# Patient Record
Sex: MALE | Race: WHITE | NOT HISPANIC OR LATINO | Employment: UNEMPLOYED | URBAN - METROPOLITAN AREA
[De-identification: names, ages, dates, MRNs, and addresses within clinical notes are randomized per-mention and may not be internally consistent; named-entity substitution may affect disease eponyms.]

---

## 2018-12-20 ENCOUNTER — HOSPITAL ENCOUNTER (INPATIENT)
Facility: MEDICAL CENTER | Age: 58
LOS: 11 days | DRG: 286 | End: 2018-12-31
Attending: EMERGENCY MEDICINE | Admitting: HOSPITALIST
Payer: MEDICAID

## 2018-12-20 ENCOUNTER — APPOINTMENT (OUTPATIENT)
Dept: RADIOLOGY | Facility: MEDICAL CENTER | Age: 58
DRG: 286 | End: 2018-12-20
Attending: EMERGENCY MEDICINE
Payer: MEDICAID

## 2018-12-20 DIAGNOSIS — I50.9 ACUTE CONGESTIVE HEART FAILURE, UNSPECIFIED HEART FAILURE TYPE (HCC): ICD-10-CM

## 2018-12-20 LAB
ALBUMIN SERPL BCP-MCNC: 3.6 G/DL (ref 3.2–4.9)
ALBUMIN/GLOB SERPL: 1.2 G/DL
ALP SERPL-CCNC: 81 U/L (ref 30–99)
ALT SERPL-CCNC: 21 U/L (ref 2–50)
ANION GAP SERPL CALC-SCNC: 6 MMOL/L (ref 0–11.9)
AST SERPL-CCNC: 26 U/L (ref 12–45)
BASOPHILS # BLD AUTO: 0.2 % (ref 0–1.8)
BASOPHILS # BLD: 0.03 K/UL (ref 0–0.12)
BILIRUB SERPL-MCNC: 0.8 MG/DL (ref 0.1–1.5)
BNP SERPL-MCNC: 1146 PG/ML (ref 0–100)
BUN SERPL-MCNC: 22 MG/DL (ref 8–22)
CALCIUM SERPL-MCNC: 9 MG/DL (ref 8.5–10.5)
CHLORIDE SERPL-SCNC: 103 MMOL/L (ref 96–112)
CO2 SERPL-SCNC: 30 MMOL/L (ref 20–33)
CREAT SERPL-MCNC: 1.02 MG/DL (ref 0.5–1.4)
EKG IMPRESSION: NORMAL
EOSINOPHIL # BLD AUTO: 0.01 K/UL (ref 0–0.51)
EOSINOPHIL NFR BLD: 0.1 % (ref 0–6.9)
ERYTHROCYTE [DISTWIDTH] IN BLOOD BY AUTOMATED COUNT: 40.5 FL (ref 35.9–50)
EST. AVERAGE GLUCOSE BLD GHB EST-MCNC: 117 MG/DL
GLOBULIN SER CALC-MCNC: 3 G/DL (ref 1.9–3.5)
GLUCOSE SERPL-MCNC: 100 MG/DL (ref 65–99)
HBA1C MFR BLD: 5.7 % (ref 0–5.6)
HCT VFR BLD AUTO: 47 % (ref 42–52)
HGB BLD-MCNC: 16.4 G/DL (ref 14–18)
IMM GRANULOCYTES # BLD AUTO: 0.06 K/UL (ref 0–0.11)
IMM GRANULOCYTES NFR BLD AUTO: 0.4 % (ref 0–0.9)
LACTATE BLD-SCNC: 1.4 MMOL/L (ref 0.5–2)
LYMPHOCYTES # BLD AUTO: 1.23 K/UL (ref 1–4.8)
LYMPHOCYTES NFR BLD: 9 % (ref 22–41)
MAGNESIUM SERPL-MCNC: 1.9 MG/DL (ref 1.5–2.5)
MCH RBC QN AUTO: 30.7 PG (ref 27–33)
MCHC RBC AUTO-ENTMCNC: 34.9 G/DL (ref 33.7–35.3)
MCV RBC AUTO: 88 FL (ref 81.4–97.8)
MONOCYTES # BLD AUTO: 0.63 K/UL (ref 0–0.85)
MONOCYTES NFR BLD AUTO: 4.6 % (ref 0–13.4)
NEUTROPHILS # BLD AUTO: 11.69 K/UL (ref 1.82–7.42)
NEUTROPHILS NFR BLD: 85.7 % (ref 44–72)
NRBC # BLD AUTO: 0 K/UL
NRBC BLD-RTO: 0 /100 WBC
PLATELET # BLD AUTO: 330 K/UL (ref 164–446)
PMV BLD AUTO: 9.2 FL (ref 9–12.9)
POTASSIUM SERPL-SCNC: 4.1 MMOL/L (ref 3.6–5.5)
PROT SERPL-MCNC: 6.6 G/DL (ref 6–8.2)
RBC # BLD AUTO: 5.34 M/UL (ref 4.7–6.1)
SODIUM SERPL-SCNC: 139 MMOL/L (ref 135–145)
TROPONIN I SERPL-MCNC: 0.15 NG/ML (ref 0–0.04)
TSH SERPL DL<=0.005 MIU/L-ACNC: 1.36 UIU/ML (ref 0.38–5.33)
WBC # BLD AUTO: 13.7 K/UL (ref 4.8–10.8)

## 2018-12-20 PROCEDURE — 36415 COLL VENOUS BLD VENIPUNCTURE: CPT

## 2018-12-20 PROCEDURE — 304561 HCHG STAT O2

## 2018-12-20 PROCEDURE — 83036 HEMOGLOBIN GLYCOSYLATED A1C: CPT

## 2018-12-20 PROCEDURE — 96367 TX/PROPH/DG ADDL SEQ IV INF: CPT

## 2018-12-20 PROCEDURE — 700105 HCHG RX REV CODE 258: Performed by: EMERGENCY MEDICINE

## 2018-12-20 PROCEDURE — A9270 NON-COVERED ITEM OR SERVICE: HCPCS | Performed by: HOSPITALIST

## 2018-12-20 PROCEDURE — 80053 COMPREHEN METABOLIC PANEL: CPT

## 2018-12-20 PROCEDURE — 700102 HCHG RX REV CODE 250 W/ 637 OVERRIDE(OP): Performed by: EMERGENCY MEDICINE

## 2018-12-20 PROCEDURE — 87040 BLOOD CULTURE FOR BACTERIA: CPT

## 2018-12-20 PROCEDURE — 83735 ASSAY OF MAGNESIUM: CPT

## 2018-12-20 PROCEDURE — 84484 ASSAY OF TROPONIN QUANT: CPT

## 2018-12-20 PROCEDURE — 71045 X-RAY EXAM CHEST 1 VIEW: CPT

## 2018-12-20 PROCEDURE — 83605 ASSAY OF LACTIC ACID: CPT

## 2018-12-20 PROCEDURE — 99285 EMERGENCY DEPT VISIT HI MDM: CPT

## 2018-12-20 PROCEDURE — A9270 NON-COVERED ITEM OR SERVICE: HCPCS | Performed by: EMERGENCY MEDICINE

## 2018-12-20 PROCEDURE — 770020 HCHG ROOM/CARE - TELE (206)

## 2018-12-20 PROCEDURE — 96375 TX/PRO/DX INJ NEW DRUG ADDON: CPT

## 2018-12-20 PROCEDURE — 85025 COMPLETE CBC W/AUTO DIFF WBC: CPT

## 2018-12-20 PROCEDURE — 96372 THER/PROPH/DIAG INJ SC/IM: CPT

## 2018-12-20 PROCEDURE — 700111 HCHG RX REV CODE 636 W/ 250 OVERRIDE (IP): Performed by: EMERGENCY MEDICINE

## 2018-12-20 PROCEDURE — 96365 THER/PROPH/DIAG IV INF INIT: CPT

## 2018-12-20 PROCEDURE — 96376 TX/PRO/DX INJ SAME DRUG ADON: CPT

## 2018-12-20 PROCEDURE — 700102 HCHG RX REV CODE 250 W/ 637 OVERRIDE(OP): Performed by: HOSPITALIST

## 2018-12-20 PROCEDURE — 93005 ELECTROCARDIOGRAM TRACING: CPT | Performed by: EMERGENCY MEDICINE

## 2018-12-20 PROCEDURE — 83880 ASSAY OF NATRIURETIC PEPTIDE: CPT

## 2018-12-20 PROCEDURE — 84443 ASSAY THYROID STIM HORMONE: CPT

## 2018-12-20 PROCEDURE — 700111 HCHG RX REV CODE 636 W/ 250 OVERRIDE (IP): Performed by: HOSPITALIST

## 2018-12-20 RX ORDER — IBUPROFEN 200 MG
1000 TABLET ORAL EVERY 8 HOURS PRN
Status: ON HOLD | COMMUNITY
End: 2018-12-31

## 2018-12-20 RX ORDER — FUROSEMIDE 10 MG/ML
20 INJECTION INTRAMUSCULAR; INTRAVENOUS
Status: DISCONTINUED | OUTPATIENT
Start: 2018-12-20 | End: 2018-12-21

## 2018-12-20 RX ORDER — ACETAMINOPHEN 325 MG/1
650 TABLET ORAL EVERY 6 HOURS PRN
Status: DISCONTINUED | OUTPATIENT
Start: 2018-12-20 | End: 2018-12-31 | Stop reason: HOSPADM

## 2018-12-20 RX ORDER — FUROSEMIDE 10 MG/ML
40 INJECTION INTRAMUSCULAR; INTRAVENOUS ONCE
Status: COMPLETED | OUTPATIENT
Start: 2018-12-20 | End: 2018-12-20

## 2018-12-20 RX ORDER — PROMETHAZINE HYDROCHLORIDE 25 MG/1
12.5-25 SUPPOSITORY RECTAL EVERY 4 HOURS PRN
Status: DISCONTINUED | OUTPATIENT
Start: 2018-12-20 | End: 2018-12-31 | Stop reason: HOSPADM

## 2018-12-20 RX ORDER — NITROGLYCERIN 0.4 MG/1
0.4 TABLET SUBLINGUAL ONCE
Status: COMPLETED | OUTPATIENT
Start: 2018-12-20 | End: 2018-12-20

## 2018-12-20 RX ORDER — AZITHROMYCIN 500 MG/1
500 INJECTION, POWDER, LYOPHILIZED, FOR SOLUTION INTRAVENOUS ONCE
Status: COMPLETED | OUTPATIENT
Start: 2018-12-20 | End: 2018-12-20

## 2018-12-20 RX ORDER — LISINOPRIL 5 MG/1
10 TABLET ORAL DAILY
Status: DISCONTINUED | OUTPATIENT
Start: 2018-12-20 | End: 2018-12-21

## 2018-12-20 RX ORDER — BISACODYL 10 MG
10 SUPPOSITORY, RECTAL RECTAL
Status: DISCONTINUED | OUTPATIENT
Start: 2018-12-20 | End: 2018-12-31 | Stop reason: HOSPADM

## 2018-12-20 RX ORDER — ONDANSETRON 2 MG/ML
4 INJECTION INTRAMUSCULAR; INTRAVENOUS EVERY 4 HOURS PRN
Status: DISCONTINUED | OUTPATIENT
Start: 2018-12-20 | End: 2018-12-31 | Stop reason: HOSPADM

## 2018-12-20 RX ORDER — PROMETHAZINE HYDROCHLORIDE 25 MG/1
12.5-25 TABLET ORAL EVERY 4 HOURS PRN
Status: DISCONTINUED | OUTPATIENT
Start: 2018-12-20 | End: 2018-12-31 | Stop reason: HOSPADM

## 2018-12-20 RX ORDER — AMOXICILLIN 250 MG
2 CAPSULE ORAL 2 TIMES DAILY
Status: DISCONTINUED | OUTPATIENT
Start: 2018-12-20 | End: 2018-12-31 | Stop reason: HOSPADM

## 2018-12-20 RX ORDER — POLYETHYLENE GLYCOL 3350 17 G/17G
1 POWDER, FOR SOLUTION ORAL
Status: DISCONTINUED | OUTPATIENT
Start: 2018-12-20 | End: 2018-12-31 | Stop reason: HOSPADM

## 2018-12-20 RX ORDER — ONDANSETRON 4 MG/1
4 TABLET, ORALLY DISINTEGRATING ORAL EVERY 4 HOURS PRN
Status: DISCONTINUED | OUTPATIENT
Start: 2018-12-20 | End: 2018-12-31 | Stop reason: HOSPADM

## 2018-12-20 RX ADMIN — AZITHROMYCIN MONOHYDRATE 500 MG: 500 INJECTION, POWDER, LYOPHILIZED, FOR SOLUTION INTRAVENOUS at 12:05

## 2018-12-20 RX ADMIN — NITROGLYCERIN 0.4 MG: 0.4 TABLET SUBLINGUAL at 11:59

## 2018-12-20 RX ADMIN — ENOXAPARIN SODIUM 40 MG: 100 INJECTION SUBCUTANEOUS at 16:57

## 2018-12-20 RX ADMIN — LISINOPRIL 10 MG: 10 TABLET ORAL at 16:58

## 2018-12-20 RX ADMIN — NITROGLYCERIN 1 INCH: 20 OINTMENT TOPICAL at 12:00

## 2018-12-20 RX ADMIN — FUROSEMIDE 20 MG: 10 INJECTION, SOLUTION INTRAMUSCULAR; INTRAVENOUS at 16:57

## 2018-12-20 RX ADMIN — SODIUM CHLORIDE 3 G: 900 INJECTION INTRAVENOUS at 11:15

## 2018-12-20 RX ADMIN — FUROSEMIDE 40 MG: 10 INJECTION, SOLUTION INTRAMUSCULAR; INTRAVENOUS at 12:14

## 2018-12-20 ASSESSMENT — COGNITIVE AND FUNCTIONAL STATUS - GENERAL
SUGGESTED CMS G CODE MODIFIER MOBILITY: CJ
DRESSING REGULAR LOWER BODY CLOTHING: A LITTLE
CLIMB 3 TO 5 STEPS WITH RAILING: A LITTLE
WALKING IN HOSPITAL ROOM: A LITTLE
STANDING UP FROM CHAIR USING ARMS: A LITTLE
DAILY ACTIVITIY SCORE: 23
MOBILITY SCORE: 21
SUGGESTED CMS G CODE MODIFIER DAILY ACTIVITY: CI

## 2018-12-20 ASSESSMENT — COPD QUESTIONNAIRES
DURING THE PAST 4 WEEKS HOW MUCH DID YOU FEEL SHORT OF BREATH: SOME OF THE TIME
HAVE YOU SMOKED AT LEAST 100 CIGARETTES IN YOUR ENTIRE LIFE: YES
COPD SCREENING SCORE: 5
HAVE YOU SMOKED AT LEAST 100 CIGARETTES IN YOUR ENTIRE LIFE: YES
DO YOU EVER COUGH UP ANY MUCUS OR PHLEGM?: YES, A FEW DAYS A WEEK OR MONTH
COPD SCREENING SCORE: 6
DURING THE PAST 4 WEEKS HOW MUCH DID YOU FEEL SHORT OF BREATH: SOME OF THE TIME
DO YOU EVER COUGH UP ANY MUCUS OR PHLEGM?: YES, A FEW DAYS A WEEK OR MONTH
IN THE PAST 12 MONTHS DO YOU DO LESS THAN YOU USED TO BECAUSE OF YOUR BREATHING PROBLEMS: DISAGREE/UNSURE

## 2018-12-20 ASSESSMENT — LIFESTYLE VARIABLES
EVER_SMOKED: YES
DO YOU DRINK ALCOHOL: NO
ALCOHOL_USE: NO
EVER_SMOKED: YES

## 2018-12-20 ASSESSMENT — PATIENT HEALTH QUESTIONNAIRE - PHQ9
1. LITTLE INTEREST OR PLEASURE IN DOING THINGS: NOT AT ALL
2. FEELING DOWN, DEPRESSED, IRRITABLE, OR HOPELESS: NOT AT ALL
SUM OF ALL RESPONSES TO PHQ9 QUESTIONS 1 AND 2: 0

## 2018-12-20 ASSESSMENT — PAIN SCALES - GENERAL
PAINLEVEL_OUTOF10: 0
PAINLEVEL_OUTOF10: 0

## 2018-12-20 NOTE — ED TRIAGE NOTES
Chief Complaint   Patient presents with   • Shortness of Breath     x 1 week progressively getting worse. arrived in triage hypoxic on ra w/sat of 60%. placed on 6l/nc still hypoxic 88%. pt able to speak in full sentences.   • Cough     productive cough x 1 week. states coughing up greenish sputum     Charge nurse notified.

## 2018-12-20 NOTE — ED PROVIDER NOTES
"ED Provider Note    CHIEF COMPLAINT  Chief Complaint   Patient presents with   • Shortness of Breath     x 1 week progressively getting worse. arrived in triage hypoxic on ra w/sat of 60%. placed on 6l/nc still hypoxic 88%. pt able to speak in full sentences.   • Cough     productive cough x 1 week. states coughing up greenish sputum       HPI  Alexis Georges is a 58 y.o. male who presents shortness of breath.  Patient started getting short of breath about 2 weeks ago.  Had congestion and runny nose.  Developed cough about a week ago.  Coughing up green mucus.  Has noticed leg swelling on both legs over the last week as well.  He has not had a fever or chills.  He states that his shortness of breath is constant, but worse with any activity.  He denies orthopnea or PND.  Denies pleuritic chest pain.    REVIEW OF SYSTEMS  As per HPI, otherwise a 10 point review of systems is negative    PAST MEDICAL HISTORY  Denies medical problems.  Denies history of COPD, emphysema, congestive heart failure, coronary disease etc.    SOCIAL HISTORY  Social History   Substance Use Topics   • Smoking status: Current Some Day Smoker     Packs/day: 0.10   • Smokeless tobacco: Never Used   • Alcohol use Not on file   Denies illicit drug use    SURGICAL HISTORY  History reviewed. No pertinent surgical history.    CURRENT MEDICATIONS  Home Medications    **Home medications have not yet been reviewed for this encounter**         ALLERGIES  No Known Allergies    PHYSICAL EXAM  VITAL SIGNS: /106   Pulse 61   Temp 36.9 °C (98.4 °F) (Temporal)   Resp (!) 40   Ht 1.93 m (6' 4\")   Wt 86.2 kg (190 lb)   SpO2 100%   BMI 23.13 kg/m²    Constitutional: Awake and alert, dyspneic, ill-appearing  HENT:  Atraumatic, Normocephalic.Oropharynx dry mucus membranes, Nose normal inspection.   Eyes: Normal inspection  Neck: Supple, positive JVD  Cardiovascular: Normal heart rate, Normal rhythm.  Symmetric peripheral pulses.   Thorax & Lungs: " Decreased breath sounds on the right particularly right base versus the left.  Good aeration otherwise.  Abdomen: Bowel sounds normal, soft, non-distended, nontender, no mass  Skin: Warm, Dry, No rash.   Extremities: Pedal edema bilaterally.  Neurologic: Grossly normal   Psychiatric: Anxious appearing    RADIOLOGY/PROCEDURES  DX-CHEST-PORTABLE (1 VIEW)   Final Result         1. Cardiomegaly and bibasilar interstitial/alveolar opacities, right worse than left. They likely represent consolidations and/or edema. Multifocal pneumonia can be considered in the appropriate clinical settings.   2. Small right pleural effusion.      EC-ECHOCARDIOGRAM COMPLETE W/O CONT    (Results Pending)        Imaging is interpreted by radiologist    Labs:  Results for orders placed or performed during the hospital encounter of 12/20/18   LACTIC ACID   Result Value Ref Range    Lactic Acid 1.4 0.5 - 2.0 mmol/L   CBC WITH DIFFERENTIAL   Result Value Ref Range    WBC 13.7 (H) 4.8 - 10.8 K/uL    RBC 5.34 4.70 - 6.10 M/uL    Hemoglobin 16.4 14.0 - 18.0 g/dL    Hematocrit 47.0 42.0 - 52.0 %    MCV 88.0 81.4 - 97.8 fL    MCH 30.7 27.0 - 33.0 pg    MCHC 34.9 33.7 - 35.3 g/dL    RDW 40.5 35.9 - 50.0 fL    Platelet Count 330 164 - 446 K/uL    MPV 9.2 9.0 - 12.9 fL    Neutrophils-Polys 85.70 (H) 44.00 - 72.00 %    Lymphocytes 9.00 (L) 22.00 - 41.00 %    Monocytes 4.60 0.00 - 13.40 %    Eosinophils 0.10 0.00 - 6.90 %    Basophils 0.20 0.00 - 1.80 %    Immature Granulocytes 0.40 0.00 - 0.90 %    Nucleated RBC 0.00 /100 WBC    Neutrophils (Absolute) 11.69 (H) 1.82 - 7.42 K/uL    Lymphs (Absolute) 1.23 1.00 - 4.80 K/uL    Monos (Absolute) 0.63 0.00 - 0.85 K/uL    Eos (Absolute) 0.01 0.00 - 0.51 K/uL    Baso (Absolute) 0.03 0.00 - 0.12 K/uL    Immature Granulocytes (abs) 0.06 0.00 - 0.11 K/uL    NRBC (Absolute) 0.00 K/uL   COMP METABOLIC PANEL   Result Value Ref Range    Sodium 139 135 - 145 mmol/L    Potassium 4.1 3.6 - 5.5 mmol/L    Chloride 103 96 -  112 mmol/L    Co2 30 20 - 33 mmol/L    Anion Gap 6.0 0.0 - 11.9    Glucose 100 (H) 65 - 99 mg/dL    Bun 22 8 - 22 mg/dL    Creatinine 1.02 0.50 - 1.40 mg/dL    Calcium 9.0 8.5 - 10.5 mg/dL    AST(SGOT) 26 12 - 45 U/L    ALT(SGPT) 21 2 - 50 U/L    Alkaline Phosphatase 81 30 - 99 U/L    Total Bilirubin 0.8 0.1 - 1.5 mg/dL    Albumin 3.6 3.2 - 4.9 g/dL    Total Protein 6.6 6.0 - 8.2 g/dL    Globulin 3.0 1.9 - 3.5 g/dL    A-G Ratio 1.2 g/dL   BLOOD CULTURE   Result Value Ref Range    Significant Indicator NEG     Source BLD     Site PERIPHERAL     Blood Culture       No Growth    Note: Blood cultures are incubated for 5 days and  are monitored continuously.Positive blood cultures  are called to the RN and reported as soon as  they are identified.     BLOOD CULTURE   Result Value Ref Range    Significant Indicator NEG     Source BLD     Site PERIPHERAL     Blood Culture       No Growth    Note: Blood cultures are incubated for 5 days and  are monitored continuously.Positive blood cultures  are called to the RN and reported as soon as  they are identified.     TROPONIN   Result Value Ref Range    Troponin I 0.15 (H) 0.00 - 0.04 ng/mL   BTYPE NATRIURETIC PEPTIDE   Result Value Ref Range    B Natriuretic Peptide 1146 (H) 0 - 100 pg/mL   ESTIMATED GFR   Result Value Ref Range    GFR If African American >60 >60 mL/min/1.73 m 2    GFR If Non African American >60 >60 mL/min/1.73 m 2   TSH (Thyroid Stimulating Hormone)   Result Value Ref Range    TSH 1.360 0.380 - 5.330 uIU/mL   Hemoglobin A1c   Result Value Ref Range    Glycohemoglobin 5.7 (H) 0.0 - 5.6 %    Est Avg Glucose 117 mg/dL   Magnesium   Result Value Ref Range    Magnesium 1.9 1.5 - 2.5 mg/dL   Basic Metabolic Panel (BMP)   Result Value Ref Range    Sodium 139 135 - 145 mmol/L    Potassium 4.9 3.6 - 5.5 mmol/L    Chloride 104 96 - 112 mmol/L    Co2 29 20 - 33 mmol/L    Glucose 118 (H) 65 - 99 mg/dL    Bun 29 (H) 8 - 22 mg/dL    Creatinine 1.06 0.50 - 1.40 mg/dL     Calcium 8.3 (L) 8.5 - 10.5 mg/dL    Anion Gap 6.0 0.0 - 11.9   Lipid Profile (Lipid Panel) Fasting   Result Value Ref Range    Cholesterol,Tot 109 100 - 199 mg/dL    Triglycerides 46 0 - 149 mg/dL    HDL 27 (A) >=40 mg/dL    LDL 73 <100 mg/dL   Magnesium: Tomorrow AM   Result Value Ref Range    Magnesium 1.8 1.5 - 2.5 mg/dL   ABG - LAB   Result Value Ref Range    Ph 7.29 (LL) 7.40 - 7.50    Pco2 63.2 (HH) 26.0 - 37.0 mmHg    Po2 77.5 64.0 - 87.0 mmHg    O2 Saturation 93.1 93.0 - 99.0 %    Hco3 30 (H) 17 - 25 mmol/L    Base Excess 1 -4 - 3 mmol/L    Body Temp see below Centigrade   ESTIMATED GFR   Result Value Ref Range    GFR If African American >60 >60 mL/min/1.73 m 2    GFR If Non African American >60 >60 mL/min/1.73 m 2   ABG - LAB   Result Value Ref Range    Ph 7.28 (LL) 7.40 - 7.50    Pco2 68.3 (HH) 26.0 - 37.0 mmHg    Po2 70.7 64.0 - 87.0 mmHg    O2 Saturation 91.6 (L) 93.0 - 99.0 %    Hco3 31 (H) 17 - 25 mmol/L    Base Excess 2 -4 - 3 mmol/L    Body Temp see below Centigrade    O2 Therapy 5.0 2.0 - 10.0 L/min   ABG - LAB   Result Value Ref Range    Ph 7.34 (L) 7.40 - 7.50    Pco2 59.1 (HH) 26.0 - 37.0 mmHg    Po2 69.0 64.0 - 87.0 mmHg    O2 Saturation 92.8 (L) 93.0 - 99.0 %    Hco3 31 (H) 17 - 25 mmol/L    Base Excess 4 (H) -4 - 3 mmol/L    Body Temp 36.7 Centigrade    O2 Therapy 5.0 2.0 - 10.0 L/min    Ph -TC 7.35 (L) 7.40 - 7.50    Pco2 -TC 58.3 (HH) 26.0 - 37.0 mmHg    Po2 -TC 67.6 64.0 - 87.0 mmHg   EKG (NOW)   Result Value Ref Range    Report       [facility] Emergency Dept.    Test Date:  2018  Pt Name:    DINA CROUCH              Department: ER  MRN:        [id number]                      Room:        09  Gender:     Male                         Technician: [id number]  :        1960                   Requested By:ER TRIAGE PROTOCOL  Order #:    [id number]                    Reading MD: SANTANA ALBERT MD    Measurements  Intervals                                 Axis  Rate:       108                          P:          43  CA:         148                          QRS:        117  QRSD:       82                           T:          -52  QT:         340  QTc:        456    Interpretive Statements  SINUS TACHYCARDIA  PROBABLE LEFT ATRIAL ABNORMALITY  LEFT POSTERIOR FASCICULAR BLOCK  Nonspecific ST-T wave changes    No previous ECG available for comparison    Electronically Signed On 12- 11:28:48 PST by SANTANA ALBERT MD           COURSE & MEDICAL DECISION MAKING  Patient presents with cough and shortness of breath.  He appears volume overloaded on his examination although described production with his cough.  He met some septic parameters and was given empiric antibiotic on arrival.  Chest x-ray demonstrates infiltrate more on the right than the left.  However data returned more consistent with congestive heart failure and volume overload.  He was given aspirin and 40 mg's of Lasix.  He will need to be admitted to the hospital for further workup and treatment.  I consulted Dr. López for admission.      FINAL IMPRESSION  1.  Acute congestive heart failure with volume overload  2.  Rule out pneumonia/superimposed infection    This dictation was created using voice recognition software. The accuracy of the dictation is limited to the abilities of the software.  The nursing notes were reviewed and certain aspects of this information were incorporated into this note.      Electronically signed by: Santana Albert, 12/20/2018 10:47 AM

## 2018-12-20 NOTE — ED TRIAGE NOTES
".  Chief Complaint   Patient presents with   • Shortness of Breath     x 1 week progressively getting worse. arrived in triage hypoxic on ra w/sat of 60%. placed on 6l/nc still hypoxic 88%. pt able to speak in full sentences.   • Cough     productive cough x 1 week. states coughing up greenish sputum   Agree with triage assessment.  Pt reports gradual onset approximately 3 weeks ago, difficulty breathing 1 week ago.  No chest pain.  No fever/chills.  Pt states \" feels like the pneumonia I had before. \"  + BLE edema noted(onset 3 weeks ago).     "

## 2018-12-21 ENCOUNTER — APPOINTMENT (OUTPATIENT)
Dept: CARDIOLOGY | Facility: MEDICAL CENTER | Age: 58
DRG: 286 | End: 2018-12-21
Attending: HOSPITALIST
Payer: MEDICAID

## 2018-12-21 ENCOUNTER — APPOINTMENT (OUTPATIENT)
Dept: RADIOLOGY | Facility: MEDICAL CENTER | Age: 58
DRG: 286 | End: 2018-12-21
Attending: INTERNAL MEDICINE
Payer: MEDICAID

## 2018-12-21 PROBLEM — I50.9 CHF (CONGESTIVE HEART FAILURE) (HCC): Status: ACTIVE | Noted: 2018-12-21

## 2018-12-21 PROBLEM — R06.89 HYPERCAPNIA: Status: ACTIVE | Noted: 2018-12-21

## 2018-12-21 LAB
AMPHET UR QL SCN: NEGATIVE
ANION GAP SERPL CALC-SCNC: 6 MMOL/L (ref 0–11.9)
BARBITURATES UR QL SCN: NEGATIVE
BASE EXCESS BLDA CALC-SCNC: 1 MMOL/L (ref -4–3)
BASE EXCESS BLDA CALC-SCNC: 2 MMOL/L (ref -4–3)
BASE EXCESS BLDA CALC-SCNC: 2 MMOL/L (ref -4–3)
BASE EXCESS BLDA CALC-SCNC: 4 MMOL/L (ref -4–3)
BENZODIAZ UR QL SCN: NEGATIVE
BODY TEMPERATURE: 36.7 CENTIGRADE
BODY TEMPERATURE: ABNORMAL CENTIGRADE
BUN SERPL-MCNC: 29 MG/DL (ref 8–22)
BZE UR QL SCN: NEGATIVE
CALCIUM SERPL-MCNC: 8.3 MG/DL (ref 8.5–10.5)
CANNABINOIDS UR QL SCN: NEGATIVE
CHLORIDE SERPL-SCNC: 104 MMOL/L (ref 96–112)
CHOLEST SERPL-MCNC: 109 MG/DL (ref 100–199)
CK SERPL-CCNC: 52 U/L (ref 0–154)
CO2 SERPL-SCNC: 29 MMOL/L (ref 20–33)
CREAT SERPL-MCNC: 1.06 MG/DL (ref 0.5–1.4)
GLUCOSE SERPL-MCNC: 118 MG/DL (ref 65–99)
HCO3 BLDA-SCNC: 30 MMOL/L (ref 17–25)
HCO3 BLDA-SCNC: 30 MMOL/L (ref 17–25)
HCO3 BLDA-SCNC: 31 MMOL/L (ref 17–25)
HCO3 BLDA-SCNC: 31 MMOL/L (ref 17–25)
HDLC SERPL-MCNC: 27 MG/DL
HEMOCCULT STL QL: POSITIVE
HIV 1+2 AB+HIV1 P24 AG SERPL QL IA: NON REACTIVE
INHALED O2 FLOW RATE: 5 L/MIN (ref 2–10)
LACTATE BLD-SCNC: 2 MMOL/L (ref 0.5–2)
LDLC SERPL CALC-MCNC: 73 MG/DL
LV EJECT FRACT  99904: 75
LV EJECT FRACT MOD 2C 99903: 58.44
LV EJECT FRACT MOD 4C 99902: 84.31
LV EJECT FRACT MOD BP 99901: 75.35
MAGNESIUM SERPL-MCNC: 1.8 MG/DL (ref 1.5–2.5)
METHADONE UR QL SCN: NEGATIVE
OPIATES UR QL SCN: NEGATIVE
OXYCODONE UR QL SCN: NEGATIVE
PCO2 BLDA: 59.1 MMHG (ref 26–37)
PCO2 BLDA: 60.3 MMHG (ref 26–37)
PCO2 BLDA: 63.2 MMHG (ref 26–37)
PCO2 BLDA: 68.3 MMHG (ref 26–37)
PCO2 TEMP ADJ BLDA: 58.3 MMHG (ref 26–37)
PCP UR QL SCN: NEGATIVE
PH BLDA: 7.28 [PH] (ref 7.4–7.5)
PH BLDA: 7.29 [PH] (ref 7.4–7.5)
PH BLDA: 7.31 [PH] (ref 7.4–7.5)
PH BLDA: 7.34 [PH] (ref 7.4–7.5)
PH TEMP ADJ BLDA: 7.35 [PH] (ref 7.4–7.5)
PO2 BLDA: 68.4 MMHG (ref 64–87)
PO2 BLDA: 69 MMHG (ref 64–87)
PO2 BLDA: 70.7 MMHG (ref 64–87)
PO2 BLDA: 77.5 MMHG (ref 64–87)
PO2 TEMP ADJ BLDA: 67.6 MMHG (ref 64–87)
POTASSIUM SERPL-SCNC: 4.9 MMOL/L (ref 3.6–5.5)
PROPOXYPH UR QL SCN: NEGATIVE
RHEUMATOID FACT SER IA-ACNC: <10 IU/ML (ref 0–14)
SAO2 % BLDA: 91.6 % (ref 93–99)
SAO2 % BLDA: 92.1 % (ref 93–99)
SAO2 % BLDA: 92.8 % (ref 93–99)
SAO2 % BLDA: 93.1 % (ref 93–99)
SODIUM SERPL-SCNC: 139 MMOL/L (ref 135–145)
TRIGL SERPL-MCNC: 46 MG/DL (ref 0–149)
TROPONIN I SERPL-MCNC: 0.09 NG/ML (ref 0–0.04)

## 2018-12-21 PROCEDURE — 86200 CCP ANTIBODY: CPT

## 2018-12-21 PROCEDURE — 82803 BLOOD GASES ANY COMBINATION: CPT | Mod: 91

## 2018-12-21 PROCEDURE — 94002 VENT MGMT INPAT INIT DAY: CPT

## 2018-12-21 PROCEDURE — 86038 ANTINUCLEAR ANTIBODIES: CPT

## 2018-12-21 PROCEDURE — 84156 ASSAY OF PROTEIN URINE: CPT

## 2018-12-21 PROCEDURE — 82550 ASSAY OF CK (CPK): CPT

## 2018-12-21 PROCEDURE — 99232 SBSQ HOSP IP/OBS MODERATE 35: CPT | Performed by: FAMILY MEDICINE

## 2018-12-21 PROCEDURE — 84484 ASSAY OF TROPONIN QUANT: CPT

## 2018-12-21 PROCEDURE — 93306 TTE W/DOPPLER COMPLETE: CPT

## 2018-12-21 PROCEDURE — 82272 OCCULT BLD FECES 1-3 TESTS: CPT

## 2018-12-21 PROCEDURE — 700117 HCHG RX CONTRAST REV CODE 255: Performed by: INTERNAL MEDICINE

## 2018-12-21 PROCEDURE — 94668 MNPJ CHEST WALL SBSQ: CPT

## 2018-12-21 PROCEDURE — 83605 ASSAY OF LACTIC ACID: CPT

## 2018-12-21 PROCEDURE — 94669 MECHANICAL CHEST WALL OSCILL: CPT

## 2018-12-21 PROCEDURE — 80061 LIPID PANEL: CPT

## 2018-12-21 PROCEDURE — 80307 DRUG TEST PRSMV CHEM ANLYZR: CPT

## 2018-12-21 PROCEDURE — 99291 CRITICAL CARE FIRST HOUR: CPT | Performed by: INTERNAL MEDICINE

## 2018-12-21 PROCEDURE — 770022 HCHG ROOM/CARE - ICU (200)

## 2018-12-21 PROCEDURE — 87389 HIV-1 AG W/HIV-1&-2 AB AG IA: CPT

## 2018-12-21 PROCEDURE — 94667 MNPJ CHEST WALL 1ST: CPT

## 2018-12-21 PROCEDURE — 99255 IP/OBS CONSLTJ NEW/EST HI 80: CPT | Performed by: INTERNAL MEDICINE

## 2018-12-21 PROCEDURE — 700111 HCHG RX REV CODE 636 W/ 250 OVERRIDE (IP): Performed by: HOSPITALIST

## 2018-12-21 PROCEDURE — 82085 ASSAY OF ALDOLASE: CPT

## 2018-12-21 PROCEDURE — 86331 IMMUNODIFFUSION OUCHTERLONY: CPT

## 2018-12-21 PROCEDURE — 83735 ASSAY OF MAGNESIUM: CPT

## 2018-12-21 PROCEDURE — 93306 TTE W/DOPPLER COMPLETE: CPT | Mod: 26 | Performed by: INTERNAL MEDICINE

## 2018-12-21 PROCEDURE — 86431 RHEUMATOID FACTOR QUANT: CPT

## 2018-12-21 PROCEDURE — 81003 URINALYSIS AUTO W/O SCOPE: CPT

## 2018-12-21 PROCEDURE — 86255 FLUORESCENT ANTIBODY SCREEN: CPT

## 2018-12-21 PROCEDURE — 80048 BASIC METABOLIC PNL TOTAL CA: CPT

## 2018-12-21 PROCEDURE — 86606 ASPERGILLUS ANTIBODY: CPT | Mod: 91

## 2018-12-21 PROCEDURE — 71275 CT ANGIOGRAPHY CHEST: CPT

## 2018-12-21 PROCEDURE — 83883 ASSAY NEPHELOMETRY NOT SPEC: CPT | Mod: 91

## 2018-12-21 RX ORDER — LISINOPRIL 5 MG/1
2.5 TABLET ORAL DAILY
Status: DISCONTINUED | OUTPATIENT
Start: 2018-12-22 | End: 2018-12-21

## 2018-12-21 RX ORDER — FUROSEMIDE 10 MG/ML
40 INJECTION INTRAMUSCULAR; INTRAVENOUS
Status: DISCONTINUED | OUTPATIENT
Start: 2018-12-21 | End: 2018-12-22

## 2018-12-21 RX ORDER — METOPROLOL SUCCINATE 25 MG/1
12.5 TABLET, EXTENDED RELEASE ORAL
Status: DISCONTINUED | OUTPATIENT
Start: 2018-12-21 | End: 2018-12-21

## 2018-12-21 RX ORDER — IPRATROPIUM BROMIDE AND ALBUTEROL SULFATE 2.5; .5 MG/3ML; MG/3ML
3 SOLUTION RESPIRATORY (INHALATION)
Status: DISCONTINUED | OUTPATIENT
Start: 2018-12-21 | End: 2018-12-31 | Stop reason: HOSPADM

## 2018-12-21 RX ADMIN — ENOXAPARIN SODIUM 40 MG: 100 INJECTION SUBCUTANEOUS at 03:43

## 2018-12-21 RX ADMIN — IOHEXOL 80 ML: 350 INJECTION, SOLUTION INTRAVENOUS at 15:25

## 2018-12-21 RX ADMIN — FUROSEMIDE 20 MG: 10 INJECTION, SOLUTION INTRAMUSCULAR; INTRAVENOUS at 03:43

## 2018-12-21 ASSESSMENT — PULMONARY FUNCTION TESTS
EPAP_CMH2O: 5
EPAP_CMH2O: 7
EPAP_CMH2O: 7
EPAP_CMH2O: 5

## 2018-12-21 ASSESSMENT — ENCOUNTER SYMPTOMS
HEMOPTYSIS: 0
HEARTBURN: 0
BLURRED VISION: 0
CHILLS: 0
VOMITING: 0
BACK PAIN: 0
PHOTOPHOBIA: 0
FEVER: 0
DIZZINESS: 0
COUGH: 0
NAUSEA: 0
NECK PAIN: 0
TINGLING: 0
DOUBLE VISION: 0
MYALGIAS: 0
SHORTNESS OF BREATH: 1
HEADACHES: 0
PALPITATIONS: 0

## 2018-12-21 ASSESSMENT — PAIN SCALES - GENERAL
PAINLEVEL_OUTOF10: 0

## 2018-12-21 NOTE — PROGRESS NOTES
[facility] Medicine Daily Progress Note    Date of Service  12/21/2018    Chief Complaint  58 y.o. male admitted 12/20/2018 with chf    Hospital Course    none      Interval Problem Update  none    Consultants/Specialty  none    Code Status  full    Disposition  pending    Review of Systems  Review of Systems   Constitutional: Negative for chills and fever.   HENT: Negative for ear discharge, ear pain and tinnitus.    Eyes: Negative for blurred vision, double vision and photophobia.   Respiratory: Positive for shortness of breath. Negative for cough and hemoptysis.    Cardiovascular: Positive for leg swelling. Negative for chest pain and palpitations.   Gastrointestinal: Negative for heartburn, nausea and vomiting.   Genitourinary: Negative for dysuria, frequency and urgency.   Musculoskeletal: Negative for back pain, myalgias and neck pain.   Skin: Negative for itching and rash.   Neurological: Negative for dizziness, tingling and headaches.        Physical Exam  Temp:  [36.6 °C (97.8 °F)-36.9 °C (98.4 °F)] 36.7 °C (98.1 °F)  Pulse:  [] 100  Resp:  [17-37] 22  BP: ()/() 92/66    Physical Exam   Constitutional: He is oriented to person, place, and time. He appears well-developed and well-nourished.   HENT:   Head: Normocephalic and atraumatic.   Eyes: Pupils are equal, round, and reactive to light. Conjunctivae are normal.   Neck: Normal range of motion. Neck supple.   Cardiovascular: Normal rate and regular rhythm.    Pulmonary/Chest:   Decreased breath sounds bilaterally   Abdominal: Soft. Bowel sounds are normal.   Musculoskeletal: He exhibits edema (2+ pitting bilaterally).   Neurological: He is alert and oriented to person, place, and time.   Skin: Skin is warm and dry.       Fluids    Intake/Output Summary (Last 24 hours) at 12/21/18 0943  Last data filed at 12/21/18 0800   Gross per 24 hour   Intake              860 ml   Output              200 ml   Net              660 ml        Laboratory  Recent Labs      12/20/18   1045   WBC  13.7*   RBC  5.34   HEMOGLOBIN  16.4   HEMATOCRIT  47.0   MCV  88.0   MCH  30.7   MCHC  34.9   RDW  40.5   PLATELETCT  330   MPV  9.2     Recent Labs      12/20/18   1045  12/21/18   0405   SODIUM  139  139   POTASSIUM  4.1  4.9   CHLORIDE  103  104   CO2  30  29   GLUCOSE  100*  118*   BUN  22  29*   CREATININE  1.02  1.06   CALCIUM  9.0  8.3*         Recent Labs      12/20/18   1045   BNPBTYPENAT  1146*     Recent Labs      12/21/18   0405   TRIGLYCERIDE  46   HDL  27*   LDL  73       Imaging      Assessment/Plan  Hypercapnia   Assessment & Plan    His abg result is noted  Improvement in pco2 is noted  He is mentally intact  Will check ABG this afternoon     CHF (congestive heart failure) (HCC)   Assessment & Plan    Acute  With acute resp failure  Start the pt on low dose toprol xl  Decreased lisiniopril 2nd to low bp  Cardiac echo is pending  Elevated BNP is noted          VTE prophylaxis: lovenox

## 2018-12-21 NOTE — ASSESSMENT & PLAN NOTE
Cardiology consulting.  Diuresis, strict I/O's, monitor labs  IMproving peripheral edema.  12/24 Total output since admit: -2.1L  Checking CXR as has ongoing decreased breath sounds right lung on exam  12/21 Echo: Left ventricle is small in size. Severe concentric left ventricular   hypertrophy. Hyperdynamic left ventricular systolic function. Left   ventricular ejection fraction is visually estimated to be greater than   75%. Abnormal septal motion consistent with right ventricular (RV)   volume overload and/or elevated RV end-diastolic pressure.   Indeterminate diastolic function. Near mid cavity obstruction.  Severely dilated right ventricle.  Right ventricular systolic pressure is estimated to be 80 mmHg.  Pulmonary hypertension probably secondary to pulmonary disease.  Heart catheterization considered while in the hospital  Diastolic heart failure: MRI of the heart negative for amyloidosis.  Transthyretin test pending  UPEP with polyclonal spikes,  SPEP w/o spikes. Hematology signed off  Moderate pulmonary hypertension on heart catheterization, negative reactivity test

## 2018-12-21 NOTE — H&P
CHIEF COMPLAINT:  Shortness of breath.    HISTORY OF PRESENT ILLNESS:  This is a 58-year-old gentleman who reports   shortness of breath beginning he thinks about a month ago.  It has been   getting worse slowly over the time and especially when he exerts himself.  It   is also present at night and he has difficulty lying flat to get sleep.    During that time, it was also noted increasing leg swelling.  He has not had   any chest pain, unusual back, neck, jaw or shoulder pain.  He does note a   cough, which is productive of a little bit of sputum, but not what he feels is   anything significant.  He has not had any fever or chills.    PREVIOUS MEDICAL HISTORY:  Current tobacco use.    CURRENT MEDICATIONS:  None.    ALLERGIES:  No known drug allergies.    SOCIAL HISTORY:  Patient smokes about 5 cigarettes a day.  He does not drink   any alcohol.  He does not use any recreational drugs.  He currently works in a   warehouse setting.    SURGICAL HISTORY:  Denies.    FAMILY HISTORY:  Significant for hypertension.    PHYSICAL EXAMINATION:  VITAL SIGNS:  In ED, temp 36.9, heart rate 100, respiratory rate 24, BP is   142/106, and satting in the mid 90s on 6 L nasal cannula.  GENERAL:  The patient is awake and alert.  He is in no acute distress.  HEENT:  Head is normocephalic and atraumatic.  Mucous membranes are moist.    Sclerae and conjunctivae are benign.  NECK:  Positive JVD.  No bruits.  RESPIRATORY:  Crackles in bilateral bases up to the mid lungs.  CARDIAC:  Relative tachycardia, heart rate in the 90s, II/VI systolic murmur.  ABDOMEN:  Soft, no guarding, rebound, hepatosplenomegaly or masses.  EXTREMITIES:  2+ pitting edema, which extends above the knee.  SKIN:  Warm and dry.  No rashes are appreciated.  NEUROLOGIC:  Alert and oriented.  Speech is clear, content logical.  PSYCHIATRIC:  Mood and affect are appropriate.  Hygiene is neat and clean.    LABORATORY DATA:  White count is 13.7, hemoglobin 16.4, and platelet  count   330.  Sodium 139, potassium 4.1, BUN 22, and creatinine 1.02.  LFTs are within   normal limits.  Magnesium is 1.9, lactic acid 1.4.  Troponin I is 0.15.  BNP   is 1146, TSH is 1.360.    IMAGING STUDIES:  Portable chest x-rays reviewed by myself demonstrates   significant pulmonary edema.  It appears to be cardiogenic in nature, there   are bilateral pleural effusions greater on the right side than the left.    There is some cardiomegaly as well.  EKG reviewed by myself showed sinus   rhythm with a rate of 108.  There is some evidence of LVH.  ST segments are   relatively benign, though there are some T-wave inversions noted in the   inferior leads.  Impression, sinus tachycardia.    ASSESSMENT AND PLAN:  This is a 58-year-old gentleman with problem list as   follows:  1.  Acute new diagnosis of heart failure, likely systolic heart failure.    Patient will be admitted.  We will start to diurese him.  We will decrease   afterload with an ACE inhibitor.  Hold off on a beta-blockade in the setting   of acute failure.  Follow his I's and O's carefully.  Check cardiac echo.    Consult cardiology once the echo is back.  Consider further workup in terms of   ischemic evaluation.  Once the echo is back, we will see more about how this   patient is doing.  2.  Acute hypoxic respiratory failure:  This would appear to be secondary to   above.  3.  Current tobacco use:  Encourage cessation.    Given the serious nature of this new diagnosis, the patient will require   greater than 2 midnights in the hospital.       ____________________________________     DO DOMINIQUE Rodgers / NATIVIDAD    DD:  12/20/2018 18:16:46  DT:  12/20/2018 20:31:41    D#:  [id number]  Job#:  [id number]

## 2018-12-21 NOTE — PROGRESS NOTES
Bedside report received. Patient A&O x4. 6L oxy mask.  Lungs crackle with ascultation. Pt presenting with tachypnea. SR on the monitor. Pt BP continues to be soft, 90s/60s.  Repeat ABGs done overnight and results worsened.  Dr. Hanna updated.  Orders given to redraw ABGs at 0900 and report back to MD. No complaints of pain at this time. Patient verbalized understanding. Call light and belongings within reach. Bed locked and in lowest position, alarm and fall precautions in place.

## 2018-12-21 NOTE — PROGRESS NOTES
Gloria from Lab called with critical result of pCO2 60.3 at 1255. Critical lab result read back to Gloria.   Dr. Hanna notified of critical lab result at 1300.  Critical lab result read back by Dr. Hanna.  Per Dr. Hanna, plan is to consult pulmonology.

## 2018-12-21 NOTE — PROGRESS NOTES
"0606: Dr. Perez paged about 2nd ABG results    This RN also described the patients condition stating that he is a/ox4 and he can hold small conversations at this time. This RN gave the patient a urinal to use and he fell asleep 3 times trying to use it. The patient has fallen asleep or appeared lethargic during multiple conversations with this RN.     Response: \"I will page Dr. Barron on the matter, as I am now off for the night.\"      0620: this RN updated Dr. Barron on pts condition and advocated for Bipap or Cpap.     Dr. Villatoro response: \"Given the patients non-emergent condition, we will page day shift hospitalist and have them consult pulmonology.\"  "

## 2018-12-21 NOTE — PROGRESS NOTES
Critical ABG results reported to Dr. Ortega.    Dr. Ortega at [facility]. Order to call in house physician to bedside to evaluate need for intervention.

## 2018-12-21 NOTE — PROGRESS NOTES
1445: patient transferred from telemetry 8 to [facility] with RN and RT on bipap and transport monitor, VSS, patient oriented to unit and use of call light. All questions and concerns answered at this time. 1510:  patient transferred to CT with RN, consent signed and placed in patients chart. 1525: Patient brought back up to unit with RN.

## 2018-12-21 NOTE — PROGRESS NOTES
2 RN skin check completed with Alisa BERNAL:    BLE swelling noted.    Otherwise skin is CDI throughout.

## 2018-12-21 NOTE — CARE PLAN
Problem: Communication  Goal: The ability to communicate needs accurately and effectively will improve  Outcome: PROGRESSING AS EXPECTED  Discussed POC and medications with patient. Pt educated on echocardiogram and diuresis with lasix. Pt verbalized understanding.      Problem: Respiratory:  Goal: Respiratory status will improve  Outcome: PROGRESSING SLOWER THAN EXPECTED  Pt on 6L oxy mask.  Pt educated to use IS and deep breath to promote lung expansion and gas exchange.  Pt verbalized understanding and properly demonstrated use of IS.

## 2018-12-21 NOTE — PROGRESS NOTES
Cardiovascular Nurse Navigator () Advanced Heart Failure Program Inpatient Progress Note:     Chief Complaint: SOB    Please note that patient was diagnosed with HF by ERP and Hospitalists. Echo pending no priors on file.    If any education is provided to patient and family, please specify that heart failure is a working diagnosis until MD has confirmed diagnosis and has discussed it with the patient.     If after echocardiogram results are available, heart failure is ruled out, respectfully request that:    1. Attending provider clearly state in note that HF is ruled out. If this is not done, patient will still code for heart failure.    2. Remove HF from the problems list so that staff are not confused about necessary interventions and patient does not receive education on a diagnosis he does not have.    If echocardiogram results do not eliminate HF as a diagnosis, please consult cardiology as is expected for all new HF diagnoses.     Thank you and please call with questions, Carmen

## 2018-12-21 NOTE — PROGRESS NOTES
Rebeca from Lab called with critical result of CO2 59.1 and CO2-TC 58.3 at 0815. Critical lab result read back to Rebeca.   Dr. Hanna notified of critical lab result at 0830.  Critical lab result read back by Dr. Hanna.  Dr. Hanna also updated on troponin result of 0.15 yesterday and redraw requested by RN.    Orders given to draw troponin and lactic acid.

## 2018-12-21 NOTE — PROGRESS NOTES
Pt has new order for metoprolol.  Pt BP's have been ranging from 80s-90s systolic.  Dr. Hanna notified.  Per Dr. Hanna, okay to hold metoprolol.  Pt also experienced bloody stool with jelly consistency so Dr. Hanna updated.  Orders given to get an occult blood stool.

## 2018-12-21 NOTE — PROGRESS NOTES
"Report received at bedside, assumed care. Pt is awake but lethargic in bed. Pt states he is \"just tired\". A&O x 4. Pt denies pain. No other concerns, complains or distress. Tele box on. Chart reviewed and POC updated with patient. Bed in lowest position and call light within reach.   "

## 2018-12-22 PROBLEM — J96.01 ACUTE RESPIRATORY FAILURE WITH HYPOXIA AND HYPERCAPNIA (HCC): Status: ACTIVE | Noted: 2018-12-22

## 2018-12-22 PROBLEM — K92.2 GI BLEED: Status: ACTIVE | Noted: 2018-12-22

## 2018-12-22 PROBLEM — R79.89 ELEVATED TROPONIN: Status: ACTIVE | Noted: 2018-12-22

## 2018-12-22 PROBLEM — J96.02 ACUTE RESPIRATORY FAILURE WITH HYPOXIA AND HYPERCAPNIA (HCC): Status: ACTIVE | Noted: 2018-12-22

## 2018-12-22 PROBLEM — I27.20 PULMONARY HYPERTENSION (HCC): Status: ACTIVE | Noted: 2018-12-22

## 2018-12-22 LAB
ALBUMIN SERPL BCP-MCNC: 2.7 G/DL (ref 3.2–4.9)
ALBUMIN/GLOB SERPL: 1.1 G/DL
ALP SERPL-CCNC: 64 U/L (ref 30–99)
ALT SERPL-CCNC: 16 U/L (ref 2–50)
ANION GAP SERPL CALC-SCNC: 2 MMOL/L (ref 0–11.9)
APPEARANCE UR: CLEAR
AST SERPL-CCNC: 14 U/L (ref 12–45)
BASOPHILS # BLD AUTO: 0.2 % (ref 0–1.8)
BASOPHILS # BLD: 0.03 K/UL (ref 0–0.12)
BILIRUB SERPL-MCNC: 0.7 MG/DL (ref 0.1–1.5)
BILIRUB UR QL STRIP.AUTO: NEGATIVE
BUN SERPL-MCNC: 29 MG/DL (ref 8–22)
CALCIUM SERPL-MCNC: 8.7 MG/DL (ref 8.5–10.5)
CHLORIDE SERPL-SCNC: 105 MMOL/L (ref 96–112)
CO2 SERPL-SCNC: 32 MMOL/L (ref 20–33)
COLOR UR: YELLOW
CREAT SERPL-MCNC: 0.9 MG/DL (ref 0.5–1.4)
EOSINOPHIL # BLD AUTO: 0.01 K/UL (ref 0–0.51)
EOSINOPHIL NFR BLD: 0.1 % (ref 0–6.9)
ERYTHROCYTE [DISTWIDTH] IN BLOOD BY AUTOMATED COUNT: 40 FL (ref 35.9–50)
ERYTHROCYTE [SEDIMENTATION RATE] IN BLOOD BY WESTERGREN METHOD: 5 MM/HOUR (ref 0–20)
GLOBULIN SER CALC-MCNC: 2.4 G/DL (ref 1.9–3.5)
GLUCOSE SERPL-MCNC: 97 MG/DL (ref 65–99)
GLUCOSE UR STRIP.AUTO-MCNC: NEGATIVE MG/DL
HCT VFR BLD AUTO: 41.5 % (ref 42–52)
HGB BLD-MCNC: 15 G/DL (ref 14–18)
IMM GRANULOCYTES # BLD AUTO: 0.05 K/UL (ref 0–0.11)
IMM GRANULOCYTES NFR BLD AUTO: 0.4 % (ref 0–0.9)
INR PPP: 1.1 (ref 0.87–1.13)
KETONES UR STRIP.AUTO-MCNC: NEGATIVE MG/DL
LDH SERPL L TO P-CCNC: 175 U/L (ref 107–266)
LEUKOCYTE ESTERASE UR QL STRIP.AUTO: NEGATIVE
LYMPHOCYTES # BLD AUTO: 1.01 K/UL (ref 1–4.8)
LYMPHOCYTES NFR BLD: 8 % (ref 22–41)
MAGNESIUM SERPL-MCNC: 1.8 MG/DL (ref 1.5–2.5)
MCH RBC QN AUTO: 31.6 PG (ref 27–33)
MCHC RBC AUTO-ENTMCNC: 36.1 G/DL (ref 33.7–35.3)
MCV RBC AUTO: 87.4 FL (ref 81.4–97.8)
MICRO URNS: NORMAL
MONOCYTES # BLD AUTO: 0.6 K/UL (ref 0–0.85)
MONOCYTES NFR BLD AUTO: 4.7 % (ref 0–13.4)
NEUTROPHILS # BLD AUTO: 10.94 K/UL (ref 1.82–7.42)
NEUTROPHILS NFR BLD: 86.6 % (ref 44–72)
NITRITE UR QL STRIP.AUTO: NEGATIVE
NRBC # BLD AUTO: 0 K/UL
NRBC BLD-RTO: 0 /100 WBC
PH UR STRIP.AUTO: 5 [PH]
PLATELET # BLD AUTO: 266 K/UL (ref 164–446)
PMV BLD AUTO: 9.1 FL (ref 9–12.9)
POTASSIUM SERPL-SCNC: 4.4 MMOL/L (ref 3.6–5.5)
PROT SERPL-MCNC: 5.1 G/DL (ref 6–8.2)
PROT UR QL STRIP: NEGATIVE MG/DL
PROTHROMBIN TIME: 14.3 SEC (ref 12–14.6)
RBC # BLD AUTO: 4.75 M/UL (ref 4.7–6.1)
RBC UR QL AUTO: NEGATIVE
SODIUM SERPL-SCNC: 139 MMOL/L (ref 135–145)
SP GR UR STRIP.AUTO: 1.01
UROBILINOGEN UR STRIP.AUTO-MCNC: 0.2 MG/DL
WBC # BLD AUTO: 12.6 K/UL (ref 4.8–10.8)

## 2018-12-22 PROCEDURE — 770022 HCHG ROOM/CARE - ICU (200)

## 2018-12-22 PROCEDURE — 99233 SBSQ HOSP IP/OBS HIGH 50: CPT | Performed by: HOSPITALIST

## 2018-12-22 PROCEDURE — 94003 VENT MGMT INPAT SUBQ DAY: CPT

## 2018-12-22 PROCEDURE — 700111 HCHG RX REV CODE 636 W/ 250 OVERRIDE (IP): Performed by: INTERNAL MEDICINE

## 2018-12-22 PROCEDURE — 94668 MNPJ CHEST WALL SBSQ: CPT

## 2018-12-22 PROCEDURE — 86255 FLUORESCENT ANTIBODY SCREEN: CPT

## 2018-12-22 PROCEDURE — 85610 PROTHROMBIN TIME: CPT

## 2018-12-22 PROCEDURE — 85652 RBC SED RATE AUTOMATED: CPT

## 2018-12-22 PROCEDURE — 86038 ANTINUCLEAR ANTIBODIES: CPT

## 2018-12-22 PROCEDURE — 99233 SBSQ HOSP IP/OBS HIGH 50: CPT | Performed by: INTERNAL MEDICINE

## 2018-12-22 PROCEDURE — 80053 COMPREHEN METABOLIC PANEL: CPT

## 2018-12-22 PROCEDURE — 83615 LACTATE (LD) (LDH) ENZYME: CPT

## 2018-12-22 PROCEDURE — 700111 HCHG RX REV CODE 636 W/ 250 OVERRIDE (IP): Performed by: HOSPITALIST

## 2018-12-22 PROCEDURE — 99291 CRITICAL CARE FIRST HOUR: CPT | Performed by: INTERNAL MEDICINE

## 2018-12-22 PROCEDURE — 83735 ASSAY OF MAGNESIUM: CPT

## 2018-12-22 PROCEDURE — 85025 COMPLETE CBC W/AUTO DIFF WBC: CPT

## 2018-12-22 RX ORDER — MAGNESIUM SULFATE HEPTAHYDRATE 40 MG/ML
2 INJECTION, SOLUTION INTRAVENOUS ONCE
Status: COMPLETED | OUTPATIENT
Start: 2018-12-22 | End: 2018-12-22

## 2018-12-22 RX ORDER — FUROSEMIDE 10 MG/ML
20 INJECTION INTRAMUSCULAR; INTRAVENOUS
Status: DISCONTINUED | OUTPATIENT
Start: 2018-12-22 | End: 2018-12-23

## 2018-12-22 RX ADMIN — MAGNESIUM SULFATE IN WATER 2 G: 40 INJECTION, SOLUTION INTRAVENOUS at 09:38

## 2018-12-22 RX ADMIN — FUROSEMIDE 20 MG: 10 INJECTION, SOLUTION INTRAMUSCULAR; INTRAVENOUS at 16:56

## 2018-12-22 RX ADMIN — FUROSEMIDE 20 MG: 10 INJECTION, SOLUTION INTRAMUSCULAR; INTRAVENOUS at 08:58

## 2018-12-22 ASSESSMENT — ENCOUNTER SYMPTOMS
SHORTNESS OF BREATH: 1
CHILLS: 0
COUGH: 1
SPEECH CHANGE: 0
SORE THROAT: 0
DEPRESSION: 0
NAUSEA: 0
HEADACHES: 0
HEMOPTYSIS: 0
BACK PAIN: 0
NECK PAIN: 0
SPUTUM PRODUCTION: 0
FEVER: 0
FOCAL WEAKNESS: 0
PALPITATIONS: 0
WHEEZING: 0
BLURRED VISION: 0
ABDOMINAL PAIN: 0
NERVOUS/ANXIOUS: 0
VOMITING: 0
SPUTUM PRODUCTION: 1

## 2018-12-22 ASSESSMENT — PAIN SCALES - GENERAL
PAINLEVEL_OUTOF10: 0

## 2018-12-22 ASSESSMENT — PULMONARY FUNCTION TESTS
EPAP_CMH2O: 7
EPAP_CMH2O: 7

## 2018-12-22 NOTE — PROGRESS NOTES
"Interval History:  Still has significant respiratory distress,  Slightly better than yesterday.    Physical Exam   Blood pressure (!) 95/66, pulse 93, temperature 37.2 °C (99 °F), temperature source Zelaya, resp. rate (!) 29, height 1.93 m (6' 4\"), weight 85.7 kg (188 lb 15 oz), SpO2 95 %.    Constitutional:  Appears well-developed.   HENT: Normocephalic and atraumatic. No scleral icterus.   Neck: JVD is present.   Cardiovascular: Normal rate. Exam reveals no gallop and no friction rub. No murmur heard.   Pulmonary/Chest: Coarse   Abdominal: S/NT/ND BS+   Musculoskeletal: Exhibits no edema. Pulses present.  Skin: Skin is warm and dry.         Intake/Output Summary (Last 24 hours) at 12/22/18 1101  Last data filed at 12/22/18 0800   Gross per 24 hour   Intake              560 ml   Output             1195 ml   Net             -635 ml       Recent Labs      12/20/18   1045  12/22/18   0440   WBC  13.7*  12.6*   RBC  5.34  4.75   HEMOGLOBIN  16.4  15.0   HEMATOCRIT  47.0  41.5*   MCV  88.0  87.4   MCH  30.7  31.6   MCHC  34.9  36.1*   RDW  40.5  40.0   PLATELETCT  330  266   MPV  9.2  9.1     Recent Labs      12/20/18   1045  12/21/18   0405  12/22/18   0440   SODIUM  139  139  139   POTASSIUM  4.1  4.9  4.4   CHLORIDE  103  104  105   CO2  30  29  32   GLUCOSE  100*  118*  97   BUN  22  29*  29*   CREATININE  1.02  1.06  0.90   CALCIUM  9.0  8.3*  8.7     Recent Labs      12/22/18   0940   INR  1.10     Recent Labs      12/20/18   1045   BNPBTYPENAT  1146*     Recent Labs      12/20/18   1045  12/21/18   0854  12/21/18   1800   CPKTOTAL   --    --   52   TROPONINI  0.15*  0.09*   --    BNPBTYPENAT  1146*   --    --      Recent Labs      12/21/18   0405   TRIGLYCERIDE  46   HDL  27*   LDL  73       No current facility-administered medications on file prior to encounter.      No current outpatient prescriptions on file prior to encounter.       Current Facility-Administered Medications   Medication Dose Frequency Provider " Last Rate Last Dose   • furosemide (LASIX) injection 20 mg  20 mg BID DIURETIC Mp Al M.D.   20 mg at 12/22/18 0858   • magnesium sulfate IVPB premix 2 g  2 g Once Cheng Bailey D.O. 25 mL/hr at 12/22/18 0938 2 g at 12/22/18 0938   • ipratropium-albuterol (DUONEB) nebulizer solution  3 mL Q4H PRN (RT) Mp Al M.D.       • senna-docusate (PERICOLACE or SENOKOT S) 8.6-50 MG per tablet 2 Tab  2 Tab BID JOJO Enriquez.O.   Stopped at 12/20/18 1800    And   • polyethylene glycol/lytes (MIRALAX) PACKET 1 Packet  1 Packet QDAY PRN Navid Grey D.O.        And   • magnesium hydroxide (MILK OF MAGNESIA) suspension 30 mL  30 mL QDAY PRN Navid Grey D.O.        And   • bisacodyl (DULCOLAX) suppository 10 mg  10 mg QDAY PRN Navid Grey D.O.       • Respiratory Care per Protocol   Continuous RT Navid Grey D.O.       • acetaminophen (TYLENOL) tablet 650 mg  650 mg Q6HRS PRN Navid Grey D.O.       • ondansetron (ZOFRAN) syringe/vial injection 4 mg  4 mg Q4HRS PRN Navid Grey D.O.       • ondansetron (ZOFRAN ODT) dispertab 4 mg  4 mg Q4HRS PRN Navid Grey D.O.       • promethazine (PHENERGAN) tablet 12.5-25 mg  12.5-25 mg Q4HRS PRN Navid Grey D.O.       • promethazine (PHENERGAN) suppository 12.5-25 mg  12.5-25 mg Q4HRS PRN Navid Grey D.O.       • prochlorperazine (COMPAZINE) injection 5-10 mg  5-10 mg Q4HRS PRN Navid Grey D.O.         Last reviewed on 12/20/2018 11:32 PM by Mp Ryder R.N.  Medications reviewed    Imaging reviewed    ECHO(12/21/2018):  No prior study is available for comparison.   Left ventricle is small in size. Severe concentric left ventricular   hypertrophy. Hyperdynamic left ventricular systolic function. Left   ventricular ejection fraction is visually estimated to be greater than   75%. Abnormal septal motion consistent with right ventricular  (RV)   volume overload and/or elevated RV end-diastolic pressure.   Indeterminate diastolic function. Near mid cavity obstruction.  Severely dilated right ventricle.  Right ventricular systolic pressure is estimated to be 80 mmHg.    CTA chest:  1.  No central or segmental pulmonary embolus  2.  Emphysema  3.  Bibasilar atelectasis with superimposed pneumonia not excluded  4.  Small to moderate RIGHT pleural effusion  5.  Small LEFT pleural effusion  6.  Interstitial pulmonary edema or possibly chronic interstitial lung disease such as IPF/UIP.  7.  Ascites  8.  Findings suggestive of pulmonary arterial hypertension  9.  Small pericardial effusion    Impressions:  1.  Respiratory distress secondary to diastolic heart failure  2.  Possible interstitial lung disease  3.  Infiltrative cardiomyopathy    Recommendations:  Slightly better, still has significant respiratory distress.  Treatment of possible interstitial lung disease/COPD according to the critical care physician.  Recommend transfer to higher level hospital for further evaluation and treatment of his infiltrative cardiomyopathy.  We do not have resources for diagnosis, I cannot offer much other than Lasix.  Right heart cath can be performed, will give some information, however will not aid in diagnosis of underlying problem.    Discussed with primary physician and bedside nursing.    Do not hesitate to call me with questions regarding the patient care.    Anderson Murillo  Interventional cardiologist  Cell: [phone]

## 2018-12-22 NOTE — PROGRESS NOTES
[facility] Medicine Daily Progress Note    Date of Service  12/22/2018    Chief Complaint  SHort of breath    Hospital Course    58 y.o. male admitted 12/20/2018 with shortness of breath and increasing leg swelling over the prior 4 weeks.  He required BiPAP therapy in ICU       Interval Problem Update  On nasal canula  Soft BP  Bloody stool last night    Consultants/Specialty  Pulmonary  Cardiology    Code Status  FULL    Disposition  Monitor in ICU    Review of Systems  Review of Systems   Constitutional: Negative for chills and fever.   HENT: Negative for congestion and sore throat.    Respiratory: Positive for cough and shortness of breath. Negative for sputum production.    Cardiovascular: Positive for leg swelling. Negative for chest pain and palpitations.   Gastrointestinal: Negative for abdominal pain, nausea and vomiting.   Genitourinary: Negative for dysuria.   Musculoskeletal: Negative for back pain and neck pain.   Neurological: Negative for speech change and headaches.   Psychiatric/Behavioral: Negative for depression. The patient is not nervous/anxious.         Physical Exam  Temp:  [37.2 °C (99 °F)-37.5 °C (99.5 °F)] 37.5 °C (99.5 °F)  Pulse:  [] 103  Resp:  [14-37] 28    Physical Exam   Constitutional: He is oriented to person, place, and time. He appears well-developed and well-nourished. No distress.   HENT:   Head: Normocephalic and atraumatic.   Nose: Nose normal.   Mouth/Throat: Oropharynx is clear and moist.   Eyes: Conjunctivae and EOM are normal. Right eye exhibits no discharge. Left eye exhibits no discharge. No scleral icterus.   Neck: No tracheal deviation present.   Cardiovascular: Normal rate, regular rhythm, normal heart sounds and intact distal pulses.    No murmur heard.  Pulmonary/Chest: Effort normal. No stridor. No respiratory distress. He has no wheezes. He has rales.   Abdominal: Soft. Bowel sounds are normal. He exhibits no distension. There is no tenderness.    Musculoskeletal: He exhibits edema.   Lymphadenopathy:     He has no cervical adenopathy.   Neurological: He is alert and oriented to person, place, and time. No cranial nerve deficit.   Skin: Skin is warm. He is not diaphoretic.   Psychiatric: He has a normal mood and affect. His behavior is normal. Thought content normal.   Vitals reviewed.      Fluids    Intake/Output Summary (Last 24 hours) at 12/22/18 1954  Last data filed at 12/22/18 1800   Gross per 24 hour   Intake             1170 ml   Output             1845 ml   Net             -675 ml       Laboratory  Recent Labs      12/20/18   1045  12/22/18   0440   WBC  13.7*  12.6*   RBC  5.34  4.75   HEMOGLOBIN  16.4  15.0   HEMATOCRIT  47.0  41.5*   MCV  88.0  87.4   MCH  30.7  31.6   MCHC  34.9  36.1*   RDW  40.5  40.0   PLATELETCT  330  266   MPV  9.2  9.1     Recent Labs      12/20/18   1045  12/21/18   0405  12/22/18   0440   SODIUM  139  139  139   POTASSIUM  4.1  4.9  4.4   CHLORIDE  103  104  105   CO2  30  29  32   GLUCOSE  100*  118*  97   BUN  22  29*  29*   CREATININE  1.02  1.06  0.90   CALCIUM  9.0  8.3*  8.7     Recent Labs      12/22/18   0940   INR  1.10     Recent Labs      12/20/18   1045   BNPBTYPENAT  1146*     Recent Labs      12/21/18   0405   TRIGLYCERIDE  46   HDL  27*   LDL  73       Imaging  CT-CTA CHEST PULMONARY ARTERY W/ RECONS   Final Result      1.  No central or segmental pulmonary embolus   2.  Emphysema   3.  Bibasilar atelectasis with superimposed pneumonia not excluded   4.  Small to moderate RIGHT pleural effusion   5.  Small LEFT pleural effusion   6.  Interstitial pulmonary edema or possibly chronic interstitial lung disease such as IPF/UIP.   7.  Ascites   8.  Findings suggestive of pulmonary arterial hypertension   9.  Small pericardial effusion         EC-ECHOCARDIOGRAM COMPLETE W/O CONT   Final Result      DX-CHEST-PORTABLE (1 VIEW)   Final Result         1. Cardiomegaly and bibasilar interstitial/alveolar opacities,  right worse than left. They likely represent consolidations and/or edema. Multifocal pneumonia can be considered in the appropriate clinical settings.   2. Small right pleural effusion.           Assessment/Plan  * Acute respiratory failure with hypoxia and hypercapnia (HCC)   Assessment & Plan    Concern of cardiomyopathy with restriction from hypertrophy  Cardiology consulting and discussion with [facility] for possible transfer for further work up.  Diuresing and monitor strict I/O's  Monitor vitals, labs  On BiPAP  Rt protocol  Pulmonary consulting     GI bleed   Assessment & Plan    Bloody mucus-like per RN  Patient denies hemorrhoids  Monitor Hgb  12/21 Hgb:12.6  12/22 Hgb:13.7     Pulmonary hypertension (HCC)   Assessment & Plan    RVSP: 80mmHg  CTA negative for PE  Supplemental oxygen  Cardiology consulting     CHF (congestive heart failure) ([facility])   Assessment & Plan    Question of restrictive cardiomyopathy  Cardiology trying to transfer to [facility].  Diuresis, strict I/O's, monitor labs  Check FAUSTINA, ESR, ACE, ANCA  Sarcoid, amyloidosis, cocaine use?  12/21 Echo: Left ventricle is small in size. Severe concentric left ventricular   hypertrophy. Hyperdynamic left ventricular systolic function. Left   ventricular ejection fraction is visually estimated to be greater than   75%. Abnormal septal motion consistent with right ventricular (RV)   volume overload and/or elevated RV end-diastolic pressure.   Indeterminate diastolic function. Near mid cavity obstruction.  Severely dilated right ventricle.  Right ventricular systolic pressure is estimated to be 80 mmHg.       Elevated troponin   Assessment & Plan    C/O CHF as etiology  monitor     Hypercapnia   Assessment & Plan    On BiPAP  Monitor abg  Pulmonary consulting          VTE prophylaxis: SCD

## 2018-12-22 NOTE — CONSULTS
DATE OF SERVICE:  12/21/2018    PULMONARY AND CRITICAL CARE CONSULTATION    REQUESTING PHYSICIAN:  Farzana Hanna MD    REASON FOR REQUEST:  Acute hypoxic respiratory failure.    HISTORY OF PRESENT ILLNESS:  This is a 58-year-old  is a male who reports no   past medical history, smokes 3-5 cigarettes on a daily basis, presented to the   hospital on 12/20/2018 with complaints of 2 weeks of increasing dyspnea on   exertion, generalized fatigue, 4 weeks of increased lower extremity swelling   and abdominal distention.  Denies any fever, chills, sweats.  Denies any   active chest pain, no syncopal episodes, lightheadedness.  Denies any nausea,   vomiting, abdominal pain.  No diarrhea or dysuria.  Denies any recent travel.    Denies any history of clotting disorders.  Denies any stimulant utilization   or drug use. Patient was initially admitted to telemetry being worked up for   congestive heart failure given a BNP of greater than 1000 and hypoxia;   however, he continued to decline requiring BiPAP therapy and subsequently   transferred to the intensive care unit.    ALLERGIES:  No known drug allergies.    OUTPATIENT MEDICATIONS:  None.  Reports rare use of ibuprofen.    PAST MEDICAL HISTORY:  Reportedly, none.    SOCIAL HISTORY:  Three to five cigarettes per day times 5 years.  No drugs or alcohol.    Denies any stimulant use for maintaining awakeness. Has a pet cat, no other animals.  He works as a .     FAMILY HISTORY:  Hypertension.    REVIEW OF SYSTEMS:  As indicated above in HPI, otherwise fully reviewed and   negative.    PHYSICAL EXAMINATION:  VITAL SIGNS:  Temperature 99.2, pulse rate 107, blood pressure 95/66, satting   98% on 50% FiO2 on BiPAP therapy.  GENERAL:  Patient appears in respiratory distress, otherwise relatively slim,   appears stated age.  HEENT:  Normocephalic, atraumatic.  Anicteric.  CARDIOVASCULAR:  Tachy rate, regular rhythm.  RESPIRATORY:  Diminished with faint bibasilar  crackles.  ABDOMEN:  Mild distention.  Positive bowel sounds.  No tenderness to   palpation, no rebound or guarding.  EXTREMITIES:  With 2+ bilateral lower extremity edema.  NEUROLOGIC:  Cranial nerves II-XII appear grossly intact.  PSYCHIATRIC:  Normal affect and behavior.    LABORATORY DATA:  CBC with white count of 13.7, H and H of 16 and 47, platelet   count 330.  Positive left shift, no bands.  Chem panel with sodium 139,   potassium 4.9, chloride 104, bicarbonate 29, anion gap 6, glucose 118, BUN 29,   creatinine 1.06, calcium 8.3, magnesium 1.8.  Lactic acid 2.  Troponin from   0.15-0.09, BNP of 1146.  Occult blood feces positive.  ABG showing pH 7.31,   pCO2 of 60, pO2 of 68, satting 92% at that time on 5 liters.  Blood cultures   x2 negative.    IMAGING:  Echo pending official read.  My evaluation of images appears to have   significantly dilated right ventricle with nearly obliterating left   ventricle.    ASSESSMENT:  1.  Acute hypoxic hypercarbic respiratory failure.  2.  Clinically severe pulmonary hypertension and right heart failure, acute,   decompensated.  3.  Leukocytosis.  4.  Elevated troponin.  5.  Fecal occult blood test positive.  6.  Chronic tobacco use.    PLAN:  Again, this is a 58-year-old gentleman with above indicated history and   presentation transferred to the ICU for decompensating respiratory status.    On my personal review of echo as well as performing my own bedside evaluation   with ultrasound appears to have a severely dilated right ventricle.  Denies   any history of clotting disorders.  Denies any utilization of drugs such as   cocaine, methamphetamines or other stimulant use over the counter or illegal   in the nature.  Denies any familial history of pulmonary hypertension.    Indicates all these symptoms have come on acutely over the past 2-4 weeks.  At   this time, I will get a stat CT chest to evaluate for pulmonary embolism as   the etiology of the RV failure as since he  has been in the hospital patient   was being diuresed, does not appear to have received any volume boluses to explain RV overload.  We   will also get lower extremity ultrasound.  If this would be negative for   pulmonary embolism, would then need to evaluate for primary pulmonary arterial   hypertension as well as underlying lung disease.  I will discontinue patient's recently initiated lisinopril,   metoprolol and Lasix given he has underlying labile blood pressure with near   complete closure of his LV contraction.  The patient will be placed on   electrolyte replacement protocol, keeping potassium greater than 4 and   magnesium greater than 2, ionized calcium greater than 1.1.  He will need   further evaluation for his occult blood feces test.  I think he could be   carefully started on anticoagulation with heparin infusion if positive for PE.  There might be   some underlying concern if he were to require TPA as this certainly would be   submassive to massive in nature.  Given the diuresis and recent   antihypertensive medication, he may be in a submassive state with right heart   strain.  EKG did show S1, faint Q3 and what appeared to be an inverted T-wave   in lead III as well that would be consistent with right heart strain as well   as elevated troponin.  Other consideration could be EKOS with directed TPA.    Patient will be encouraged to abstain from further tobacco use.  He will be on   RT and O2 protocols.  Pending imaging will determine further workup and evaluation.    Prognosis is extremely guarded.    Total critical care time not including billable procedures is 55 minutes.       ____________________________________     MD INOCENCIO Ceballos / NATIVIDAD    DD:  12/21/2018 15:15:54  DT:  12/21/2018 18:24:13    D#:  [id number]  Job#:  [id number]

## 2018-12-22 NOTE — CONSULTS
Reason for Consult:  Asked by Dr Hanna to see this patient with  Heart failure  Patient's PCP: No primary care provider on file.    CC:   Chief Complaint   Patient presents with   • Shortness of Breath     x 1 week progressively getting worse. arrived in triage hypoxic on ra w/sat of 60%. placed on 6l/nc still hypoxic 88%. pt able to speak in full sentences.   • Cough     productive cough x 1 week. states coughing up greenish sputum       HPI:    58-year-old male patient with no prior cardiac history presented with shortness of breath of a few days duration, gradually worsening along with cough, greenish sputum, orthopnea.  No associated chest pains.  No prior history of high blood pressure.  Hypertension in the family, no family history of heart problems, however he does not know for sure.  smoker, no illicit drugs.    Medications / Drug list prior to admission:  No current facility-administered medications on file prior to encounter.      No current outpatient prescriptions on file prior to encounter.       Current list of administered Medications:    Current Facility-Administered Medications:   •  Respiratory Care per Protocol, , Nebulization, Continuous RT, Mp Al M.D.  •  ipratropium-albuterol (DUONEB) nebulizer solution, 3 mL, Nebulization, Q4H PRN (RT), Mp Al M.D.  •  senna-docusate (PERICOLACE or SENOKOT S) 8.6-50 MG per tablet 2 Tab, 2 Tab, Oral, BID, Stopped at 12/20/18 1800 **AND** polyethylene glycol/lytes (MIRALAX) PACKET 1 Packet, 1 Packet, Oral, QDAY PRN **AND** magnesium hydroxide (MILK OF MAGNESIA) suspension 30 mL, 30 mL, Oral, QDAY PRN **AND** bisacodyl (DULCOLAX) suppository 10 mg, 10 mg, Rectal, QDAY PRN, Navid Grey DDOMINIC.  •  Respiratory Care per Protocol, , Nebulization, Continuous RT, Naivd Grey D.O.  •  enoxaparin (LOVENOX) inj 40 mg, 40 mg, Subcutaneous, DAILY, Navid Grey D.O., Stopped at 12/21/18 0600  •  acetaminophen (TYLENOL)  tablet 650 mg, 650 mg, Oral, Q6HRS PRN, Navid Grey D.O.  •  ondansetron (ZOFRAN) syringe/vial injection 4 mg, 4 mg, Intravenous, Q4HRS PRN, Navid Grey D.O.  •  ondansetron (ZOFRAN ODT) dispertab 4 mg, 4 mg, Oral, Q4HRS PRN, Navid Grey D.O.  •  promethazine (PHENERGAN) tablet 12.5-25 mg, 12.5-25 mg, Oral, Q4HRS PRN, Navid Grey D.O.  •  promethazine (PHENERGAN) suppository 12.5-25 mg, 12.5-25 mg, Rectal, Q4HRS PRN, Navid Grey, D.O.  •  prochlorperazine (COMPAZINE) injection 5-10 mg, 5-10 mg, Intravenous, Q4HRS PRN, Navid Grey, D.O.      Social History     Social History   • Marital status:      Spouse name: N/A   • Number of children: N/A   • Years of education: N/A     Occupational History   • Not on file.     Social History Main Topics   • Smoking status: Current Some Day Smoker     Packs/day: 0.10   • Smokeless tobacco: Never Used   • Alcohol use Not on file   • Drug use: Unknown   • Sexual activity: Not on file     Other Topics Concern   • Not on file     Social History Narrative   • No narrative on file       ALLERGIES:  No Known Allergies    Review of systems:  A detailed review of symptoms was reviewed with patient. This is reviewed in H&P and PMH. ALL OTHERS reviewed and negative    Physical exam:  Patient Vitals for the past 24 hrs:   BP Temp Temp src Pulse Resp SpO2 Height Weight   12/21/18 1600 - - - 100 (!) 29 100 % - 87.5 kg (192 lb 14.4 oz)   12/21/18 1539 - - - - - 100 % - -   12/21/18 1500 - - - (!) 102 (!) 32 100 % - -   12/21/18 1445 - 37.9 °C (100.3 °F) Temporal 100 (!) 35 95 % - -   12/21/18 1300 (!) 95/66 37.3 °C (99.2 °F) Temporal (!) 107 (!) 37 92 % - -   12/21/18 1154 - - - (!) 101 (!) 30 91 % - -   12/21/18 0901 (!) 92/66 36.7 °C (98.1 °F) Temporal 100 (!) 22 91 % - -   12/21/18 0829 - - - 98 (!) 22 94 % - -   12/21/18 0335 (!) 94/68 - - (!) 106 (!) 30 96 % - -   12/21/18 0200 - - - - (!) 30 94 % - -   12/20/18  "2330 (!) 98/66 36.6 °C (97.8 °F) Temporal (!) 110 (!) 22 94 % 1.93 m (6' 4\") 82.1 kg (181 lb)   12/20/18 2300 - - - 64 (!) 30 93 % - -   12/20/18 2247 - - - (!) 104 - 91 % - -   12/20/18 2230 - - - (!) 104 18 90 % - -   12/20/18 2200 - - - (!) 105 18 91 % - -   12/20/18 2129 - - - (!) 104 18 91 % - -   12/20/18 2121 - - - (!) 107 18 88 % - -   12/20/18 2100 - - - 71 18 90 % - -   12/20/18 2030 - - - (!) 109 20 90 % - -   12/20/18 2000 - - - (!) 104 (!) 24 93 % - -   12/20/18 1945 - - - (!) 102 (!) 27 91 % - -   12/20/18 1930 - - - - (!) 30 - - -   12/20/18 1900 - - - 80 20 (!) 61 % - -   12/20/18 1845 - - - (!) 49 (!) 23 100 % - -   12/20/18 1815 - - - (!) 108 (!) 28 92 % - -   12/20/18 1800 - - - (!) 105 18 90 % - -   12/20/18 1745 - - - (!) 108 (!) 27 91 % - -   12/20/18 1730 - - - (!) 105 (!) 28 92 % - -   12/20/18 1715 - - - - 20 - - -       General: No acute distress.   EYES: no jaundice  HEENT: OP clear   Neck: No bruits No JVD.   CVS:   RRR. S1 + S2. No M/R/G  Resp: Bilateral crackles  Abdomen: Soft, NT, ND,  Skin: Grossly nothing acute no obvious rashes  Neurological: Alert, Moves all extremities, no cranial nerve defects on limited exam  Extremities: Pulse 2+ in b/l LE.  no edema. No cyanosis.       Data:  Laboratory studies:  Recent Results (from the past 24 hour(s))   Basic Metabolic Panel (BMP)    Collection Time: 12/21/18  4:05 AM   Result Value Ref Range    Sodium 139 135 - 145 mmol/L    Potassium 4.9 3.6 - 5.5 mmol/L    Chloride 104 96 - 112 mmol/L    Co2 29 20 - 33 mmol/L    Glucose 118 (H) 65 - 99 mg/dL    Bun 29 (H) 8 - 22 mg/dL    Creatinine 1.06 0.50 - 1.40 mg/dL    Calcium 8.3 (L) 8.5 - 10.5 mg/dL    Anion Gap 6.0 0.0 - 11.9   Lipid Profile (Lipid Panel) Fasting    Collection Time: 12/21/18  4:05 AM   Result Value Ref Range    Cholesterol,Tot 109 100 - 199 mg/dL    Triglycerides 46 0 - 149 mg/dL    HDL 27 (A) >=40 mg/dL    LDL 73 <100 mg/dL   Magnesium: Tomorrow AM    Collection Time: 12/21/18  " 4:05 AM   Result Value Ref Range    Magnesium 1.8 1.5 - 2.5 mg/dL   ABG - LAB    Collection Time: 12/21/18  4:05 AM   Result Value Ref Range    Ph 7.29 (LL) 7.40 - 7.50    Pco2 63.2 (HH) 26.0 - 37.0 mmHg    Po2 77.5 64.0 - 87.0 mmHg    O2 Saturation 93.1 93.0 - 99.0 %    Hco3 30 (H) 17 - 25 mmol/L    Base Excess 1 -4 - 3 mmol/L    Body Temp see below Centigrade   ESTIMATED GFR    Collection Time: 12/21/18  4:05 AM   Result Value Ref Range    GFR If African American >60 >60 mL/min/1.73 m 2    GFR If Non African American >60 >60 mL/min/1.73 m 2   ABG - LAB    Collection Time: 12/21/18  5:52 AM   Result Value Ref Range    Ph 7.28 (LL) 7.40 - 7.50    Pco2 68.3 (HH) 26.0 - 37.0 mmHg    Po2 70.7 64.0 - 87.0 mmHg    O2 Saturation 91.6 (L) 93.0 - 99.0 %    Hco3 31 (H) 17 - 25 mmol/L    Base Excess 2 -4 - 3 mmol/L    Body Temp see below Centigrade    O2 Therapy 5.0 2.0 - 10.0 L/min   ABG - LAB    Collection Time: 12/21/18  7:59 AM   Result Value Ref Range    Ph 7.34 (L) 7.40 - 7.50    Pco2 59.1 (HH) 26.0 - 37.0 mmHg    Po2 69.0 64.0 - 87.0 mmHg    O2 Saturation 92.8 (L) 93.0 - 99.0 %    Hco3 31 (H) 17 - 25 mmol/L    Base Excess 4 (H) -4 - 3 mmol/L    Body Temp 36.7 Centigrade    O2 Therapy 5.0 2.0 - 10.0 L/min    Ph -TC 7.35 (L) 7.40 - 7.50    Pco2 -TC 58.3 (HH) 26.0 - 37.0 mmHg    Po2 -TC 67.6 64.0 - 87.0 mmHg   TROPONIN    Collection Time: 12/21/18  8:54 AM   Result Value Ref Range    Troponin I 0.09 (H) 0.00 - 0.04 ng/mL   LACTIC ACID    Collection Time: 12/21/18  8:54 AM   Result Value Ref Range    Lactic Acid 2.0 0.5 - 2.0 mmol/L   OCCULT BLOOD STOOL    Collection Time: 12/21/18 11:32 AM   Result Value Ref Range    Occult Blood Feces Positive (A) Negative   EC-ECHOCARDIOGRAM COMPLETE W/O CONT    Collection Time: 12/21/18 12:03 PM   Result Value Ref Range    Eject.Frac. MOD BP 75.35     Eject.Frac. MOD 4C 84.31     Eject.Frac. MOD 2C 58.44     Left Ventrical Ejection Fraction 75    ARTERIAL BLOOD GAS    Collection Time:  12/21/18 12:24 PM   Result Value Ref Range    Ph 7.31 (L) 7.40 - 7.50    Pco2 60.3 (HH) 26.0 - 37.0 mmHg    Po2 68.4 64.0 - 87.0 mmHg    O2 Saturation 92.1 (L) 93.0 - 99.0 %    Hco3 30 (H) 17 - 25 mmol/L    Base Excess 2 -4 - 3 mmol/L    Body Temp see below Centigrade    O2 Therapy 5.0 2.0 - 10.0 L/min       Imaging:  CT-CTA CHEST PULMONARY ARTERY W/ RECONS   Final Result      1.  No central or segmental pulmonary embolus   2.  Emphysema   3.  Bibasilar atelectasis with superimposed pneumonia not excluded   4.  Small to moderate RIGHT pleural effusion   5.  Small LEFT pleural effusion   6.  Interstitial pulmonary edema or possibly chronic interstitial lung disease such as IPF/UIP.   7.  Ascites   8.  Findings suggestive of pulmonary arterial hypertension   9.  Small pericardial effusion         EC-ECHOCARDIOGRAM COMPLETE W/O CONT   Final Result      DX-CHEST-PORTABLE (1 VIEW)   Final Result         1. Cardiomegaly and bibasilar interstitial/alveolar opacities, right worse than left. They likely represent consolidations and/or edema. Multifocal pneumonia can be considered in the appropriate clinical settings.   2. Small right pleural effusion.        Echocardiogram 12/21/2018:  No prior study is available for comparison.   Left ventricle is small in size. Severe concentric left ventricular   hypertrophy. Hyperdynamic left ventricular systolic function. Left   ventricular ejection fraction is visually estimated to be greater than   75%. Abnormal septal motion consistent with right ventricular (RV)   volume overload and/or elevated RV end-diastolic pressure.   Indeterminate diastolic function. Near mid cavity obstruction.  Severely dilated right ventricle.  Right ventricular systolic pressure is estimated to be 80 mmHg.  Small pericardial effusion      EKG : personally reviewed by me    Sinus rhythm, left atrial abnormality, left posterior fascicular block nonspecific ST changes    All pertinent features of laboratory  and imaging reviewed including primary images where applicable    Assessment / Plan:   1.  Acute diastolic heart failure  2.  Severe left ventricular, right ventricular hypertrophy    Start Lasix 40 mg IV twice daily.  He has biventricular hypertrophy, trace effusion, severe pulmonary hypertension all consistent with restrictive cardiomyopathy.  We do not have capabilities to diagnose or treat infiltrative heart disease like amyloidosis (pyrophosphate scan, cardiac biopsy etc.).  Recommend referral to [facility] as an inpatient/outpatient for further workup and treatment.      It is my pleasure to participate in the care of Mr. Georges.  Please do not hesitate to contact me with questions or concerns.    Anderson Murillo    12/21/2018

## 2018-12-22 NOTE — DISCHARGE PLANNING
Transfer Center:     Received call from JOHANA Anthony [phone].  Reported that Dr. Murillo, Interventional Cardiology request transfer to [facility] for resources/services for Infiltrate cardiomyopathy.     Call placed to [facility] @ [phone].  Spoke with Dominique.  Initiate request for transfer for higher level of care.  Patient information provided.  She also asked for medical records to be fax for review.  Facesheet, H&P, and Labs faxed @ [phone].  Confirmation received.     Plan:  Pending call back from [facility] after reviewing patient's chart.

## 2018-12-22 NOTE — ASSESSMENT & PLAN NOTE
Severe with RVSP of 80 mmHg  CTA negative for pulmonary embolism  CT of the lungs with evidence of emphysema and ILD  Continue forced diuresis  No anticoagulation due to bleeding hemorrhoids  Will need right heart cath when medically improved  HIV negative  Needs evaluation for possible primary pulmonary hypertension

## 2018-12-22 NOTE — PROGRESS NOTES
12 hour cc    Monitor Summary:    Rhythm: SR/ST  Ectopy: occasional PVCs  Rate: 80s-100s  .14/.08/.32

## 2018-12-22 NOTE — CARE PLAN
Problem: Infection  Goal: Will remain free from infection  Outcome: PROGRESSING AS EXPECTED  Hand hygiene performed; hubs scrubbed prior to access.     Problem: Bowel/Gastric:  Goal: Normal bowel function is maintained or improved  Outcome: PROGRESSING SLOWER THAN EXPECTED  Pt continuing to have red/jelly-like stools.

## 2018-12-22 NOTE — PROGRESS NOTES
2 RN Skin assessment completed:   - All bony prominences intact and blanching  - No open wounds   - Penile growth noted, will discuss tomorrow with MD in daily rounds   - Devices in place: Bipap, woods cath, pulse ox. EKG leads, blood pressure cuff; skin assessed under each device  -Patient self turns in bed

## 2018-12-22 NOTE — PROGRESS NOTES
Transfer Center:    Received call from JOHANA Anthony [phone].  Reported that Dr. Murillo, Interventional Cardiology request transfer to [facility] for resources/services for Infiltrate cardiomyopathy.    Call placed to [facility] @ [phone].  Spoke with Dominique.  Initiate request for transfer for higher level of care.  Patient information provided.  She also asked for medical records to be fax for review.  Facesheet, H&P, and Labs faxed @ [phone].  Confirmation received.    Plan:  Pending call back from [facility] after reviewing patient's chart.

## 2018-12-22 NOTE — PROGRESS NOTES
Critical Care Progress Note    Date of admission  12/20/2018    Chief Complaint  58 y.o. male admitted 12/20/2018 with sob x 2 weeks, LE swelling x 4 weeks    Hospital Course    58-year-old  is a male who reports no   past medical history, smokes 3-5 cigarettes on a daily basis, presented to the   hospital on 12/20/2018 with complaints of 2 weeks of increasing dyspnea on   exertion, generalized fatigue, 4 weeks of increased lower extremity swelling   and abdominal distention.  Denies any fever, chills, sweats.  Denies any   active chest pain, no syncopal episodes, lightheadedness.  Denies any nausea,   vomiting, abdominal pain.  No diarrhea or dysuria.  Denies any recent travel.    Denies any history of clotting disorders.  Denies any stimulant utilization   or drug use. Patient was initially admitted to telemetry being worked up for   congestive heart failure given a BNP of greater than 1000 and hypoxia;   however, he continued to decline requiring BiPAP therapy and subsequently   transferred to the intensive care unit.      Interval Problem Update  Reviewed last 24 hour events:  Tm 99.5  -485cc over last 24hr  CTA chest no PE, diffuse emphysematous changes as well as interstitial changes concerning for ILD  Echo 12/22 w severe RV dilation, RVSP 80, hyperdynamic LV and LVH  Wbc 12  K 4.4  Mg 1.8  Cr 0.9    Bcx 12/20 ngtd    Bipap 14/8 and 50%  Last BM 12/21      Review of Systems  Review of Systems   Constitutional: Negative for chills and fever.   HENT: Negative for congestion.    Eyes: Negative for blurred vision.   Respiratory: Positive for cough, sputum production and shortness of breath. Negative for hemoptysis and wheezing.    Cardiovascular: Negative for chest pain and palpitations.   Gastrointestinal: Negative for abdominal pain, nausea and vomiting.   Neurological: Negative for focal weakness.   Psychiatric/Behavioral: Negative for depression.   All other systems reviewed and are negative.       Vital  Signs for last 24 hours   Temp:  [36.7 °C (98.1 °F)-37.9 °C (100.3 °F)] 37.3 °C (99.1 °F)  Pulse:  [] 100  Resp:  [14-40] 16  BP: (92-95)/(66) 95/66    Hemodynamic parameters for last 24 hours       Respiratory       Physical Exam   Physical Exam   Constitutional: He appears well-developed and well-nourished.   HENT:   Head: Normocephalic and atraumatic.   Eyes: No scleral icterus.   Neck: No tracheal deviation present.   Cardiovascular: Normal rate and intact distal pulses.    Pulmonary/Chest: He is in respiratory distress. He has no wheezes. He has rales.   Abdominal: He exhibits distension. There is no tenderness. There is no rebound and no guarding.   Musculoskeletal: He exhibits edema.   Neurological: No cranial nerve deficit.   Skin: Skin is warm and dry.   Psychiatric: He has a normal mood and affect.   Nursing note and vitals reviewed.      Medications  Current Facility-Administered Medications   Medication Dose Route Frequency Provider Last Rate Last Dose   • ipratropium-albuterol (DUONEB) nebulizer solution  3 mL Nebulization Q4H PRN (RT) Mp Al M.D.       • furosemide (LASIX) injection 40 mg  40 mg Intravenous BID DIURETIC Anderson Murillo M.D.   Stopped at 12/21/18 1730   • senna-docusate (PERICOLACE or SENOKOT S) 8.6-50 MG per tablet 2 Tab  2 Tab Oral BID Navid Grey D.O.   Stopped at 12/20/18 1800    And   • polyethylene glycol/lytes (MIRALAX) PACKET 1 Packet  1 Packet Oral QDAY PRN JOJO Enriquez.OSharif        And   • magnesium hydroxide (MILK OF MAGNESIA) suspension 30 mL  30 mL Oral QDAY PRN Navid Grey D.O.        And   • bisacodyl (DULCOLAX) suppository 10 mg  10 mg Rectal QDAY PRN ES EnriquezO.       • Respiratory Care per Protocol   Nebulization Continuous RT Navid Grey D.O.       • enoxaparin (LOVENOX) inj 40 mg  40 mg Subcutaneous DAILY Navid Grey D.O.   Stopped at 12/21/18 0600   • acetaminophen  (TYLENOL) tablet 650 mg  650 mg Oral Q6HRS PRN JOJO Enriquez.O.       • ondansetron (ZOFRAN) syringe/vial injection 4 mg  4 mg Intravenous Q4HRS PRN JOJO Enriquez.O.       • ondansetron (ZOFRAN ODT) dispertab 4 mg  4 mg Oral Q4HRS PRN JOJO Enriquez.O.       • promethazine (PHENERGAN) tablet 12.5-25 mg  12.5-25 mg Oral Q4HRS PRN JOJO Enriquez.O.       • promethazine (PHENERGAN) suppository 12.5-25 mg  12.5-25 mg Rectal Q4HRS PRN JOJO Enriquez.O.       • prochlorperazine (COMPAZINE) injection 5-10 mg  5-10 mg Intravenous Q4HRS PRN JOJO Enriquez.O.           Fluids    Intake/Output Summary (Last 24 hours) at 12/22/18 0707  Last data filed at 12/22/18 0600   Gross per 24 hour   Intake              560 ml   Output             1165 ml   Net             -605 ml       Laboratory  Recent Labs      12/21/18   0552  12/21/18   0759  12/21/18   1224   [id number]  7.28*  7.34*  7.31*   [id number]  68.3*  59.1*  60.3*   [id number]  70.7  69.0  68.4   [id number]  91.6*  92.8*  92.1*   ARTHCO3  31*  31*  30*   [id number]  5.0  5.0  5.0   ARTBE  2  4*  2     Recent Labs      12/20/18   1045  12/21/18   0854  12/21/18   1800   CPKTOTAL   --    --   52   TROPONINI  0.15*  0.09*   --    BNPBTYPENAT  1146*   --    --      Recent Labs      12/20/18   1045  12/21/18   0405   SODIUM  139  139   POTASSIUM  4.1  4.9   CHLORIDE  103  104   CO2  30  29   BUN  22  29*   CREATININE  1.02  1.06   MAGNESIUM  1.9  1.8   CALCIUM  9.0  8.3*     Recent Labs      12/20/18   1045  12/21/18   0405   ALTSGPT  21   --    ASTSGOT  26   --    ALKPHOSPHAT  81   --    TBILIRUBIN  0.8   --    GLUCOSE  100*  118*     Recent Labs      12/20/18   1045  12/22/18   0440   WBC  13.7*  12.6*   NEUTSPOLYS  85.70*  86.60*   LYMPHOCYTES  9.00*  8.00*   MONOCYTES  4.60  4.70   EOSINOPHILS  0.10  0.10   BASOPHILS  0.20  0.20   ASTSGOT  26   --    ALTSGPT  21   --    ALKPHOSPHAT  81   --    TBILIRUBIN  0.8    --      Recent Labs      12/20/18   1045  12/22/18   0440   RBC  5.34  4.75   HEMOGLOBIN  16.4  15.0   HEMATOCRIT  47.0  41.5*   PLATELETCT  330  266       Imaging  CT:    Reviewed    Assessment/Plan  GI bleed   Assessment & Plan    Hb stable  Hold lovenox  Appears to be lower - bright red  Will need further evaluation once more stable     Pulmonary hypertension (HCC)   Assessment & Plan    Severe with RVSP 80  Cta (-) for PE, 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 Related to Lung Dz vs pah vs primary cardiac  If primary lung then vasodilators are not indicated  Continue gentle diureses for now, keep sats > 92%     Acute respiratory failure with hypoxia and hypercapnia (HCC)   Assessment & Plan    Multifactorial with cardiac/pulmonary involvement  Cta without PE, did note diffuse emphysematous changes as well as peripheral subpleural cystic changes, ? ILD  Echo with biventricular hypertrophy - cards concerned of infiltrative cardiac dz (i.e. Amyloid)   Severe Pulm Htn w RVSP 80 (? Related to ILD/Lung Dz +/- cardiac)  Rt/02 protocols  Continue bipap at this time with active titration  Cautious diureses with lasix 20 bid - follow UO and renal function  May benefit from HRCT when more stable  HIV/RF/CPK (-), pending FAUSTINA, ANCA, Hypersensitivity panel        CHF (congestive heart failure) (HCC)   Assessment & Plan    Echo reviewed with severe hypertrophic LVH, severe RV dysfunction/dilation with RVSP 80  Cardiology concerned for possible infiltrative dz with recs to transfer pt to facility with capacity to w/u (imaging/bx)  Gentle diureses at this time   Given labile pressures will hold off anti hypertension meds          VTE:  Contraindicated  Ulcer: Not Indicated  Lines: None    I have performed a physical exam and reviewed and updated ROS and Plan today (12/22/2018). In review of yesterday's note (12/21/2018), there are no changes except as documented above.     Discussed patient condition and risk of  morbidity and/or mortality with Hospitalist, RN, RT, Therapies, Pharmacy and Patient  The patient remains critically ill.  Critical care time = 32 minutes in directly providing and coordinating critical care and extensive data review.  No time overlap and excludes procedures.

## 2018-12-22 NOTE — PROGRESS NOTES
· 2 RN skin check complete.  · Devices in place: BiPap, pulse ox, EKG monitoring, BP cuff, woods catheter.  · Skin assessed under devices.  · The following interventions in place: pt is self-repositioning, bony prominences elevated on pillows.  · Penile growth noted.

## 2018-12-22 NOTE — DISCHARGE PLANNING
Anticipated Discharge Disposition: acute trasnfer    Action: Notified by APN pt needs to be transferred to [place] for higher level of care. Called transfer center to notify of request.   Met with pt at bedside to complete assessment. Pt confirmed information listed on facesheet. He states he lives with his wife and was independent with all ADL's and IADL's PTA. He doesn't have a PCP. Pt confirmed he's currently uninsured. He states he no longer works for [employer] due to his condition. Pt unsure what his income was in December. Pt agreeable to Medicaid screening.   Lack of insurance may be a barrier to transfer    Barriers to Discharge: Uninsured     Plan: F/U with Transfer Center. LSW emailed PFA to request Medicaid screening    Care Transition Team Assessment    Information Source  Orientation : Oriented x 4  Information Given By: Patient  Informant's Name: Alexis         Elopement Risk  Legal Hold: No  Ambulatory or Self Mobile in Wheelchair: No-Not an Elopement Risk  Disoriented: No  Psychiatric Symptoms: None  History of Wandering: No  Elopement this Admit: No  Vocalizing Wanting to Leave: No  Displays Behaviors, Body Language Wanting to Leave: No-Not at Risk for Elopement  Elopement Risk: Not at Risk for Elopement    Interdisciplinary Discharge Planning  Does Admitting Nurse Feel This Could be a Complex Discharge?: No  Primary Care Physician: None- per patient  Lives with - Patient's Self Care Capacity: Spouse  Patient or legal guardian wants to designate a caregiver (see row info): No  Support Systems: Spouse / Significant Other  Housing / Facility: 1 Story Apartment / Condo  Do You Take your Prescribed Medications Regularly: No  Able to Return to Previous ADL's: Yes  Mobility Issues: No  Prior Services: None  Assistance Needed: No  Durable Medical Equipment: Not Applicable    Discharge Preparedness  What is your plan after discharge?: Uncertain - pending medical team collaboration  What are your  discharge supports?: Spouse  Prior Functional Level: Ambulatory, Independent with Activities of Daily Living, Independent with Medication Management  Difficulity with ADLs: None  Difficulity with IADLs: None    Functional Assesment  Prior Functional Level: Ambulatory, Independent with Activities of Daily Living, Independent with Medication Management    Finances  Financial Barriers to Discharge: Yes  Source of Income: None  Prescription Coverage: No    Vision / Hearing Impairment  Vision Impairment : No  Hearing Impairment : No    Values / Beliefs / Concerns  Values / Beliefs Concerns : No         Domestic Abuse  Have you ever been the victim of abuse or violence?: No  Physical Abuse or Sexual Abuse: No  Verbal Abuse or Emotional Abuse: No  Possible Abuse Reported to:: Not Applicable    Psychological Assessment  History of Substance Abuse: None  History of Psychiatric Problems: No  Non-compliant with Treatment: No  Newly Diagnosed Illness: Yes    Discharge Risks or Barriers  Discharge risks or barriers?: No PCP, Uninsured / underinsured    Anticipated Discharge Information  Anticipated discharge disposition: Discharge needs currently unknown

## 2018-12-22 NOTE — ASSESSMENT & PLAN NOTE
Echocardiogram with right ventricular volume overload and reduced right ventricular systolic function  Continue to force diuresis with furosemide  I will give a dose of Diamox for metabolic alkalosis today  Left ventricle with normal systolic function and impaired diastolic function due to right ventricular dilatation and volume overload

## 2018-12-23 LAB
ALBUMIN SERPL BCP-MCNC: 2.5 G/DL (ref 3.2–4.9)
ALBUMIN/GLOB SERPL: 1 G/DL
ALP SERPL-CCNC: 66 U/L (ref 30–99)
ALT SERPL-CCNC: 14 U/L (ref 2–50)
ANION GAP SERPL CALC-SCNC: 3 MMOL/L (ref 0–11.9)
AST SERPL-CCNC: 13 U/L (ref 12–45)
BASOPHILS # BLD AUTO: 0.2 % (ref 0–1.8)
BASOPHILS # BLD AUTO: 0.5 % (ref 0–1.8)
BASOPHILS # BLD: 0.03 K/UL (ref 0–0.12)
BASOPHILS # BLD: 0.07 K/UL (ref 0–0.12)
BILIRUB SERPL-MCNC: 0.9 MG/DL (ref 0.1–1.5)
BUN SERPL-MCNC: 25 MG/DL (ref 8–22)
CALCIUM SERPL-MCNC: 8.5 MG/DL (ref 8.5–10.5)
CHLORIDE SERPL-SCNC: 103 MMOL/L (ref 96–112)
CO2 SERPL-SCNC: 34 MMOL/L (ref 20–33)
CREAT SERPL-MCNC: 0.87 MG/DL (ref 0.5–1.4)
EOSINOPHIL # BLD AUTO: 0.05 K/UL (ref 0–0.51)
EOSINOPHIL # BLD AUTO: 0.11 K/UL (ref 0–0.51)
EOSINOPHIL NFR BLD: 0.4 % (ref 0–6.9)
EOSINOPHIL NFR BLD: 0.7 % (ref 0–6.9)
ERYTHROCYTE [DISTWIDTH] IN BLOOD BY AUTOMATED COUNT: 40.1 FL (ref 35.9–50)
ERYTHROCYTE [DISTWIDTH] IN BLOOD BY AUTOMATED COUNT: 41.1 FL (ref 35.9–50)
GLOBULIN SER CALC-MCNC: 2.4 G/DL (ref 1.9–3.5)
GLUCOSE SERPL-MCNC: 109 MG/DL (ref 65–99)
HCT VFR BLD AUTO: 40.6 % (ref 42–52)
HCT VFR BLD AUTO: 44.7 % (ref 42–52)
HGB BLD-MCNC: 14.5 G/DL (ref 14–18)
HGB BLD-MCNC: 15.4 G/DL (ref 14–18)
IMM GRANULOCYTES # BLD AUTO: 0.03 K/UL (ref 0–0.11)
IMM GRANULOCYTES # BLD AUTO: 0.05 K/UL (ref 0–0.11)
IMM GRANULOCYTES NFR BLD AUTO: 0.2 % (ref 0–0.9)
IMM GRANULOCYTES NFR BLD AUTO: 0.3 % (ref 0–0.9)
LYMPHOCYTES # BLD AUTO: 0.98 K/UL (ref 1–4.8)
LYMPHOCYTES # BLD AUTO: 1.21 K/UL (ref 1–4.8)
LYMPHOCYTES NFR BLD: 7.6 % (ref 22–41)
LYMPHOCYTES NFR BLD: 8.1 % (ref 22–41)
MAGNESIUM SERPL-MCNC: 1.9 MG/DL (ref 1.5–2.5)
MCH RBC QN AUTO: 30.6 PG (ref 27–33)
MCH RBC QN AUTO: 31.3 PG (ref 27–33)
MCHC RBC AUTO-ENTMCNC: 34.5 G/DL (ref 33.7–35.3)
MCHC RBC AUTO-ENTMCNC: 35.7 G/DL (ref 33.7–35.3)
MCV RBC AUTO: 87.5 FL (ref 81.4–97.8)
MCV RBC AUTO: 88.9 FL (ref 81.4–97.8)
MONOCYTES # BLD AUTO: 0.74 K/UL (ref 0–0.85)
MONOCYTES # BLD AUTO: 0.86 K/UL (ref 0–0.85)
MONOCYTES NFR BLD AUTO: 5.8 % (ref 0–13.4)
MONOCYTES NFR BLD AUTO: 5.8 % (ref 0–13.4)
NEUTROPHILS # BLD AUTO: 11.03 K/UL (ref 1.82–7.42)
NEUTROPHILS # BLD AUTO: 12.6 K/UL (ref 1.82–7.42)
NEUTROPHILS NFR BLD: 84.6 % (ref 44–72)
NEUTROPHILS NFR BLD: 85.8 % (ref 44–72)
NRBC # BLD AUTO: 0 K/UL
NRBC # BLD AUTO: 0 K/UL
NRBC BLD-RTO: 0 /100 WBC
NRBC BLD-RTO: 0 /100 WBC
PLATELET # BLD AUTO: 240 K/UL (ref 164–446)
PLATELET # BLD AUTO: 275 K/UL (ref 164–446)
PMV BLD AUTO: 8.9 FL (ref 9–12.9)
PMV BLD AUTO: 9.2 FL (ref 9–12.9)
POTASSIUM SERPL-SCNC: 4 MMOL/L (ref 3.6–5.5)
PROT SERPL-MCNC: 4.9 G/DL (ref 6–8.2)
RBC # BLD AUTO: 4.64 M/UL (ref 4.7–6.1)
RBC # BLD AUTO: 5.03 M/UL (ref 4.7–6.1)
SODIUM SERPL-SCNC: 140 MMOL/L (ref 135–145)
WBC # BLD AUTO: 12.9 K/UL (ref 4.8–10.8)
WBC # BLD AUTO: 14.9 K/UL (ref 4.8–10.8)

## 2018-12-23 PROCEDURE — 80053 COMPREHEN METABOLIC PANEL: CPT

## 2018-12-23 PROCEDURE — 83735 ASSAY OF MAGNESIUM: CPT

## 2018-12-23 PROCEDURE — 85025 COMPLETE CBC W/AUTO DIFF WBC: CPT | Mod: 91

## 2018-12-23 PROCEDURE — 94003 VENT MGMT INPAT SUBQ DAY: CPT

## 2018-12-23 PROCEDURE — 99233 SBSQ HOSP IP/OBS HIGH 50: CPT | Performed by: INTERNAL MEDICINE

## 2018-12-23 PROCEDURE — 770022 HCHG ROOM/CARE - ICU (200)

## 2018-12-23 PROCEDURE — 36600 WITHDRAWAL OF ARTERIAL BLOOD: CPT

## 2018-12-23 PROCEDURE — 700111 HCHG RX REV CODE 636 W/ 250 OVERRIDE (IP): Performed by: INTERNAL MEDICINE

## 2018-12-23 PROCEDURE — 99233 SBSQ HOSP IP/OBS HIGH 50: CPT | Performed by: HOSPITALIST

## 2018-12-23 PROCEDURE — 94668 MNPJ CHEST WALL SBSQ: CPT

## 2018-12-23 PROCEDURE — 99291 CRITICAL CARE FIRST HOUR: CPT | Performed by: INTERNAL MEDICINE

## 2018-12-23 PROCEDURE — 94669 MECHANICAL CHEST WALL OSCILL: CPT

## 2018-12-23 RX ORDER — FUROSEMIDE 10 MG/ML
20 INJECTION INTRAMUSCULAR; INTRAVENOUS ONCE
Status: COMPLETED | OUTPATIENT
Start: 2018-12-23 | End: 2018-12-23

## 2018-12-23 RX ORDER — MAGNESIUM SULFATE HEPTAHYDRATE 40 MG/ML
2 INJECTION, SOLUTION INTRAVENOUS ONCE
Status: COMPLETED | OUTPATIENT
Start: 2018-12-23 | End: 2018-12-23

## 2018-12-23 RX ORDER — FUROSEMIDE 10 MG/ML
40 INJECTION INTRAMUSCULAR; INTRAVENOUS
Status: DISCONTINUED | OUTPATIENT
Start: 2018-12-23 | End: 2018-12-24

## 2018-12-23 RX ADMIN — FUROSEMIDE 20 MG: 10 INJECTION, SOLUTION INTRAMUSCULAR; INTRAVENOUS at 05:29

## 2018-12-23 RX ADMIN — FUROSEMIDE 40 MG: 10 INJECTION, SOLUTION INTRAMUSCULAR; INTRAVENOUS at 16:21

## 2018-12-23 RX ADMIN — FUROSEMIDE 20 MG: 10 INJECTION, SOLUTION INTRAMUSCULAR; INTRAVENOUS at 09:30

## 2018-12-23 RX ADMIN — MAGNESIUM SULFATE IN WATER 2 G: 40 INJECTION, SOLUTION INTRAVENOUS at 09:45

## 2018-12-23 ASSESSMENT — ENCOUNTER SYMPTOMS
ABDOMINAL PAIN: 0
BACK PAIN: 0
SPEECH CHANGE: 0
BLURRED VISION: 0
SHORTNESS OF BREATH: 1
NERVOUS/ANXIOUS: 0
BLOOD IN STOOL: 1
WHEEZING: 0
SPUTUM PRODUCTION: 0
COUGH: 1
FOCAL WEAKNESS: 0
NAUSEA: 0
PALPITATIONS: 0
DEPRESSION: 0
SORE THROAT: 0
HEADACHES: 0
CHILLS: 0
HEMOPTYSIS: 0
VOMITING: 0
NECK PAIN: 0
FEVER: 0
SPUTUM PRODUCTION: 1
CONSTIPATION: 0

## 2018-12-23 ASSESSMENT — PAIN SCALES - GENERAL
PAINLEVEL_OUTOF10: 0

## 2018-12-23 ASSESSMENT — PULMONARY FUNCTION TESTS: EPAP_CMH2O: 7

## 2018-12-23 NOTE — CARE PLAN
Problem: Venous Thromboembolism (VTW)/Deep Vein Thrombosis (DVT) Prevention:  Goal: Patient will participate in Venous Thrombosis (VTE)/Deep Vein Thrombosis (DVT)Prevention Measures    Intervention: Assess and monitor for anticoagulation complications  Lovenox discontinued for bloody stool.       Problem: Respiratory:  Goal: Respiratory status will improve  Outcome: PROGRESSING AS EXPECTED    Intervention: Administer and titrate oxygen therapy  Pt wore Bipap for noc shift. Pt has been on 5LNC, bipap after mobilization.

## 2018-12-23 NOTE — PROGRESS NOTES
Hospital Medicine Daily Progress Note    Date of Service  12/23/2018    Chief Complaint  SHort of breath    Hospital Course    58 y.o. male admitted 12/20/2018 with shortness of breath and increasing leg swelling over the prior 4 weeks.  He required BiPAP therapy in ICU       Interval Problem Update  Standby assist  BM brown & loose no blood per report but on my evaluation he was having some seepage of red mucous like drainage on his bedsheet.   On 5L NC  Replace Mg  Lasix 40mg    Consultants/Specialty  Pulmonary  Cardiology    Code Status  FULL    Disposition  Monitor in ICU    Review of Systems  Review of Systems   Constitutional: Negative for chills and fever.   HENT: Negative for congestion and sore throat.    Respiratory: Positive for cough and shortness of breath. Negative for sputum production.    Cardiovascular: Positive for leg swelling. Negative for chest pain and palpitations.   Gastrointestinal: Positive for blood in stool. Negative for abdominal pain, constipation, melena, nausea and vomiting.   Genitourinary: Negative for dysuria and hematuria.   Musculoskeletal: Negative for back pain and neck pain.   Neurological: Negative for speech change and headaches.   Psychiatric/Behavioral: Negative for depression. The patient is not nervous/anxious.         Physical Exam  Temp:  [36.3 °C (97.4 °F)-36.9 °C (98.5 °F)] 36.7 °C (98.1 °F)  Pulse:  [] 103  Resp:  [11-35] 20    Physical Exam   Constitutional: He is oriented to person, place, and time. He appears well-developed and well-nourished. No distress.   HENT:   Head: Normocephalic and atraumatic.   Nose: Nose normal.   Mouth/Throat: Oropharynx is clear and moist.   Eyes: Conjunctivae and EOM are normal. Right eye exhibits no discharge. Left eye exhibits no discharge. No scleral icterus.   Neck: No tracheal deviation present.   Cardiovascular: Normal rate, regular rhythm, normal heart sounds and intact distal pulses.    No murmur  heard.  Pulmonary/Chest: Effort normal. No stridor. No respiratory distress. He has no wheezes. He has rales.   Abdominal: Soft. Bowel sounds are normal. He exhibits no distension. There is no tenderness.   Genitourinary:   Genitourinary Comments: External rectum w/o hemorrhoids. No wounds noted.   Musculoskeletal: He exhibits edema (decreased).   Lymphadenopathy:     He has no cervical adenopathy.   Neurological: He is alert and oriented to person, place, and time. No cranial nerve deficit.   Skin: Skin is warm. He is not diaphoretic.   Psychiatric: He has a normal mood and affect. His behavior is normal. Thought content normal.   Vitals reviewed.      Fluids    Intake/Output Summary (Last 24 hours) at 12/23/18 2013  Last data filed at 12/23/18 1800   Gross per 24 hour   Intake             1850 ml   Output             4050 ml   Net            -2200 ml       Laboratory  Recent Labs      12/22/18   0440  12/23/18   0520  12/23/18   1740   WBC  12.6*  12.9*  14.9*   RBC  4.75  4.64*  5.03   HEMOGLOBIN  15.0  14.5  15.4   HEMATOCRIT  41.5*  40.6*  44.7   MCV  87.4  87.5  88.9   MCH  31.6  31.3  30.6   MCHC  36.1*  35.7*  34.5   RDW  40.0  40.1  41.1   PLATELETCT  266  240  275   MPV  9.1  9.2  8.9*     Recent Labs      12/21/18   0405  12/22/18   0440  12/23/18   0520   SODIUM  139  139  140   POTASSIUM  4.9  4.4  4.0   CHLORIDE  104  105  103   CO2  29  32  34*   GLUCOSE  118*  97  109*   BUN  29*  29*  25*   CREATININE  1.06  0.90  0.87   CALCIUM  8.3*  8.7  8.5     Recent Labs      12/22/18   0940   INR  1.10         Recent Labs      12/21/18   0405   TRIGLYCERIDE  46   HDL  27*   LDL  73       Imaging  CT-CTA CHEST PULMONARY ARTERY W/ RECONS   Final Result      1.  No central or segmental pulmonary embolus   2.  Emphysema   3.  Bibasilar atelectasis with superimposed pneumonia not excluded   4.  Small to moderate RIGHT pleural effusion   5.  Small LEFT pleural effusion   6.  Interstitial pulmonary edema or possibly  chronic interstitial lung disease such as IPF/UIP.   7.  Ascites   8.  Findings suggestive of pulmonary arterial hypertension   9.  Small pericardial effusion         EC-ECHOCARDIOGRAM COMPLETE W/O CONT   Final Result      DX-CHEST-PORTABLE (1 VIEW)   Final Result         1. Cardiomegaly and bibasilar interstitial/alveolar opacities, right worse than left. They likely represent consolidations and/or edema. Multifocal pneumonia can be considered in the appropriate clinical settings.   2. Small right pleural effusion.           Assessment/Plan  * Acute respiratory failure with hypoxia and hypercapnia (HCC)   Assessment & Plan    Concern of cardiomyopathy with restriction from [facility]  Cardiology consulting and discussion with [place] for possible transfer for further work up.  Diuresing and monitor strict I/O's  Monitor vitals, labs  On BiPAP just overnight as of 12/22  On nasal canula 12/23 am.  Rt protocol  Pulmonary consulting     GI bleed   Assessment & Plan    Bloody mucus-like per RN  No sign of hemorrhoid on external exam 12/23.  Monitor Hgb  12/21 Hgb:12.6  12/22 Hgb:13.7  12/23 Hgb: 14.5 appears to be heme concentrating with diuresis  INR 1.1     Pulmonary hypertension (HCC)   Assessment & Plan    RVSP: 80mmHg  CTA negative for PE  Supplemental oxygen  Cardiology consulting     CHF (congestive heart failure) ([facility])   Assessment & Plan    Cardiology trying to transfer to [place].  Diuresis, strict I/O's, monitor labs  12/21 Echo: Left ventricle is small in size. Severe concentric left ventricular   hypertrophy. Hyperdynamic left ventricular systolic function. Left   ventricular ejection fraction is visually estimated to be greater than   75%. Abnormal septal motion consistent with right ventricular (RV)   volume overload and/or elevated RV end-diastolic pressure.   Indeterminate diastolic function. Near mid cavity obstruction.  Severely dilated right ventricle.  Right ventricular systolic pressure is  estimated to be 80 mmHg.     Elevated troponin   Assessment & Plan    C/O CHF as etiology  monitor     Hypercapnia   Assessment & Plan    On BiPAP last night  On nasal canula 12/23am  Monitor abg  Pulmonary consulting          VTE prophylaxis: SCD

## 2018-12-23 NOTE — CARE PLAN
Problem: Bowel/Gastric:  Goal: Will not experience complications related to bowel motility    Intervention: Assess baseline bowel pattern  Pt incontinent.  Анна care provided. Stool softeners to be held. Linens changed.

## 2018-12-23 NOTE — CARE PLAN
Problem: Bowel/Gastric:  Goal: Will not experience complications related to bowel motility    Intervention: Assess baseline bowel pattern  Arcadio blood noted to mucous bm. Notified MD Bailey.  Continue to monitor.  BP stable.  Pt denies dizziness. Hgb 14.5

## 2018-12-23 NOTE — PROGRESS NOTES
12 HR chart check, 2 RN skin assessment, and monitor review complete.     Monitor Review:    TOI:     .16/.08/.32

## 2018-12-23 NOTE — DISCHARGE PLANNING
LYNDON CM confirmed with the [facility] that they had received the faxed patient information.

## 2018-12-23 NOTE — ASSESSMENT & PLAN NOTE
Concern of cardiomyopathy with restriction from hypertrophy  [place] decline acceptance  Diuresing and monitor strict I/O's  Monitor vitals, labs  On BiPAP just overnight as of 12/22  On nasal canula 12/23 am.  Rt protocol  Pulmonary consulting  Moderate pulmonary hypertension with negative reactivity test on angiography  Weaned off oxygen.  Switching to p.o. Lasix.  Might need to reevaluate tomorrow

## 2018-12-23 NOTE — CARE PLAN
Problem: Hyperinflation:  Goal: Prevent or improve atelectasis  PEP QID    Problem: Ventilation Defect:  Goal: Ability to achieve and maintain unassisted ventilation or tolerate decreased levels of ventilator support    Intervention: Support and monitor invasive and noninvasive mechanical ventilation  Adult Noninvasive Ventilation    BiPap Day 2  IPAP (cmH2O): 12 (12/23/18 0103)  EPAP (cm H2O): 7 (12/23/18 0103)  FiO2: 50 (12/23/18 0103)

## 2018-12-23 NOTE — ASSESSMENT & PLAN NOTE
RVSP: 80mmHg  CTA negative for PE  Supplemental oxygen  Cardiology consulted  Moderate pulmonary hypertension with negative vasoreactivity

## 2018-12-23 NOTE — PROGRESS NOTES
Critical Care Progress Note    Date of admission  12/20/2018    Chief Complaint  58 y.o. male admitted 12/20/2018 with sob x 2 weeks, LE swelling x 4 weeks    Hospital Course    58-year-old  is a male who reports no   past medical history, smokes 3-5 cigarettes on a daily basis, presented to the   hospital on 12/20/2018 with complaints of 2 weeks of increasing dyspnea on   exertion, generalized fatigue, 4 weeks of increased lower extremity swelling   and abdominal distention.  Denies any fever, chills, sweats.  Denies any   active chest pain, no syncopal episodes, lightheadedness.  Denies any nausea,   vomiting, abdominal pain.  No diarrhea or dysuria.  Denies any recent travel.    Denies any history of clotting disorders.  Denies any stimulant utilization   or drug use. Patient was initially admitted to telemetry being worked up for   congestive heart failure given a BNP of greater than 1000 and hypoxia;   however, he continued to decline requiring BiPAP therapy and subsequently   transferred to the intensive care unit.      Interval Problem Update  Reviewed last 24 hour events:  Tm 99.5  -380cc over last 24hr, -565cc since admit  CTA chest no PE, diffuse emphysematous changes as well as interstitial changes concerning for ILD  Echo 12/22 w severe RV dilation, RVSP 80, hyperdynamic LV and LVH  Wbc 12  K 4.0  Mg 1.9  Cr 0.87    Bcx 12/20 ngtd    Bipap 14/8 and 50%  Last BM 12/21      Review of Systems  Review of Systems   Constitutional: Negative for chills and fever.   HENT: Negative for congestion.    Eyes: Negative for blurred vision.   Respiratory: Positive for cough, sputum production and shortness of breath. Negative for hemoptysis and wheezing.    Cardiovascular: Negative for chest pain and palpitations.   Gastrointestinal: Negative for abdominal pain, nausea and vomiting.   Neurological: Negative for focal weakness.   Psychiatric/Behavioral: Negative for depression.   All other systems reviewed and are  negative.       Vital Signs for last 24 hours   Temp:  [36.3 °C (97.4 °F)-37.5 °C (99.5 °F)] 36.3 °C (97.4 °F)  Pulse:  [] 98  Resp:  [3-37] 26    Hemodynamic parameters for last 24 hours       Respiratory       Physical Exam   Physical Exam   Constitutional: He appears well-developed and well-nourished.   HENT:   Head: Normocephalic and atraumatic.   Eyes: No scleral icterus.   Neck: No tracheal deviation present.   Cardiovascular: Normal rate and intact distal pulses.    Pulmonary/Chest: He is in respiratory distress. He has no wheezes. He has rales.   Abdominal: He exhibits distension. There is no tenderness. There is no rebound and no guarding.   Musculoskeletal: He exhibits edema.   Neurological: No cranial nerve deficit.   Skin: Skin is warm and dry.   Psychiatric: He has a normal mood and affect.   Nursing note and vitals reviewed.      Medications  Current Facility-Administered Medications   Medication Dose Route Frequency Provider Last Rate Last Dose   • furosemide (LASIX) injection 20 mg  20 mg Intravenous BID DIURETIC Mp Al M.D.   20 mg at 12/23/18 0529   • ipratropium-albuterol (DUONEB) nebulizer solution  3 mL Nebulization Q4H PRN (RT) Mp Al M.D.       • senna-docusate (PERICOLACE or SENOKOT S) 8.6-50 MG per tablet 2 Tab  2 Tab Oral BID Navid Grey D.O.   Stopped at 12/20/18 1800    And   • polyethylene glycol/lytes (MIRALAX) PACKET 1 Packet  1 Packet Oral QDAY PRN Navid Grey D.O.        And   • magnesium hydroxide (MILK OF MAGNESIA) suspension 30 mL  30 mL Oral QDAY PRN JOJO Enriquez.O.        And   • bisacodyl (DULCOLAX) suppository 10 mg  10 mg Rectal QDAY PRN Navid Grey D.O.       • Respiratory Care per Protocol   Nebulization Continuous RT JOJO Enriquez.O.       • acetaminophen (TYLENOL) tablet 650 mg  650 mg Oral Q6HRS PRN JOJO Enriquez.O.       • ondansetron (ZOFRAN) syringe/vial injection 4 mg   4 mg Intravenous Q4HRS PRN JOJO Enriquez.O.       • ondansetron (ZOFRAN ODT) dispertab 4 mg  4 mg Oral Q4HRS PRN JOJO Enriquez.O.       • promethazine (PHENERGAN) tablet 12.5-25 mg  12.5-25 mg Oral Q4HRS PRN JOJO Enriquez.O.       • promethazine (PHENERGAN) suppository 12.5-25 mg  12.5-25 mg Rectal Q4HRS PRN JOJO Enriquez.O.       • prochlorperazine (COMPAZINE) injection 5-10 mg  5-10 mg Intravenous Q4HRS PRN JOJO Enriquez.O.           Fluids    Intake/Output Summary (Last 24 hours) at 12/23/18 0803  Last data filed at 12/23/18 0500   Gross per 24 hour   Intake             1810 ml   Output             2400 ml   Net             -590 ml       Laboratory  Recent Labs      12/21/18   0552  12/21/18   0759  12/21/18   1224   ATYBJ41X  7.28*  7.34*  7.31*   BNYPLF524L  68.3*  59.1*  60.3*   VZHFE915N  70.7  69.0  68.4   AZVL6BFB  91.6*  92.8*  92.1*   ARTHCO3  31*  31*  30*   J0PUSBSRJ  5.0  5.0  5.0   ARTBE  2  4*  2     Recent Labs      12/20/18   1045  12/21/18   0854  12/21/18   1800   CPKTOTAL   --    --   52   TROPONINI  0.15*  0.09*   --    BNPBTYPENAT  1146*   --    --      Recent Labs      12/21/18   0405  12/22/18   0440  12/23/18   0520   SODIUM  139  139  140   POTASSIUM  4.9  4.4  4.0   CHLORIDE  104  105  103   CO2  29  32  34*   BUN  29*  29*  25*   CREATININE  1.06  0.90  0.87   MAGNESIUM  1.8  1.8  1.9   CALCIUM  8.3*  8.7  8.5     Recent Labs      12/20/18   1045  12/21/18   0405  12/22/18   0440  12/23/18   0520   ALTSGPT  21   --   16  14   ASTSGOT  26   --   14  13   ALKPHOSPHAT  81   --   64  66   TBILIRUBIN  0.8   --   0.7  0.9   GLUCOSE  100*  118*  97  109*     Recent Labs      12/20/18   1045  12/22/18   0440  12/23/18   0520   WBC  13.7*  12.6*  12.9*   NEUTSPOLYS  85.70*  86.60*  85.80*   LYMPHOCYTES  9.00*  8.00*  7.60*   MONOCYTES  4.60  4.70  5.80   EOSINOPHILS  0.10  0.10  0.40   BASOPHILS  0.20  0.20  0.20   ASTSGOT  26  14  13    ALTSGPT  21  16  14   ALKPHOSPHAT  81  64  66   TBILIRUBIN  0.8  0.7  0.9     Recent Labs      12/20/18   1045  12/22/18   0440  12/22/18   0940  12/23/18   0520   RBC  5.34  4.75   --   4.64*   HEMOGLOBIN  16.4  15.0   --   14.5   HEMATOCRIT  47.0  41.5*   --   40.6*   PLATELETCT  330  266   --   240   PROTHROMBTM   --    --   14.3   --    INR   --    --   1.10   --        Imaging  CT:    Reviewed    Assessment/Plan  * Acute respiratory failure with hypoxia and hypercapnia (HCC)   Assessment & Plan    Multifactorial with cardiac/pulmonary involvement  Cta without PE, did note diffuse emphysematous changes as well as peripheral subpleural cystic changes, ? ILD  Echo with biventricular hypertrophy - cards concerned of infiltrative cardiac dz (i.e. Amyloid)   Severe Pulm Htn w RVSP 80 (? 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 Related to Lung Dz vs pah vs primary cardiac  If primary lung then vasodilators are not indicated - can worsen shunting  Continue gentle diureses for now, keep sats > 92%     CHF (congestive heart failure) (HCC)   Assessment & Plan    Echo reviewed with severe hypertrophic LVH, severe RV dysfunction/dilation with RVSP 80  Cardiology concerned for possible infiltrative dz with recs to transfer pt to facility with capacity to w/u (imaging/bx)  Gentle diureses at this time - increase lasix to 40 bid - consider continuous infusion   Prn diamox  Given labile pressures will hold off anti hypertension meds          VTE:  Contraindicated  Ulcer: Not  Indicated  Lines: None    I have performed a physical exam and reviewed and updated ROS and Plan today (12/23/2018). In review of yesterday's note (12/22/2018), there are no changes except as documented above.     Discussed patient condition and risk of morbidity and/or mortality with Hospitalist, RN, RT, Therapies, Pharmacy and Patient  The patient remains critically ill.  Critical care time = 30 minutes in directly providing and coordinating critical care and extensive data review.  No time overlap and excludes procedures.

## 2018-12-23 NOTE — DISCHARGE PLANNING
LYNDON YARBROUGH received call from Heaven with the [facility]. She was requesting patient's information because they did not have it. In checking patient's chart, it appears that DOLORES Rey from our Transfer Center had faxed this information mid-afternoon on 12/22/18.    Patient's facesheet and transfer report printed then faxed to [phone], Attention: Heaven.

## 2018-12-23 NOTE — DISCHARGE PLANNING
Received call from Leonora at [facility]. Due to patients lack of insurance, he is Medicaid pending, they are declining transfer at this time. Leonora stated that when Medicaid is established we may call and ask for review to transfer patient.     Call placed to JOHANA on [facility] at [phone]. Left VM of above note with call back number.

## 2018-12-23 NOTE — DISCHARGE PLANNING
Anticipated Discharge Disposition: acute to acute transfer    Action: Received VM from [facility]. Spoke to Ramya in RTC. Pt is pending Medicaid and [facility] will reassess pt's referral for transfer once the Medicaid is established.    Lsw emailed PFA and cced RN LINNEA Bradshaw. Lsw requesting prioritizing the Medicaid application, if able. Pt needs to d/c to acute hospital in [place] and waiting for Medicaid is the current barrier.       LSw updated bedside RN as above.    Barriers to Discharge: no INS, acceptance, bed, transport    Plan: f/u w/ RN LINNEA, PFA, RTC, medical team

## 2018-12-23 NOTE — PROGRESS NOTES
"Interval History:  Patient is resting.  Still has significant shortness of breath.    Physical Exam   Blood pressure (!) 95/66, pulse (!) 104, temperature 36.9 °C (98.5 °F), temperature source Temporal, resp. rate 19, height 1.93 m (6' 4\"), weight 85.7 kg (188 lb 15 oz), SpO2 94 %.    Constitutional: Mild respiratory distress   hENT: Normocephalic and atraumatic. No scleral icterus.   Neck: JVD is present.   Cardiovascular: Normal rate. Exam reveals no gallop and no friction rub. No murmur heard.   Pulmonary/Chest: Coarse   Abdominal: S/NT/ND BS+   Musculoskeletal: Exhibits no edema. Pulses present.  Skin: Skin is warm and dry.         Intake/Output Summary (Last 24 hours) at 12/23/18 1026  Last data filed at 12/23/18 0800   Gross per 24 hour   Intake             2010 ml   Output             2800 ml   Net             -790 ml       Recent Labs      12/20/18   1045  12/22/18   0440  12/23/18   0520   WBC  13.7*  12.6*  12.9*   RBC  5.34  4.75  4.64*   HEMOGLOBIN  16.4  15.0  14.5   HEMATOCRIT  47.0  41.5*  40.6*   MCV  88.0  87.4  87.5   MCH  30.7  31.6  31.3   MCHC  34.9  36.1*  35.7*   RDW  40.5  40.0  40.1   PLATELETCT  330  266  240   MPV  9.2  9.1  9.2     Recent Labs      12/21/18   0405  12/22/18   0440  12/23/18   0520   SODIUM  139  139  140   POTASSIUM  4.9  4.4  4.0   CHLORIDE  104  105  103   CO2  29  32  34*   GLUCOSE  118*  97  109*   BUN  29*  29*  25*   CREATININE  1.06  0.90  0.87   CALCIUM  8.3*  8.7  8.5     Recent Labs      12/22/18   0940   INR  1.10     Recent Labs      12/20/18   1045   BNPBTYPENAT  1146*     Recent Labs      12/20/18   1045  12/21/18   0854  12/21/18   1800   CPKTOTAL   --    --   52   TROPONINI  0.15*  0.09*   --    BNPBTYPENAT  1146*   --    --      Recent Labs      12/21/18   0405   TRIGLYCERIDE  46   HDL  27*   LDL  73       No current facility-administered medications on file prior to encounter.      No current outpatient prescriptions on file prior to encounter. "       Current Facility-Administered Medications   Medication Dose Frequency Provider Last Rate Last Dose   • furosemide (LASIX) injection 40 mg  40 mg BID DIURETIC Mp Al M.D.       • magnesium sulfate IVPB premix 2 g  2 g Once Mp Al M.D.   Stopped at 12/23/18 1145   • ipratropium-albuterol (DUONEB) nebulizer solution  3 mL Q4H PRN (RT) Mp Al M.D.       • senna-docusate (PERICOLACE or SENOKOT S) 8.6-50 MG per tablet 2 Tab  2 Tab BID JOJO Enriquez.OSharif   Stopped at 12/20/18 1800    And   • polyethylene glycol/lytes (MIRALAX) PACKET 1 Packet  1 Packet QDAY PRN JOJO Enriquez.O.        And   • magnesium hydroxide (MILK OF MAGNESIA) suspension 30 mL  30 mL QDAY PRN JOJO Enriquez.O.        And   • bisacodyl (DULCOLAX) suppository 10 mg  10 mg QDAY PRN Navid Grey D.O.       • Respiratory Care per Protocol   Continuous RT JOJO Enriquez.O.       • acetaminophen (TYLENOL) tablet 650 mg  650 mg Q6HRS PRN JOJO Enriquez.O.       • ondansetron (ZOFRAN) syringe/vial injection 4 mg  4 mg Q4HRS PRN JOJO Enriquez.O.       • ondansetron (ZOFRAN ODT) dispertab 4 mg  4 mg Q4HRS PRN JOJO Enriquez.O.       • promethazine (PHENERGAN) tablet 12.5-25 mg  12.5-25 mg Q4HRS PRN JOJO Enriquez.O.       • promethazine (PHENERGAN) suppository 12.5-25 mg  12.5-25 mg Q4HRS PRN JOJO Enriquez.O.       • prochlorperazine (COMPAZINE) injection 5-10 mg  5-10 mg Q4HRS PRN JOJO Enriquez.O.         Last reviewed on 12/20/2018 11:32 PM by Mp Ryder R.N.  Medications reviewed    Imaging reviewed    ECHO(12/21/2018):  No prior study is available for comparison.   Left ventricle is small in size. Severe concentric left ventricular   hypertrophy. Hyperdynamic left ventricular systolic function. Left   ventricular ejection fraction is visually estimated to be greater than   75%. Abnormal  septal motion consistent with right ventricular (RV)   volume overload and/or elevated RV end-diastolic pressure.   Indeterminate diastolic function. Near mid cavity obstruction.  Severely dilated right ventricle.  Right ventricular systolic pressure is estimated to be 80 mmHg.    CTA chest:  1.  No central or segmental pulmonary embolus  2.  Emphysema  3.  Bibasilar atelectasis with superimposed pneumonia not excluded  4.  Small to moderate RIGHT pleural effusion  5.  Small LEFT pleural effusion  6.  Interstitial pulmonary edema or possibly chronic interstitial lung disease such as IPF/UIP.  7.  Ascites  8.  Findings suggestive of pulmonary arterial hypertension  9.  Small pericardial effusion    Impressions:  1.  Respiratory distress secondary to diastolic heart failure  2.  Possible interstitial lung disease  3.  Infiltrative cardiomyopathy    Recommendations:  He did not have significant net negative.  He does not look significantly volume overloaded clinically.    I think his pulmonary status also significantly contributing to his shortness of breath.  Continue diuresis.    He might have a component of COPD, maybe we can try a short course of steroids but I would leave that up to intensive care physician.  Transfer process in progress.    Discussed with primary physician and bedside nursing.    Do not hesitate to call me with questions regarding the patient care.    Anderson Murillo  Interventional cardiologist  Cell: [phone]

## 2018-12-23 NOTE — ASSESSMENT & PLAN NOTE
Bloody mucus-like per RN  No sign of hemorrhoid on external exam 12/23.  Possible internal hemorrhoids. Start Anusol cream  Needs future colonoscopy once stable, outpatient  Monitor Hgb  12/21 Hgb:12.6  12/22 Hgb:13.7  12/23 Hgb: 14.5 appears to be heme concentrating with diuresis  INR 1.1  Hemoglobin remained stable

## 2018-12-24 ENCOUNTER — APPOINTMENT (OUTPATIENT)
Dept: RADIOLOGY | Facility: MEDICAL CENTER | Age: 58
DRG: 286 | End: 2018-12-24
Attending: INTERNAL MEDICINE
Payer: MEDICAID

## 2018-12-24 ENCOUNTER — APPOINTMENT (OUTPATIENT)
Dept: RADIOLOGY | Facility: MEDICAL CENTER | Age: 58
DRG: 286 | End: 2018-12-24
Attending: HOSPITALIST
Payer: MEDICAID

## 2018-12-24 PROBLEM — I50.813 ACUTE ON CHRONIC RIGHT-SIDED HEART FAILURE (HCC): Status: ACTIVE | Noted: 2018-12-21

## 2018-12-24 PROBLEM — R91.8 ABNORMAL CT SCAN OF LUNG: Status: ACTIVE | Noted: 2018-12-24

## 2018-12-24 LAB
ALBUMIN SERPL BCP-MCNC: 3.1 G/DL (ref 3.2–4.9)
ALBUMIN/GLOB SERPL: 1.1 G/DL
ALP SERPL-CCNC: 74 U/L (ref 30–99)
ALT SERPL-CCNC: 16 U/L (ref 2–50)
ANION GAP SERPL CALC-SCNC: 3 MMOL/L (ref 0–11.9)
AST SERPL-CCNC: 18 U/L (ref 12–45)
BASOPHILS # BLD AUTO: 0.4 % (ref 0–1.8)
BASOPHILS # BLD: 0.05 K/UL (ref 0–0.12)
BILIRUB SERPL-MCNC: 1.1 MG/DL (ref 0.1–1.5)
BUN SERPL-MCNC: 24 MG/DL (ref 8–22)
CALCIUM SERPL-MCNC: 8.7 MG/DL (ref 8.5–10.5)
CHLORIDE SERPL-SCNC: 98 MMOL/L (ref 96–112)
CO2 SERPL-SCNC: 40 MMOL/L (ref 20–33)
CREAT SERPL-MCNC: 0.87 MG/DL (ref 0.5–1.4)
EOSINOPHIL # BLD AUTO: 0.08 K/UL (ref 0–0.51)
EOSINOPHIL NFR BLD: 0.6 % (ref 0–6.9)
ERYTHROCYTE [DISTWIDTH] IN BLOOD BY AUTOMATED COUNT: 39.6 FL (ref 35.9–50)
GLOBULIN SER CALC-MCNC: 2.8 G/DL (ref 1.9–3.5)
GLUCOSE SERPL-MCNC: 110 MG/DL (ref 65–99)
HCT VFR BLD AUTO: 43.9 % (ref 42–52)
HGB BLD-MCNC: 15.6 G/DL (ref 14–18)
IMM GRANULOCYTES # BLD AUTO: 0.05 K/UL (ref 0–0.11)
IMM GRANULOCYTES NFR BLD AUTO: 0.4 % (ref 0–0.9)
LYMPHOCYTES # BLD AUTO: 0.75 K/UL (ref 1–4.8)
LYMPHOCYTES NFR BLD: 6 % (ref 22–41)
MAGNESIUM SERPL-MCNC: 1.8 MG/DL (ref 1.5–2.5)
MCH RBC QN AUTO: 31.3 PG (ref 27–33)
MCHC RBC AUTO-ENTMCNC: 35.5 G/DL (ref 33.7–35.3)
MCV RBC AUTO: 88.2 FL (ref 81.4–97.8)
MONOCYTES # BLD AUTO: 0.59 K/UL (ref 0–0.85)
MONOCYTES NFR BLD AUTO: 4.7 % (ref 0–13.4)
NEUTROPHILS # BLD AUTO: 10.94 K/UL (ref 1.82–7.42)
NEUTROPHILS NFR BLD: 87.9 % (ref 44–72)
NRBC # BLD AUTO: 0 K/UL
NRBC BLD-RTO: 0 /100 WBC
PLATELET # BLD AUTO: 248 K/UL (ref 164–446)
PMV BLD AUTO: 9.4 FL (ref 9–12.9)
POTASSIUM SERPL-SCNC: 4.1 MMOL/L (ref 3.6–5.5)
PROT SERPL-MCNC: 5.9 G/DL (ref 6–8.2)
RBC # BLD AUTO: 4.98 M/UL (ref 4.7–6.1)
SODIUM SERPL-SCNC: 141 MMOL/L (ref 135–145)
WBC # BLD AUTO: 12.5 K/UL (ref 4.8–10.8)

## 2018-12-24 PROCEDURE — 71045 X-RAY EXAM CHEST 1 VIEW: CPT

## 2018-12-24 PROCEDURE — 81404 MOPATH PROCEDURE LEVEL 5: CPT

## 2018-12-24 PROCEDURE — 99233 SBSQ HOSP IP/OBS HIGH 50: CPT | Performed by: INTERNAL MEDICINE

## 2018-12-24 PROCEDURE — 36415 COLL VENOUS BLD VENIPUNCTURE: CPT

## 2018-12-24 PROCEDURE — 700111 HCHG RX REV CODE 636 W/ 250 OVERRIDE (IP): Performed by: INTERNAL MEDICINE

## 2018-12-24 PROCEDURE — 700105 HCHG RX REV CODE 258: Performed by: INTERNAL MEDICINE

## 2018-12-24 PROCEDURE — 94668 MNPJ CHEST WALL SBSQ: CPT

## 2018-12-24 PROCEDURE — 93971 EXTREMITY STUDY: CPT | Mod: 26 | Performed by: SURGERY

## 2018-12-24 PROCEDURE — 99232 SBSQ HOSP IP/OBS MODERATE 35: CPT | Performed by: HOSPITALIST

## 2018-12-24 PROCEDURE — 80053 COMPREHEN METABOLIC PANEL: CPT

## 2018-12-24 PROCEDURE — 700102 HCHG RX REV CODE 250 W/ 637 OVERRIDE(OP): Performed by: HOSPITALIST

## 2018-12-24 PROCEDURE — 93971 EXTREMITY STUDY: CPT | Mod: LT

## 2018-12-24 PROCEDURE — A9270 NON-COVERED ITEM OR SERVICE: HCPCS | Performed by: HOSPITALIST

## 2018-12-24 PROCEDURE — 94760 N-INVAS EAR/PLS OXIMETRY 1: CPT

## 2018-12-24 PROCEDURE — 93971 EXTREMITY STUDY: CPT | Mod: RT

## 2018-12-24 PROCEDURE — 770020 HCHG ROOM/CARE - TELE (206)

## 2018-12-24 PROCEDURE — 83735 ASSAY OF MAGNESIUM: CPT

## 2018-12-24 PROCEDURE — 85025 COMPLETE CBC W/AUTO DIFF WBC: CPT

## 2018-12-24 RX ORDER — FUROSEMIDE 10 MG/ML
40 INJECTION INTRAMUSCULAR; INTRAVENOUS
Status: DISCONTINUED | OUTPATIENT
Start: 2018-12-25 | End: 2018-12-28

## 2018-12-24 RX ORDER — HYDROCORTISONE ACETATE 25 MG/1
25 SUPPOSITORY RECTAL EVERY 12 HOURS
Status: DISCONTINUED | OUTPATIENT
Start: 2018-12-24 | End: 2018-12-31 | Stop reason: HOSPADM

## 2018-12-24 RX ORDER — CHLORAL HYDRATE 500 MG
1000 CAPSULE ORAL
Status: DISCONTINUED | OUTPATIENT
Start: 2018-12-24 | End: 2018-12-31 | Stop reason: HOSPADM

## 2018-12-24 RX ORDER — MAGNESIUM SULFATE HEPTAHYDRATE 40 MG/ML
2 INJECTION, SOLUTION INTRAVENOUS ONCE
Status: COMPLETED | OUTPATIENT
Start: 2018-12-24 | End: 2018-12-24

## 2018-12-24 RX ADMIN — OMEGA-3 FATTY ACIDS CAP 1000 MG 1000 MG: 1000 CAP at 18:09

## 2018-12-24 RX ADMIN — SODIUM CHLORIDE 500 MG: 9 INJECTION, SOLUTION INTRAVENOUS at 18:09

## 2018-12-24 RX ADMIN — OMEGA-3 FATTY ACIDS CAP 1000 MG 1000 MG: 1000 CAP at 08:46

## 2018-12-24 RX ADMIN — FUROSEMIDE 40 MG: 10 INJECTION, SOLUTION INTRAMUSCULAR; INTRAVENOUS at 05:53

## 2018-12-24 RX ADMIN — ENOXAPARIN SODIUM 40 MG: 100 INJECTION SUBCUTANEOUS at 10:17

## 2018-12-24 RX ADMIN — OMEGA-3 FATTY ACIDS CAP 1000 MG 1000 MG: 1000 CAP at 12:02

## 2018-12-24 RX ADMIN — MAGNESIUM SULFATE IN WATER 2 G: 40 INJECTION, SOLUTION INTRAVENOUS at 12:05

## 2018-12-24 ASSESSMENT — PAIN SCALES - GENERAL
PAINLEVEL_OUTOF10: 0

## 2018-12-24 ASSESSMENT — ENCOUNTER SYMPTOMS
NERVOUS/ANXIOUS: 0
BLOOD IN STOOL: 1
HEMOPTYSIS: 0
SPUTUM PRODUCTION: 1
STRIDOR: 0
SENSORY CHANGE: 0
PALPITATIONS: 0
VOMITING: 0
SORE THROAT: 0
BACK PAIN: 0
NECK PAIN: 0
SHORTNESS OF BREATH: 1
CHILLS: 0
COUGH: 1
DIAPHORESIS: 0
HALLUCINATIONS: 0
FEVER: 0
HEADACHES: 0
SPEECH CHANGE: 0
DOUBLE VISION: 0
CONSTIPATION: 0
FOCAL WEAKNESS: 0
SEIZURES: 0
MYALGIAS: 0
BRUISES/BLEEDS EASILY: 0
CHEST TIGHTNESS: 0
SINUS PAIN: 0
PHOTOPHOBIA: 0
NAUSEA: 0
DEPRESSION: 0
ORTHOPNEA: 0
SPUTUM PRODUCTION: 0
BLURRED VISION: 0
ABDOMINAL PAIN: 0

## 2018-12-24 ASSESSMENT — LIFESTYLE VARIABLES: SUBSTANCE_ABUSE: 0

## 2018-12-24 NOTE — DISCHARGE PLANNING
[facility] is cancelling the transfer request at this time due to no payer source. We can resubmit the request once the [place] Medicaid is in place

## 2018-12-24 NOTE — DISCHARGE PLANNING
Anticipated Discharge Disposition: acute to acute transfer once pt has Medicaid     Action: Spoke to charge RN. Conducted chart review and pt has Medicaid application in, but not approved. Yesterday LSw exchanged emails w/ [facility] and the application has been submitted to the supervisor w/ a request to expedite.    Lsw called [facility] this AM they have requested presumptive elgibility which can happen w/in this week at the earliest. With the holiday this will be slowed. Also, if for some reason this does not get approved, we will ask the  to push the referral.    Barriers to Discharge: accepted Medicaid, re-refer to [facility] for transfer    Plan: f/u w/ medical team, [facility],

## 2018-12-24 NOTE — PROGRESS NOTES
12 HR chart check, 2 RN skin assessment and monitor review complete.     Monitor Review:    SR Carrillo ST:       .14/.06/.32

## 2018-12-24 NOTE — PROGRESS NOTES
Critical Care Progress Note    Date of admission  12/20/2018    Chief Complaint  58 y.o. male admitted 12/20/2018 with shortness of breath.    Hospital Course    This gentleman was admitted to the ICU with right heart failure.      Interval Problem Update  Reviewed last 24 hour events:      SR-ST  Breathing better.  Edema better  Eating  Afebrile  4 BM last night  2000 mL fluid restriction  IS 1250  PEP QID  No DVT prophylaxis due to hemorrhoid bleeding  Tele orders in place  Give dose of Diamox      He feels better today.  His shortness of breath is improved.  He has a cough with minimal sputum production.  He denies hemoptysis.  His lower extremity edema is improved.  He has no angina, palpitations or syncope.  He is tolerating his diet.  He has no nausea, vomiting or abdominal pain.  He denies any use of stimulants such as cocaine or methamphetamine.      Review of Systems  Review of Systems   Constitutional: Negative for chills, diaphoresis and fever.   HENT: Negative for ear pain, nosebleeds, sinus pain and tinnitus.    Eyes: Negative for blurred vision, double vision and photophobia.   Respiratory: Positive for cough, sputum production and shortness of breath. Negative for hemoptysis and stridor.    Cardiovascular: Positive for leg swelling. Negative for chest pain, palpitations and orthopnea.   Gastrointestinal: Negative for abdominal pain, constipation, nausea and vomiting.   Genitourinary: Negative for dysuria, frequency, hematuria and urgency.   Musculoskeletal: Negative for back pain, myalgias and neck pain.   Skin: Negative for rash.   Neurological: Negative for sensory change, speech change, focal weakness, seizures and headaches.   Endo/Heme/Allergies: Does not bruise/bleed easily.   Psychiatric/Behavioral: Negative for depression, hallucinations, substance abuse and suicidal ideas.        Vital Signs for last 24 hours   Temp:  [36.3 °C (97.3 °F)-36.9 °C (98.5 °F)] 36.4 °C (97.6 °F)  Pulse:  []  92  Resp:  [16-40] 18    Hemodynamic parameters for last 24 hours       Respiratory       Physical Exam   Physical Exam   Constitutional: He is oriented to person, place, and time. He appears well-developed. No distress.   HENT:   Head: Normocephalic and atraumatic.   Right Ear: External ear normal.   Left Ear: External ear normal.   Nose: Nose normal.   Mouth/Throat: Oropharynx is clear and moist.   Eyes: Pupils are equal, round, and reactive to light. Conjunctivae are normal. Right eye exhibits no discharge. Left eye exhibits no discharge.   Neck: Normal range of motion. Neck supple. No JVD present. No tracheal deviation present.   Cardiovascular: Intact distal pulses.  Exam reveals no gallop and no friction rub.    No murmur heard.  Sinus rhythm   Pulmonary/Chest: No stridor. No respiratory distress. He has no wheezes. He has rales (Few crackles at the bases).   Abdominal: Soft. Bowel sounds are normal. He exhibits no distension. There is no tenderness. There is no rebound.   Musculoskeletal: He exhibits edema. He exhibits no tenderness or deformity.   No clubbing or cyanosis   Neurological: He is alert and oriented to person, place, and time. No cranial nerve deficit. Coordination normal.   No focal weakness   Skin: Skin is warm and dry. No rash noted. He is not diaphoretic. No erythema.   Psychiatric: He has a normal mood and affect. His behavior is normal. Thought content normal.       Medications  Current Facility-Administered Medications   Medication Dose Route Frequency Provider Last Rate Last Dose   • furosemide (LASIX) injection 40 mg  40 mg Intravenous BID DIURETIC Mp lA M.D.   40 mg at 12/23/18 1621   • ipratropium-albuterol (DUONEB) nebulizer solution  3 mL Nebulization Q4H PRN (RT) Mp Al M.D.       • senna-docusate (PERICOLACE or SENOKOT S) 8.6-50 MG per tablet 2 Tab  2 Tab Oral BID Navid Grey D.O.   Stopped at 12/20/18 1800    And   • polyethylene glycol/lytes  (MIRALAX) PACKET 1 Packet  1 Packet Oral QDAY PRN Navid Grey D.O.        And   • magnesium hydroxide (MILK OF MAGNESIA) suspension 30 mL  30 mL Oral QDAY PRN JOJO Enriquez.O.        And   • bisacodyl (DULCOLAX) suppository 10 mg  10 mg Rectal QDAY PRN Navid Grey D.O.       • Respiratory Care per Protocol   Nebulization Continuous RT Navid Grey D.O.       • acetaminophen (TYLENOL) tablet 650 mg  650 mg Oral Q6HRS PRN Navid Grey D.O.       • ondansetron (ZOFRAN) syringe/vial injection 4 mg  4 mg Intravenous Q4HRS PRN JOJO Enriquez.O.       • ondansetron (ZOFRAN ODT) dispertab 4 mg  4 mg Oral Q4HRS PRN Navid Gery D.O.       • promethazine (PHENERGAN) tablet 12.5-25 mg  12.5-25 mg Oral Q4HRS PRN Navid Grey D.O.       • promethazine (PHENERGAN) suppository 12.5-25 mg  12.5-25 mg Rectal Q4HRS PRN Navid Grey D.O.       • prochlorperazine (COMPAZINE) injection 5-10 mg  5-10 mg Intravenous Q4HRS PRN Navid Grey D.O.           Fluids    Intake/Output Summary (Last 24 hours) at 12/24/18 0452  Last data filed at 12/23/18 1800   Gross per 24 hour   Intake             1850 ml   Output             4050 ml   Net            -2200 ml       Laboratory  Recent Labs      12/21/18   0552  12/21/18   0759  12/21/18   1224   [id number]  7.28*  7.34*  7.31*   [id number]  68.3*  59.1*  60.3*   WYBXA225P  70.7  69.0  68.4   CDQW7KJL  91.6*  92.8*  92.1*   ARTHCO3  31*  31*  30*   [id number]  5.0  5.0  5.0   ARTBE  2  4*  2     Recent Labs      12/21/18   0854  12/21/18   1800   CPKTOTAL   --   52   TROPONINI  0.09*   --      Recent Labs      12/22/18   0440  12/23/18   0520   SODIUM  139  140   POTASSIUM  4.4  4.0   CHLORIDE  105  103   CO2  32  34*   BUN  29*  25*   CREATININE  0.90  0.87   MAGNESIUM  1.8  1.9   CALCIUM  8.7  8.5     Recent Labs      12/22/18   0440  12/23/18   0520   ALTSGPT  16  14   ASTSGOT  14  13    ALKPHOSPHAT  64  66   TBILIRUBIN  0.7  0.9   GLUCOSE  97  109*     Recent Labs      12/22/18   0440  12/23/18   0520  12/23/18   1740   WBC  12.6*  12.9*  14.9*   NEUTSPOLYS  86.60*  85.80*  84.60*   LYMPHOCYTES  8.00*  7.60*  8.10*   MONOCYTES  4.70  5.80  5.80   EOSINOPHILS  0.10  0.40  0.70   BASOPHILS  0.20  0.20  0.50   ASTSGOT  14  13   --    ALTSGPT  16  14   --    ALKPHOSPHAT  64  66   --    TBILIRUBIN  0.7  0.9   --      Recent Labs      12/22/18   0440  12/22/18   0940  12/23/18   0520  12/23/18   1740   RBC  4.75   --   4.64*  5.03   HEMOGLOBIN  15.0   --   14.5  15.4   HEMATOCRIT  41.5*   --   40.6*  44.7   PLATELETCT  266   --   240  275   PROTHROMBTM   --   14.3   --    --    INR   --   1.10   --    --        Imaging  X-Ray:  I have personally reviewed the images and compared with prior images. and My impression is: Improved edema    Assessment/Plan  * Acute respiratory failure with hypoxia and hypercapnia (HCC)   Assessment & Plan    Continue oxygen     Abnormal CT scan of lung   Assessment & Plan    CT with evidence of emphysema and ILD  Rheumatoid factor negative  Will need complete PFTs when cardiopulmonary status is improved as well as workup of ILD     GI bleed   Assessment & Plan    Due to hemorrhoids  Trend hemoglobin  No anticoagulation       Pulmonary hypertension (HCC)   Assessment & Plan    Severe with RVSP of 80 mmHg  CTA negative for pulmonary embolism  CT of the lungs with evidence of emphysema and ILD  Continue forced diuresis  No anticoagulation due to bleeding hemorrhoids  Will need right heart cath when medically improved  HIV negative  Needs evaluation for possible primary pulmonary hypertension     Acute on chronic right-sided heart failure (HCC)   Assessment & Plan    Echocardiogram with right ventricular volume overload and reduced right ventricular systolic function  Continue to force diuresis with furosemide  I will give a dose of Diamox for metabolic alkalosis today  Left  ventricle with normal systolic function and impaired diastolic function due to right ventricular dilatation and volume overload          VTE:  Contraindicated  Ulcer: Not Indicated  Lines: None    I have performed a physical exam and reviewed and updated ROS and Plan today (12/24/2018). In review of yesterday's note (12/23/2018), there are no changes except as documented above.     OK to transfer out of ICU.  [facility] will follow.    Discussed patient condition and risk of morbidity and/or mortality with Hospitalist, RN, RT, Pharmacy, Charge nurse / hot rounds, QA team and cardiology     Cheng Combs MD  Pulmonary and Critical Care Medicine

## 2018-12-24 NOTE — PROGRESS NOTES
Hospital Medicine Daily Progress Note    Date of Service  12/24/2018    Chief Complaint  SHort of breath    Hospital Course    58 y.o. male admitted 12/20/2018 with shortness of breath and increasing leg swelling over the prior 4 weeks.  He required BiPAP therapy in ICU       Interval Problem Update  Afebrile  States having brown stool this am.  Good urine output  On 4L NC  Restart DVT prophylaxis  Replace Mg level      Consultants/Specialty  Pulmonary  Cardiology    Code Status  FULL    Disposition  Transfer out of ICU    Review of Systems  Review of Systems   Constitutional: Negative for chills and fever.   HENT: Negative for congestion and sore throat.    Respiratory: Positive for cough and shortness of breath. Negative for sputum production.    Cardiovascular: Positive for leg swelling. Negative for chest pain and palpitations.   Gastrointestinal: Positive for blood in stool. Negative for abdominal pain, constipation, melena, nausea and vomiting.   Genitourinary: Negative for dysuria and hematuria.   Musculoskeletal: Negative for back pain and neck pain.   Neurological: Negative for speech change and headaches.   Psychiatric/Behavioral: Negative for depression. The patient is not nervous/anxious.         Physical Exam  Temp:  [36.3 °C (97.3 °F)-36.9 °C (98.5 °F)] 36.9 °C (98.4 °F)  Pulse:  [] 100  Resp:  [16-40] 16  BP: (114-115)/(72-86) 115/86    Physical Exam   Constitutional: He is oriented to person, place, and time. He appears well-developed and well-nourished. No distress.   HENT:   Head: Normocephalic and atraumatic.   Nose: Nose normal.   Mouth/Throat: Oropharynx is clear and moist.   Eyes: Conjunctivae and EOM are normal. Right eye exhibits no discharge. Left eye exhibits no discharge. No scleral icterus.   Neck: No tracheal deviation present.   Cardiovascular: Normal rate, regular rhythm, normal heart sounds and intact distal pulses.    No murmur heard.  Pulmonary/Chest: Effort normal. No  stridor. No respiratory distress. He has no wheezes. He has rales.   Abdominal: Soft. Bowel sounds are normal. He exhibits no distension. There is no tenderness.   Genitourinary:   Genitourinary Comments: External rectum w/o hemorrhoids. No wounds noted.   Musculoskeletal: He exhibits edema (decreased).   Lymphadenopathy:     He has no cervical adenopathy.   Neurological: He is alert and oriented to person, place, and time. No cranial nerve deficit.   Skin: Skin is warm. He is not diaphoretic.   Psychiatric: He has a normal mood and affect. His behavior is normal. Thought content normal.   Vitals reviewed.      Fluids    Intake/Output Summary (Last 24 hours) at 12/24/18 1818  Last data filed at 12/24/18 1400   Gross per 24 hour   Intake             2018 ml   Output             3250 ml   Net            -1232 ml       Laboratory  Recent Labs      12/23/18   0520  12/23/18   1740  12/24/18   0430   WBC  12.9*  14.9*  12.5*   RBC  4.64*  5.03  4.98   HEMOGLOBIN  14.5  15.4  15.6   HEMATOCRIT  40.6*  44.7  43.9   MCV  87.5  88.9  88.2   MCH  31.3  30.6  31.3   MCHC  35.7*  34.5  35.5*   RDW  40.1  41.1  39.6   PLATELETCT  240  275  248   MPV  9.2  8.9*  9.4     Recent Labs      12/22/18   0440  12/23/18   0520  12/24/18   0430   SODIUM  139  140  141   POTASSIUM  4.4  4.0  4.1   CHLORIDE  105  103  98   CO2  32  34*  40*   GLUCOSE  97  109*  110*   BUN  29*  25*  24*   CREATININE  0.90  0.87  0.87   CALCIUM  8.7  8.5  8.7     Recent Labs      12/22/18   0940   INR  1.10               Imaging  US-EXTREMITY VENOUS LOWER UNILAT LEFT         DX-CHEST-LIMITED (1 VIEW)   Final Result      Improving bibasilar interstitial edema pattern. Unchanged cardiomegaly and small bilateral pleural effusions.      CT-CTA CHEST PULMONARY ARTERY W/ RECONS   Final Result      1.  No central or segmental pulmonary embolus   2.  Emphysema   3.  Bibasilar atelectasis with superimposed pneumonia not excluded   4.  Small to moderate RIGHT pleural  effusion   5.  Small LEFT pleural effusion   6.  Interstitial pulmonary edema or possibly chronic interstitial lung disease such as IPF/UIP.   7.  Ascites   8.  Findings suggestive of pulmonary arterial hypertension   9.  Small pericardial effusion         EC-ECHOCARDIOGRAM COMPLETE W/O CONT   Final Result      DX-CHEST-PORTABLE (1 VIEW)   Final Result         1. Cardiomegaly and bibasilar interstitial/alveolar opacities, right worse than left. They likely represent consolidations and/or edema. Multifocal pneumonia can be considered in the appropriate clinical settings.   2. Small right pleural effusion.      US-EXTREMITY VENOUS LOWER UNILAT RIGHT    (Results Pending)        Assessment/Plan  * Acute respiratory failure with hypoxia and hypercapnia (HCC)   Assessment & Plan    Concern of cardiomyopathy with restriction from hypertrophy  [facility] decline acceptance  Diuresing and monitor strict I/O's  Monitor vitals, labs  On BiPAP just overnight as of 12/22  On nasal canula 12/23 am.  Rt protocol  Pulmonary consulting     Abnormal CT scan of lung   Assessment & Plan    12/21 with emphysema and possible ILD findings     GI bleed   Assessment & Plan    Bloody mucus-like per RN  No sign of hemorrhoid on external exam 12/23.  Possible internal hemorrhoids. Start Anusol cream  Needs future colonoscopy once stable, outpatient  Monitor Hgb  12/21 Hgb:12.6  12/22 Hgb:13.7  12/23 Hgb: 14.5 appears to be heme concentrating with diuresis  INR 1.1     Pulmonary hypertension (HCC)   Assessment & Plan    RVSP: 80mmHg  CTA negative for PE  Supplemental oxygen  Cardiology consulting     Acute on chronic right-sided heart failure (HCC)   Assessment & Plan    Cardiology consulting.  Diuresis, strict I/O's, monitor labs  IMproving peripheral edema.  12/24 Total output since admit: -2.1L  Checking CXR as has ongoing decreased breath sounds right lung on exam  12/21 Echo: Left ventricle is small in size. Severe concentric left  ventricular   hypertrophy. Hyperdynamic left ventricular systolic function. Left   ventricular ejection fraction is visually estimated to be greater than   75%. Abnormal septal motion consistent with right ventricular (RV)   volume overload and/or elevated RV end-diastolic pressure.   Indeterminate diastolic function. Near mid cavity obstruction.  Severely dilated right ventricle.  Right ventricular systolic pressure is estimated to be 80 mmHg.     Elevated troponin   Assessment & Plan    C/O CHF as etiology  monitor     Hypercapnia   Assessment & Plan    On BiPAP last night  On nasal canula 12/23am  Monitor abg  Pulmonary consulting          VTE prophylaxis: SCD

## 2018-12-24 NOTE — PROGRESS NOTES
Cardiology Follow Up Progress Note    Date of Service  12/24/2018    Attending Physician  Cheng Bailey D.O.    Chief Complaint   Shortness of breath and edema.    HPI  Alexis Georges is a 58 y.o. male admitted 12/20/2018 with shortness of breath and lower extremity edema.    Interim Events   12/24: Shortness of breath and edema improved.    Review of Systems  Review of Systems   Respiratory: Positive for cough. Negative for chest tightness.    Cardiovascular: Positive for leg swelling. Negative for chest pain and palpitations.       Vital signs in last 24 hours  Temp:  [36.3 °C (97.3 °F)-36.9 °C (98.5 °F)] 36.7 °C (98.1 °F)  Pulse:  [] 105  Resp:  [16-40] 18  BP: (114)/(72) 114/72    Physical Exam  Physical Exam   Constitutional: He is oriented to person, place, and time. He appears well-nourished. No distress.   HENT:   Head: Normocephalic and atraumatic.   Eyes: EOM are normal. No scleral icterus.   Neck: No JVD present.       Cardiovascular: Normal rate, regular rhythm, S1 normal, S2 normal, normal heart sounds and intact distal pulses.  Exam reveals no gallop and no friction rub.    No murmur heard.  Pulmonary/Chest: Effort normal. He has no wheezes. He has no rhonchi. He has rales.   Musculoskeletal: He exhibits edema.   Neurological: He is alert and oriented to person, place, and time.   Skin: Skin is warm and dry.   Psychiatric: He has a normal mood and affect. His behavior is normal.       Lab Review  Lab Results   Component Value Date/Time    WBC 12.5 (H) 12/24/2018 04:30 AM    RBC 4.98 12/24/2018 04:30 AM    HEMOGLOBIN 15.6 12/24/2018 04:30 AM    HEMATOCRIT 43.9 12/24/2018 04:30 AM    MCV 88.2 12/24/2018 04:30 AM    MCH 31.3 12/24/2018 04:30 AM    MCHC 35.5 (H) 12/24/2018 04:30 AM    MPV 9.4 12/24/2018 04:30 AM      Lab Results   Component Value Date/Time    SODIUM 141 12/24/2018 04:30 AM    POTASSIUM 4.1 12/24/2018 04:30 AM    CHLORIDE 98 12/24/2018 04:30 AM    CO2 40 (H) 12/24/2018 04:30 AM     GLUCOSE 110 (H) 12/24/2018 04:30 AM    BUN 24 (H) 12/24/2018 04:30 AM    CREATININE 0.87 12/24/2018 04:30 AM      Lab Results   Component Value Date/Time    ASTSGOT 18 12/24/2018 04:30 AM    ALTSGPT 16 12/24/2018 04:30 AM     Lab Results   Component Value Date/Time    CHOLSTRLTOT 109 12/21/2018 04:05 AM    LDL 73 12/21/2018 04:05 AM    HDL 27 (A) 12/21/2018 04:05 AM    TRIGLYCERIDE 46 12/21/2018 04:05 AM    TROPONINI 0.09 (H) 12/21/2018 08:54 AM             Cardiac Imaging and Procedures Review  Rhythm: 12/24/2018 normal sinus rhythm reviewed by myself.    ECHOCARDIOGRAM 12/21/2018  ECHO(12/21/2018):  No prior study is available for comparison.   Left ventricle is small in size. Severe concentric left ventricular   hypertrophy. Hyperdynamic left ventricular systolic function. Left   ventricular ejection fraction is visually estimated to be greater than   75%. Abnormal septal motion consistent with right ventricular (RV)   volume overload and/or elevated RV end-diastolic pressure.   Indeterminate diastolic function. Near mid cavity obstruction.  Severely dilated right ventricle.  Right ventricular systolic pressure is estimated to be 80 mmHg.     CTA chest:  1.  No central or segmental pulmonary embolus  2.  Emphysema  3.  Bibasilar atelectasis with superimposed pneumonia not excluded  4.  Small to moderate RIGHT pleural effusion  5.  Small LEFT pleural effusion  6.  Interstitial pulmonary edema or possibly chronic interstitial lung disease such as IPF/UIP.  7.  Ascites  8.  Findings suggestive of pulmonary arterial hypertension  9.  Small pericardial effusion    Imaging  Chest X-Ray: 12/24/2018 interstitial fibrosis and edema with effusion improved from 12/20/2018.  Reviewed by myself.      Assessment/Plan  Impressions:  1.    Acute respiratory failure with evidence of interstitial and emphysematous lung disease with bilateral pleural effusions  2.  Possible interstitial lung disease  3.  Chronic tobacco use with  emphysematous changes on chest CT scan.  4.  Diastolic heart failure most likely secondary to pulmonary arterial hypertension however it is unclear as to the etiology of the primary pathological process which I suspect is primary pulmonary process with secondary severe pulmonary arterial hypertension.    Recommendations and Discussion:  1.  I reviewed the echocardiogram images from 12/21/2018 and agree with the findings except with regards to the issue of an infiltrative process.  2.  I reviewed the chest CT scan images with Dr. Cheng Martínez.  3.  Continue cautious IV diuretics.  4.  Pursue further diagnostic tests to rule out amyloidosis with transthyretin and a cardiac MRI; unfortunately we do not have the transthyretin scintigraphy scan protocol available which is highly sensitive and specific.  5.  Stable to transfer to telemetry.  6.  When euvolemic recommend right and left heart cardiac catheterization.     Discussed with primary physician and bedside nursing.      Please contact me with any questions.    Miguel Newby M.D.   Cardiologist, [facility]  [phone]

## 2018-12-24 NOTE — PROGRESS NOTES
Received bedside report from DOLORES Batres. Assumed care of patient. Patient resting comfortably in bed with no signs of distress. Call light within reach.

## 2018-12-24 NOTE — CARE PLAN
Problem: Hyperinflation:  Goal: Prevent or improve atelectasis  Outcome: PROGRESSING AS EXPECTED  Pt is achieving 1000 mL on IS. PEP is ordered QID

## 2018-12-25 PROBLEM — I95.9 HYPOTENSION: Status: ACTIVE | Noted: 2018-12-25

## 2018-12-25 LAB
ALDOLASE SERPL-CCNC: 7.2 U/L (ref 1.5–8.1)
ANION GAP SERPL CALC-SCNC: 0 MMOL/L (ref 0–11.9)
BACTERIA BLD CULT: NORMAL
BACTERIA BLD CULT: NORMAL
BUN SERPL-MCNC: 20 MG/DL (ref 8–22)
CALCIUM SERPL-MCNC: 9.1 MG/DL (ref 8.5–10.5)
CCP IGG SERPL-ACNC: 3 UNITS (ref 0–19)
CHLORIDE SERPL-SCNC: 100 MMOL/L (ref 96–112)
CO2 SERPL-SCNC: 40 MMOL/L (ref 20–33)
CREAT SERPL-MCNC: 0.9 MG/DL (ref 0.5–1.4)
EKG IMPRESSION: NORMAL
ERYTHROCYTE [DISTWIDTH] IN BLOOD BY AUTOMATED COUNT: 42.8 FL (ref 35.9–50)
GLUCOSE SERPL-MCNC: 116 MG/DL (ref 65–99)
HCT VFR BLD AUTO: 43.2 % (ref 42–52)
HGB BLD-MCNC: 14.4 G/DL (ref 14–18)
MAGNESIUM SERPL-MCNC: 2.1 MG/DL (ref 1.5–2.5)
MCH RBC QN AUTO: 30.6 PG (ref 27–33)
MCHC RBC AUTO-ENTMCNC: 33.3 G/DL (ref 33.7–35.3)
MCV RBC AUTO: 91.7 FL (ref 81.4–97.8)
NUCLEAR IGG SER QL IA: NORMAL
PLATELET # BLD AUTO: 269 K/UL (ref 164–446)
PMV BLD AUTO: 9.3 FL (ref 9–12.9)
POTASSIUM SERPL-SCNC: 4.1 MMOL/L (ref 3.6–5.5)
RBC # BLD AUTO: 4.71 M/UL (ref 4.7–6.1)
SIGNIFICANT IND 70042: NORMAL
SIGNIFICANT IND 70042: NORMAL
SITE SITE: NORMAL
SITE SITE: NORMAL
SODIUM SERPL-SCNC: 140 MMOL/L (ref 135–145)
SOURCE SOURCE: NORMAL
SOURCE SOURCE: NORMAL
WBC # BLD AUTO: 12.3 K/UL (ref 4.8–10.8)

## 2018-12-25 PROCEDURE — 99232 SBSQ HOSP IP/OBS MODERATE 35: CPT | Performed by: INTERNAL MEDICINE

## 2018-12-25 PROCEDURE — 770020 HCHG ROOM/CARE - TELE (206)

## 2018-12-25 PROCEDURE — 93010 ELECTROCARDIOGRAM REPORT: CPT | Performed by: INTERNAL MEDICINE

## 2018-12-25 PROCEDURE — 80048 BASIC METABOLIC PNL TOTAL CA: CPT

## 2018-12-25 PROCEDURE — 90471 IMMUNIZATION ADMIN: CPT

## 2018-12-25 PROCEDURE — 99233 SBSQ HOSP IP/OBS HIGH 50: CPT | Performed by: INTERNAL MEDICINE

## 2018-12-25 PROCEDURE — 700111 HCHG RX REV CODE 636 W/ 250 OVERRIDE (IP): Performed by: INTERNAL MEDICINE

## 2018-12-25 PROCEDURE — A9270 NON-COVERED ITEM OR SERVICE: HCPCS | Performed by: HOSPITALIST

## 2018-12-25 PROCEDURE — 36415 COLL VENOUS BLD VENIPUNCTURE: CPT

## 2018-12-25 PROCEDURE — 90732 PPSV23 VACC 2 YRS+ SUBQ/IM: CPT | Performed by: INTERNAL MEDICINE

## 2018-12-25 PROCEDURE — 85027 COMPLETE CBC AUTOMATED: CPT

## 2018-12-25 PROCEDURE — 3E0234Z INTRODUCTION OF SERUM, TOXOID AND VACCINE INTO MUSCLE, PERCUTANEOUS APPROACH: ICD-10-PCS | Performed by: INTERNAL MEDICINE

## 2018-12-25 PROCEDURE — 700102 HCHG RX REV CODE 250 W/ 637 OVERRIDE(OP): Performed by: HOSPITALIST

## 2018-12-25 PROCEDURE — 93005 ELECTROCARDIOGRAM TRACING: CPT | Performed by: INTERNAL MEDICINE

## 2018-12-25 PROCEDURE — 83735 ASSAY OF MAGNESIUM: CPT

## 2018-12-25 RX ORDER — SODIUM CHLORIDE 9 MG/ML
500 INJECTION, SOLUTION INTRAVENOUS
Status: DISCONTINUED | OUTPATIENT
Start: 2018-12-25 | End: 2018-12-31 | Stop reason: HOSPADM

## 2018-12-25 RX ADMIN — OMEGA-3 FATTY ACIDS CAP 1000 MG 1000 MG: 1000 CAP at 16:55

## 2018-12-25 RX ADMIN — PNEUMOCOCCAL VACCINE POLYVALENT 25 MCG
25; 25; 25; 25; 25; 25; 25; 25; 25; 25; 25; 25; 25; 25; 25; 25; 25; 25; 25; 25; 25; 25; 25 INJECTION, SOLUTION INTRAMUSCULAR; SUBCUTANEOUS at 17:09

## 2018-12-25 RX ADMIN — ENOXAPARIN SODIUM 40 MG: 100 INJECTION SUBCUTANEOUS at 05:45

## 2018-12-25 RX ADMIN — OMEGA-3 FATTY ACIDS CAP 1000 MG 1000 MG: 1000 CAP at 14:00

## 2018-12-25 RX ADMIN — FUROSEMIDE 40 MG: 10 INJECTION, SOLUTION INTRAMUSCULAR; INTRAVENOUS at 05:46

## 2018-12-25 RX ADMIN — OMEGA-3 FATTY ACIDS CAP 1000 MG 1000 MG: 1000 CAP at 05:46

## 2018-12-25 ASSESSMENT — ENCOUNTER SYMPTOMS
PALPITATIONS: 0
NECK PAIN: 0
SORE THROAT: 0
SPEECH CHANGE: 0
COUGH: 1
SPUTUM PRODUCTION: 0
VOMITING: 0
HEADACHES: 0
BACK PAIN: 0
NAUSEA: 0
ABDOMINAL PAIN: 0
SHORTNESS OF BREATH: 1
NERVOUS/ANXIOUS: 0
CHEST TIGHTNESS: 0
DEPRESSION: 0
FEVER: 0
BLOOD IN STOOL: 0
CHILLS: 0
CONSTIPATION: 0

## 2018-12-25 ASSESSMENT — PAIN SCALES - GENERAL
PAINLEVEL_OUTOF10: 0

## 2018-12-25 NOTE — PROGRESS NOTES
Pulmonary Progress Note     Date of admission  12/20/2018    HISTORY OF PRESENT ILLNESS:  This is a 58-year-old  is a male who reports no   past medical history, smokes 3-5 cigarettes on a daily basis, presented to the   hospital on 12/20/2018 with complaints of 2 weeks of increasing dyspnea on   exertion, generalized fatigue, 4 weeks of increased lower extremity swelling   and abdominal distention.  Denies any fever, chills, sweats.  Denies any   active chest pain, no syncopal episodes, lightheadedness.  Denies any nausea,   vomiting, abdominal pain.  No diarrhea or dysuria.  Denies any recent travel.    Denies any history of clotting disorders.  Denies any stimulant utilization   or drug use. Patient was initially admitted to telemetry being worked up for   congestive heart failure given a BNP of greater than 1000 and hypoxia;   however, he continued to decline requiring BiPAP therapy and subsequently  transferred to the intensive care unit.     12/25/18: patient transferred out of ICU. Admits improvemnt in symptoms of   dyspnea and mild dependent edema. Denies productive cough or expectoration   no symptoms of reflux.  He denies any use of stimulants such as cocaine or   Methamphetamine. Occasionally uses marijuana. 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   Prior to that had been working as .        Review of Systems  Review of Systems   Constitutional: Negative for chills, diaphoresis and fever.   HENT: Negative for ear pain, nosebleeds, sinus pain and tinnitus.    Eyes: Negative for blurred vision, double vision and photophobia.   Respiratory: Negative for hemoptysis and stridor. No dyspnea or cough  Cardiovascular: Positive for leg swelling. Negative for chest pain, palpitations and orthopnea.   Gastrointestinal: Negative for abdominal pain, constipation, nausea and vomiting.   Genitourinary: Negative for dysuria, frequency, hematuria  and urgency.   Musculoskeletal: Negative for back pain, myalgias and neck pain.   Skin: Negative for rash.   Neurological: Negative for sensory change, speech change, focal weakness, seizures and headaches.   Endo/Heme/Allergies: Does not bruise/bleed easily.   Psychiatric/Behavioral: Negative for depression, hallucinations, substance abuse and suicidal ideas.         Vital Signs    Temp: 36.9 °C (98 °F)  Pulse:  92  Resp:   16  BP: 135/89  SpO2: 90%     Physical Exam   Physical Exam   Constitutional: He is oriented to person, place, and time. He appears well-developed. No distress.   HENT:   Head: Normocephalic and atraumatic.   Right Ear: External ear normal.   Left Ear: External ear normal.   Nose: Nose normal.   Mouth/Throat: Oropharynx is clear and moist.   Eyes: Pupils are equal, round, and reactive to light. Conjunctivae are normal.  Neck: Normal range of motion. Neck supple. No JVD present. No tracheal deviation present.   Cardiovascular: Intact distal pulses.  Heart sounds normal No murmur heard.  Pulmonary/Chest: No stridor. No respiratory distress. He has no wheezes. Diminished breath   sounds at bases bailaterally  Abdominal: Soft. Bowel sounds are normal. He exhibits no distension. There is no tenderness.   There is no rebound.   Musculoskeletal: He exhibits 1+ edema. He exhibits no tenderness or deformity.   No clubbing or cyanosis   Neurological: He is alert and oriented to person, place, and time. No cranial nerve deficit.   Coordination normal. No focal weakness   Skin: Skin is warm and dry. No rash noted. He is not diaphoretic. No erythema.   Psychiatric: He has a normal mood and affect. His behavior is normal. Thought content normal.         Medications  Enoxaparin  Furosemide  Fish oil capsule  Hydrocortisone suppository  helene-docusate     Fluids     Intake/Output Summary (Last 24 hours) at 12/25/18      Gross per 24 hour   Intake             2148 ml   Output             3250 ml   Net             -1102 ml         Laboratory        Recent Labs      12/21/18   0552  12/21/18   0759  12/21/18   1224   WZSBF45F  7.28*  7.34*  7.31*   [id number]  68.3*  59.1*  60.3*   UVAEH812I  70.7  69.0  68.4   RNEY2BIT  91.6*  92.8*  92.1*   ARTHCO3  31*  31*  30*   [id number]  5.0  5.0  5.0   ARTBE  2  4*  2       Results for DINA CROUCH (MRN [id number]) as of 12/25/2018 11:45   Ref. Range 12/25/2018 03:23   WBC Latest Ref Range: 4.8 - 10.8 K/uL 12.3 (H)   RBC Latest Ref Range: 4.70 - 6.10 M/uL 4.71   Hemoglobin Latest Ref Range: 14.0 - 18.0 g/dL 14.4   Hematocrit Latest Ref Range: 42.0 - 52.0 % 43.2   MCV Latest Ref Range: 81.4 - 97.8 fL 91.7   MCH Latest Ref Range: 27.0 - 33.0 pg 30.6   MCHC Latest Ref Range: 33.7 - 35.3 g/dL 33.3 (L)   RDW Latest Ref Range: 35.9 - 50.0 fL 42.8   Platelet Count Latest Ref Range: 164 - 446 K/uL 269   MPV Latest Ref Range: 9.0 - 12.9 fL 9.3   Sodium Latest Ref Range: 135 - 145 mmol/L 140   Potassium Latest Ref Range: 3.6 - 5.5 mmol/L 4.1   Chloride Latest Ref Range: 96 - 112 mmol/L 100   Co2 Latest Ref Range: 20 - 33 mmol/L 40 (H)   Anion Gap Latest Ref Range: 0.0 - 11.9  0.0   Glucose Latest Ref Range: 65 - 99 mg/dL 116 (H)   Bun Latest Ref Range: 8 - 22 mg/dL 20   Creatinine Latest Ref Range: 0.50 - 1.40 mg/dL 0.90   GFR If  Latest Ref Range: >60 mL/min/1.73 m 2 >60   GFR If Non  Latest Ref Range: >60 mL/min/1.73 m 2 >60   Calcium Latest Ref Range: 8.5 - 10.5 mg/dL 9.1   Magnesium Latest Ref Range: 1.5 - 2.5 mg/dL 2.1     Imaging  X-Ray:  I have personally reviewed the images and compared with prior images. and My impression is: Improved edema     Assessment/Plan      * Acute respiratory failure with hypoxia and hypercapnia (HCC)        Continue oxygen   Pulmonary fibrosis and emphysema        CT with evidence of emphysema and ILD  Rheumatoid factor negative  Will need complete PFTs when cardiopulmonary status is improved as well as workup of ILD   GI bleed         Due to hemorrhoids  Trend hemoglobin  No anticoagulation   Pulmonary hypertension (HCC)        Severe with RVSP of 80 mmHg  CTA negative for pulmonary embolism  CT of the lungs with evidence of emphysema and ILD  Continue forced diuresis  No anticoagulation due to bleeding hemorrhoids  Will need right heart cath when medically improved  HIV negative  Etiology is likely Pulmonary disease      Bilateral Pleural effusion with basal atelectasis    Recommend right thoracentesis by IR  Incentive spirometry

## 2018-12-25 NOTE — PROGRESS NOTES
Assumed care at 0700, bedside report received from night shift RN. Patient is AOx4 with no signs of distress. Pt. Is SR 98 on the monitor. Initial assessment completed, orders reviewed, call light within reach, and hourly rounding in place. POC addressed with patient, no additional questions at this time.

## 2018-12-25 NOTE — ASSESSMENT & PLAN NOTE
CT with evidence of emphysema and ILD  Rheumatoid factor negative  Will need complete PFTs when cardiopulmonary status is improved as well as workup of ILD

## 2018-12-25 NOTE — PROGRESS NOTES
Received report and care assumed. Patient A&Ox 4. Pt. Reports no pain. Work of breathing labored with exertion on 5L nasal canula. Discussed POC. Call light within reach and pt. instructed to call when in need of assistance.  Denies any other needs at this time.

## 2018-12-25 NOTE — PROGRESS NOTES
Patient had 11 beats V.tach. On call cardiologist notified, received no further orders. Will continue to monitor.

## 2018-12-25 NOTE — ASSESSMENT & PLAN NOTE
12/21 with emphysema and possible ILD findings; bilateral pleural effusion  Pulmonary following.  Will need outpatient pulmonary follow-up

## 2018-12-25 NOTE — PROGRESS NOTES
Cardiology Follow Up Progress Note    Date of Service  12/25/2018    Attending Physician  Cheng Bailey D.O.    Chief Complaint   Shortness of breath and edema.    HPI  Alexis Georges is a 58 y.o. male admitted 12/20/2018 with shortness of breath and lower extremity edema.    Interim Events   12/24: Shortness of breath and edema improved.  Urine output 2050 cc  12/25: Shortness of breath and edema continues to improve.  Urine output 3250 cc feels better.  Smoked a few cigarettes intermittently.    Review of Systems  Review of Systems   Respiratory: Positive for cough. Negative for chest tightness.    Cardiovascular: Positive for leg swelling. Negative for chest pain and palpitations.       Vital signs in last 24 hours  Temp:  [36.4 °C (97.6 °F)-36.9 °C (98.4 °F)] 36.9 °C (98.4 °F)  Pulse:  [] 98  Resp:  [16-20] 16  BP: (114-135)/(72-96) 135/89    Physical Exam  Physical Exam   Constitutional: He is oriented to person, place, and time. He appears well-nourished. No distress.   HENT:   Head: Normocephalic and atraumatic.   Eyes: EOM are normal. No scleral icterus.   Neck: No JVD present.       Cardiovascular: Normal rate, regular rhythm, S1 normal, S2 normal, normal heart sounds and intact distal pulses.  Exam reveals no gallop and no friction rub.    No murmur heard.  Pulmonary/Chest: Effort normal. He has no wheezes. He has no rhonchi. He has no rales.   Rhonchi   Musculoskeletal: He exhibits edema.   Edema improved.   Neurological: He is alert and oriented to person, place, and time.   Skin: Skin is warm and dry.   Psychiatric: He has a normal mood and affect. His behavior is normal.       Lab Review  Lab Results   Component Value Date/Time    WBC 12.3 (H) 12/25/2018 03:23 AM    RBC 4.71 12/25/2018 03:23 AM    HEMOGLOBIN 14.4 12/25/2018 03:23 AM    HEMATOCRIT 43.2 12/25/2018 03:23 AM    MCV 91.7 12/25/2018 03:23 AM    MCH 30.6 12/25/2018 03:23 AM    MCHC 33.3 (L) 12/25/2018 03:23 AM    MPV 9.3 12/25/2018  03:23 AM      Lab Results   Component Value Date/Time    SODIUM 140 12/25/2018 03:23 AM    POTASSIUM 4.1 12/25/2018 03:23 AM    CHLORIDE 100 12/25/2018 03:23 AM    CO2 40 (H) 12/25/2018 03:23 AM    GLUCOSE 116 (H) 12/25/2018 03:23 AM    BUN 20 12/25/2018 03:23 AM    CREATININE 0.90 12/25/2018 03:23 AM      Lab Results   Component Value Date/Time    ASTSGOT 18 12/24/2018 04:30 AM    ALTSGPT 16 12/24/2018 04:30 AM     Lab Results   Component Value Date/Time    CHOLSTRLTOT 109 12/21/2018 04:05 AM    LDL 73 12/21/2018 04:05 AM    HDL 27 (A) 12/21/2018 04:05 AM    TRIGLYCERIDE 46 12/21/2018 04:05 AM    TROPONINI 0.09 (H) 12/21/2018 08:54 AM             Cardiac Imaging and Procedures Review  Rhythm: 12/24/2018 normal sinus rhythm reviewed by myself.    ECHOCARDIOGRAM 12/21/2018  ECHO(12/21/2018):  No prior study is available for comparison.   Left ventricle is small in size. Severe concentric left ventricular   hypertrophy. Hyperdynamic left ventricular systolic function. Left   ventricular ejection fraction is visually estimated to be greater than   75%. Abnormal septal motion consistent with right ventricular (RV)   volume overload and/or elevated RV end-diastolic pressure.   Indeterminate diastolic function. Near mid cavity obstruction.  Severely dilated right ventricle.  Right ventricular systolic pressure is estimated to be 80 mmHg.     CTA chest:  1.  No central or segmental pulmonary embolus  2.  Emphysema  3.  Bibasilar atelectasis with superimposed pneumonia not excluded  4.  Small to moderate RIGHT pleural effusion  5.  Small LEFT pleural effusion  6.  Interstitial pulmonary edema or possibly chronic interstitial lung disease such as IPF/UIP.  7.  Ascites  8.  Findings suggestive of pulmonary arterial hypertension  9.  Small pericardial effusion    Imaging  Chest X-Ray: 12/24/2018 interstitial fibrosis and edema with effusion improved from 12/20/2018.  Reviewed by myself.      Assessment/Plan  Impressions:  1.    " Acute respiratory failure with evidence of interstitial and emphysematous lung disease with bilateral pleural effusions.    2.    Pulmonary hypertension severe.  3.  Interstitial lung disease.  4.  Emphysema.  5.  Chronic tobacco use.    4.  Diastolic heart failure either related to severe pulmonary hypertension and intra-cardiac transmural compliance abnormality or a primary left heart \"infiltrative\" process that was initially suggested.    Recommendations and Discussion:  1.  Clinically the patient is improving with good diuretic response maintaining normal blood pressure and renal function.  2.  Continue IV diuresis  3.  Transthyretin level pending.  Unfortunately do not have transthyretin scintigraphy available at this institution.  4.  Cardiac MRI when more stable.  5.  When euvolemic recommend right and left heart cardiac catheterization.   6.  Discussed at length with patient.    Discussed with bedside nurse.      Please contact me with any questions.    Miguel Newby M.D.   Cardiologist, [facility]  [phone]      "

## 2018-12-26 ENCOUNTER — APPOINTMENT (OUTPATIENT)
Dept: RADIOLOGY | Facility: MEDICAL CENTER | Age: 58
DRG: 286 | End: 2018-12-26
Attending: INTERNAL MEDICINE
Payer: MEDICAID

## 2018-12-26 PROBLEM — J90 PLEURAL EFFUSION, BILATERAL: Status: ACTIVE | Noted: 2018-12-26

## 2018-12-26 LAB
ALBUMIN 24H UR QL ELPH: DETECTED
ALPHA1 GLOB 24H UR QL ELPH: DETECTED
ALPHA2 GLOB 24H UR QL ELPH: DETECTED
ANCA IGG TITR SER IF: NORMAL {TITER}
ANION GAP SERPL CALC-SCNC: 0 MMOL/L (ref 0–11.9)
B-GLOBULIN UR QL ELPH: DETECTED
BUN SERPL-MCNC: 21 MG/DL (ref 8–22)
CALCIUM SERPL-MCNC: 9 MG/DL (ref 8.5–10.5)
CHLORIDE SERPL-SCNC: 102 MMOL/L (ref 96–112)
CO2 SERPL-SCNC: 35 MMOL/L (ref 20–33)
COLLECT DURATION TIME SPEC: ABNORMAL HRS
CREAT SERPL-MCNC: 0.81 MG/DL (ref 0.5–1.4)
GAMMA GLOB UR QL ELPH: DETECTED
GLUCOSE SERPL-MCNC: 110 MG/DL (ref 65–99)
INTERPRETATION UR IFE-IMP: ABNORMAL
KAPPA LC FREE UR-MCNC: 34.8 MG/DL (ref 0.14–2.42)
KAPPA LC FREE/LAMBDA FREE UR: 20.12 RATIO (ref 2.04–10.37)
LAMBDA LC FREE UR-MCNC: 1.73 MG/DL (ref 0.02–0.67)
NUCLEAR IGG SER QL IA: NORMAL
POTASSIUM SERPL-SCNC: 4.5 MMOL/L (ref 3.6–5.5)
PROT 24H UR-MRATE: ABNORMAL MG/D (ref 10–140)
SODIUM SERPL-SCNC: 137 MMOL/L (ref 135–145)
SPECIMEN VOL ?TM UR: ABNORMAL ML

## 2018-12-26 PROCEDURE — 770020 HCHG ROOM/CARE - TELE (206)

## 2018-12-26 PROCEDURE — 700102 HCHG RX REV CODE 250 W/ 637 OVERRIDE(OP): Performed by: HOSPITALIST

## 2018-12-26 PROCEDURE — 99233 SBSQ HOSP IP/OBS HIGH 50: CPT | Performed by: INTERNAL MEDICINE

## 2018-12-26 PROCEDURE — 84165 PROTEIN E-PHORESIS SERUM: CPT

## 2018-12-26 PROCEDURE — A9270 NON-COVERED ITEM OR SERVICE: HCPCS | Performed by: HOSPITALIST

## 2018-12-26 PROCEDURE — 80048 BASIC METABOLIC PNL TOTAL CA: CPT

## 2018-12-26 PROCEDURE — 36415 COLL VENOUS BLD VENIPUNCTURE: CPT

## 2018-12-26 PROCEDURE — 84160 ASSAY OF PROTEIN ANY SOURCE: CPT

## 2018-12-26 PROCEDURE — 99407 BEHAV CHNG SMOKING > 10 MIN: CPT

## 2018-12-26 PROCEDURE — 700111 HCHG RX REV CODE 636 W/ 250 OVERRIDE (IP): Performed by: INTERNAL MEDICINE

## 2018-12-26 PROCEDURE — 700111 HCHG RX REV CODE 636 W/ 250 OVERRIDE (IP): Performed by: HOSPITALIST

## 2018-12-26 PROCEDURE — 99232 SBSQ HOSP IP/OBS MODERATE 35: CPT | Mod: GC | Performed by: INTERNAL MEDICINE

## 2018-12-26 RX ADMIN — FUROSEMIDE 40 MG: 10 INJECTION, SOLUTION INTRAMUSCULAR; INTRAVENOUS at 16:54

## 2018-12-26 RX ADMIN — OMEGA-3 FATTY ACIDS CAP 1000 MG 1000 MG: 1000 CAP at 16:54

## 2018-12-26 RX ADMIN — FUROSEMIDE 40 MG: 10 INJECTION, SOLUTION INTRAMUSCULAR; INTRAVENOUS at 05:30

## 2018-12-26 RX ADMIN — PROCHLORPERAZINE EDISYLATE 5 MG: 5 INJECTION INTRAMUSCULAR; INTRAVENOUS at 00:43

## 2018-12-26 RX ADMIN — HYDROCORTISONE ACETATE 25 MG: 25 SUPPOSITORY RECTAL at 16:55

## 2018-12-26 RX ADMIN — ENOXAPARIN SODIUM 40 MG: 100 INJECTION SUBCUTANEOUS at 05:29

## 2018-12-26 RX ADMIN — OMEGA-3 FATTY ACIDS CAP 1000 MG 1000 MG: 1000 CAP at 13:21

## 2018-12-26 RX ADMIN — OMEGA-3 FATTY ACIDS CAP 1000 MG 1000 MG: 1000 CAP at 05:28

## 2018-12-26 ASSESSMENT — ENCOUNTER SYMPTOMS
NAUSEA: 0
BLURRED VISION: 0
DOUBLE VISION: 0
BRUISES/BLEEDS EASILY: 0
ORTHOPNEA: 0
DEPRESSION: 0
SPUTUM PRODUCTION: 0
CONSTIPATION: 0
COUGH: 0
SORE THROAT: 0
NERVOUS/ANXIOUS: 0
ABDOMINAL PAIN: 0
TINGLING: 0
CHILLS: 0
WEIGHT LOSS: 0
VOMITING: 0
FEVER: 0
HEADACHES: 0
CHEST TIGHTNESS: 0
PHOTOPHOBIA: 0
SHORTNESS OF BREATH: 1
NECK PAIN: 0
SPEECH CHANGE: 0
BLOOD IN STOOL: 0
PALPITATIONS: 0
BACK PAIN: 0
COUGH: 1
POLYDIPSIA: 0

## 2018-12-26 ASSESSMENT — PAIN SCALES - GENERAL
PAINLEVEL_OUTOF10: 0

## 2018-12-26 ASSESSMENT — PATIENT HEALTH QUESTIONNAIRE - PHQ9
SUM OF ALL RESPONSES TO PHQ9 QUESTIONS 1 AND 2: 0
1. LITTLE INTEREST OR PLEASURE IN DOING THINGS: NOT AT ALL
2. FEELING DOWN, DEPRESSED, IRRITABLE, OR HOPELESS: NOT AT ALL

## 2018-12-26 NOTE — PROGRESS NOTES
Cardiology Follow Up Progress Note    Date of Service  12/26/2018    Attending Physician  Cheng Bailey D.O.    Chief Complaint   Shortness of breath and edema.    HPI  Alexis Georges is a 58 y.o. male admitted 12/20/2018 with shortness of breath and lower extremity edema.    Interim Events   12/24: Shortness of breath and edema improved.  Urine output 2050 cc  12/25: Shortness of breath and edema continues to improve.  Urine output 3250 cc feels better.  Smoked a few cigarettes intermittently.  12/26: No cardiac symptoms.  Overall feeling better.  Continues to tolerate diuresis.  Urine output 3250 cc.    Review of Systems  Review of Systems   Respiratory: Positive for cough. Negative for chest tightness.    Cardiovascular: Positive for leg swelling. Negative for chest pain and palpitations.     Vital signs in last 24 hours  Temp:  [36.6 °C (97.9 °F)-36.9 °C (98.5 °F)] 36.6 °C (97.9 °F)  Pulse:  [] 97  Resp:  [16-18] 16  BP: ()/(63-94) 117/80    Physical Exam  Physical Exam   Constitutional: He is oriented to person, place, and time. He appears well-nourished. No distress.   HENT:   Head: Normocephalic and atraumatic.   Neck: No JVD present.       Cardiovascular: Normal rate, regular rhythm, S1 normal, S2 normal, normal heart sounds and intact distal pulses.  Exam reveals no gallop and no friction rub.    No murmur heard.  Pulmonary/Chest: Effort normal. He has wheezes. He has no rhonchi. He has no rales.   Rhonchi   Musculoskeletal: He exhibits edema.   Edema improved.   Neurological: He is alert and oriented to person, place, and time.   Skin: Skin is warm and dry.   Psychiatric: He has a normal mood and affect. His behavior is normal.       Lab Review  Lab Results   Component Value Date/Time    WBC 12.3 (H) 12/25/2018 03:23 AM    RBC 4.71 12/25/2018 03:23 AM    HEMOGLOBIN 14.4 12/25/2018 03:23 AM    HEMATOCRIT 43.2 12/25/2018 03:23 AM    MCV 91.7 12/25/2018 03:23 AM    MCH 30.6 12/25/2018 03:23 AM     MCHC 33.3 (L) 12/25/2018 03:23 AM    MPV 9.3 12/25/2018 03:23 AM      Lab Results   Component Value Date/Time    SODIUM 137 12/26/2018 04:13 AM    POTASSIUM 4.5 12/26/2018 04:13 AM    CHLORIDE 102 12/26/2018 04:13 AM    CO2 35 (H) 12/26/2018 04:13 AM    GLUCOSE 110 (H) 12/26/2018 04:13 AM    BUN 21 12/26/2018 04:13 AM    CREATININE 0.81 12/26/2018 04:13 AM      Lab Results   Component Value Date/Time    ASTSGOT 18 12/24/2018 04:30 AM    ALTSGPT 16 12/24/2018 04:30 AM     Lab Results   Component Value Date/Time    CHOLSTRLTOT 109 12/21/2018 04:05 AM    LDL 73 12/21/2018 04:05 AM    HDL 27 (A) 12/21/2018 04:05 AM    TRIGLYCERIDE 46 12/21/2018 04:05 AM    TROPONINI 0.09 (H) 12/21/2018 08:54 AM             Cardiac Imaging and Procedures Review  Rhythm: 12/26/2018 normal sinus rhythm reviewed by myself.    ECHOCARDIOGRAM 12/21/2018  ECHO(12/21/2018):  No prior study is available for comparison.   Left ventricle is small in size. Severe concentric left ventricular   hypertrophy. Hyperdynamic left ventricular systolic function. Left   ventricular ejection fraction is visually estimated to be greater than   75%. Abnormal septal motion consistent with right ventricular (RV)   volume overload and/or elevated RV end-diastolic pressure.   Indeterminate diastolic function. Near mid cavity obstruction.  Severely dilated right ventricle.  Right ventricular systolic pressure is estimated to be 80 mmHg.     CTA chest:  1.  No central or segmental pulmonary embolus  2.  Emphysema  3.  Bibasilar atelectasis with superimposed pneumonia not excluded  4.  Small to moderate RIGHT pleural effusion  5.  Small LEFT pleural effusion  6.  Interstitial pulmonary edema or possibly chronic interstitial lung disease such as IPF/UIP.  7.  Ascites  8.  Findings suggestive of pulmonary arterial hypertension  9.  Small pericardial effusion    Imaging  Chest X-Ray: 12/24/2018 interstitial fibrosis and edema with effusion improved from 12/20/2018.   Reviewed by myself.    Assessment/Plan  Impressions:  1.    Acute respiratory failure with evidence of interstitial and emphysematous lung disease with bilateral pleural effusions.  Improving.  2.    Pulmonary hypertension severe.  3.  Interstitial lung disease.  4.  Emphysema.  5.  Chronic tobacco use.    4.  Diastolic heart failure concern for infiltrative cardiomyopathy.  5.  Abnormal urine electrophoresis.    Recommendations and Discussion:  1.  Clinically the patient is improving tolerating IV diuresis.  2.  Continue IV diuresis  3.  Abnormal urine protein electrophoresis predominantly kappa light chains..  4.  Oncology consultation.  5.  Apparently scheduled for thoracentesis today.  6.  Transthyretin results pending.  7.  Cardiac MRI when clinically stable.  8.  Right and left heart cardiac catheterization when clinically improved.    Discussed with patient and bedside nurse.  I have called and contacted the oncologist who will see the patient in consultation.    Please contact me with any questions.    Miguel Newby M.D.   Cardiologist, [facility]  [phone]

## 2018-12-26 NOTE — CARE PLAN
Problem: Safety  Goal: Will remain free from injury  Outcome: PROGRESSING AS EXPECTED  Standard precautions in place; pt is steady ambulating and is up self to bathroom.     Problem: Knowledge Deficit  Goal: Knowledge of the prescribed therapeutic regimen will improve  Outcome: PROGRESSING AS EXPECTED  Pt is able to verbalize understanding of current plan of care.

## 2018-12-26 NOTE — PROGRESS NOTES
Received report and care assumed. Patient A&Ox 4. Pt. Reports no pain. Work of breathing labored on 5L nasal cannula. Discussed POC. Call light within reach and pt. instructed to call when in need of assistance.  Denies any other needs at this time.

## 2018-12-26 NOTE — PROGRESS NOTES
Bedside report received from [facility] RN [place], pt is awake and alert with O2 on 5L via nasal canula. Bed is locked in lowest position with call light, belongings within reach, white board updated, and POC discussed. All needs met at this time.

## 2018-12-26 NOTE — CONSULTS
DATE OF SERVICE:  12/26/2018    REQUESTING PHYSICIAN:  Miguel Newby MD    REASON FOR CONSULTATION:  Concern for cardiac amyloidosis.    HISTORY OF PRESENT ILLNESS:  He is a 58-year-old -American gentleman   who has not have any past medical history.  He smokes 3-5 cigarettes a day on   a daily basis for many years.    On 12/20/18, the patient presented to the hospital with 1 month history of   bilateral worsening lower extremity swelling, fatigue, for the past 2 weeks he   is having worsening dyspnea mainly on exertion.  He did not have any fever or   chills.  He denies illicit drugs use or heavy alcohol intake.  He did have a   good appetite.  He was found to have signs and symptoms suggestive of   congestive heart failure, BNP greater than 1000 and hypoxia.  The patient was   initially admitted to the ICU with BiPAP and now he has been transferred to   the floor after aggressive diuresis.  During the hospital stay, he had an   echocardiogram on 12/21/18 where the left ejection fraction of 75%.  He had a   small left ventricular size with severe concentric left ventricular   hypertrophy.  Hyperdynamic left ventricular systolic function, ejection   fraction estimated at 75% with abnormal septal motion consistent with right   ventricular volume overload and/or elevated right ventricular end diastolic   pressure.  Severe dilated right ventricular and right ventricular systolic   pressure estimated to be at 80.  The concern for cardiac amyloidosis has been   made.  The patient underwent 24-hour urine protein collection and results are   consistent with free kappa light chain of 34.8, free lambda light chain of   1.73, ratio 20.12, showing a polyclonal elevation without monoclonal proteins   seen on immunofixation.  We were consulted for these findings.  In the   meantime, cardiology recommended transthyretin amyloidosis evaluation and also   plan to do a cardiac MRI once the symptoms improved.  He also  had a CT scan   of the chest with PE protocol which showed no pulmonary embolus, it did show   emphysema and bibasilar atelectasis with superimposed pneumonia, could not be   excluded.  There is a small to moderate right pleural effusion, small left   pleural effusion and signs of interstitial pulmonary edema or possible chronic   interstitial lung disease.  He was found to have ascites and findings   suggestive of pulmonary hypertension on the CT scan.    Otherwise, he does have mild leukocytosis with left shift, hemoglobin within   normal limits as well the platelet count.  Chemistry study, he was found to   have a normal kidney function and BUN.  There are no signs of hypercalcemia.    He does have a low albumin of 3.1, total protein of 5.9.  He was also found to   have a positive stool test for blood.    REVIEW OF SYSTEMS:  A 14-point review of systems negative, besides stated   above.    PAST MEDICAL HISTORY:  Unremarkable.    FAMILY HISTORY:  Unremarkable with no history of cancer or blood disorders.    SOCIAL HISTORY:  The patient smokes 3-5 cigarettes a day for the past few   years.  He denies the use of illicit drugs.  He is not a heavy drinker.    PAST SURGICAL HISTORY:  Unremarkable.    MEDICATIONS:  Per medication reconciliation note.    PHYSICAL EXAMINATION:  VITAL SIGNS:  Temperature 37.1, respiratory rate 16, 95% on 5 liters nasal   cannula, blood pressure of 85/59.  GENERAL:  The patient is in no distress.  HEENT:  Mucous membranes are moist.  NECK:  No lymphadenopathy.  LUNGS:  Decreased air entry on the right.  ABDOMEN:  Soft, nontender and nondistended.  Positive bowel sounds.  EXTREMITIES:  There is bilateral lower extremity swelling.    LABORATORY DATA:  As stated above.    IMAGING STUDIES:  As stated above.    ASSESSMENT AND PLAN:  He is a 58-year-old gentleman with no significant past   medical history who was admitted with acute respiratory failure with evidence   of interstitial and  emphysematous lung disease with bilateral pleural effusion   and ascitic fluid.  Also, he was found to have pulmonary hypertension and   diastolic heart failure, concern for infiltrative cardiomyopathy.  The patient   did have a urine protein electrophoresis, which showed polyclonal elevation   in free light chain levels with no monoclonal protein seen.  I will order a further workup including serum protein electrophoresis   and free light chains.  If these studies are unremarkable, less likely he does   have AL amyloidosis.  Transthyretin amyloidosis have been ordered as well by   cardiology, results are pending.  Eventually, the patient will require a   cardiac MRI if  concerned for amyloidosis persists.    In the meantime, the patient is going for a thoracentesis for   evaluation of the pleural fluid.  He does have something going on,   rheumatologic workup pending.  He does have pleural effusion ascitic fluid   interstitial lung disease, which will explain the polyclonal elevation of free   light chains.  We will keep following the patient.  I will pass this   recommendation to Dr. Champion.       ____________________________________     CHRISTINA Goyal / NATIVIDAD    DD:  12/26/2018 13:37:11  DT:  12/26/2018 14:34:22    D#:  [id number]  Job#:  [id number]

## 2018-12-26 NOTE — PROGRESS NOTES
Hospital Medicine Daily Progress Note    Date of Service  12/25/2018    Chief Complaint  SHort of breath    Hospital Course    58 y.o. male admitted 12/20/2018 with shortness of breath and increasing leg swelling over the prior 4 weeks.  He required BiPAP therapy in ICU       Interval Problem Update    Patient is out of ICU to telemetry floor  States that he feels good.  Shortness of breath and leg swelling improved.  Denies melena.  Hemoglobin is stable.  Good urine output  On 4-5L NC  Pulmonary and cardiology following.  MRI of the heart and catheterization is in consideration    Update: In p.m. patient was noted to be asymptomatically hypotensive his blood pressure 85/60.  Discussed with RN.  We will recheck blood pressure and bolus if needed.  Holding Lasix in p.m.    Consultants/Specialty  Pulmonary  Cardiology    Code Status  FULL    Disposition  Telemetry    Review of Systems  Review of Systems   Constitutional: Negative for chills and fever.   HENT: Negative for congestion and sore throat.    Respiratory: Positive for cough and shortness of breath. Negative for sputum production.    Cardiovascular: Positive for leg swelling. Negative for chest pain and palpitations.   Gastrointestinal: Negative for abdominal pain, blood in stool, constipation, melena, nausea and vomiting.   Genitourinary: Negative for dysuria and hematuria.   Musculoskeletal: Negative for back pain and neck pain.   Neurological: Negative for speech change and headaches.   Psychiatric/Behavioral: Negative for depression. The patient is not nervous/anxious.         Physical Exam  Temp:  [36.4 °C (97.6 °F)-36.9 °C (98.5 °F)] 36.9 °C (98.5 °F)  Pulse:  [] 102  Resp:  [16-20] 16  BP: ()/(63-96) 85/66    Physical Exam   Constitutional: He is oriented to person, place, and time. He appears well-developed and well-nourished. No distress.   HENT:   Head: Normocephalic and atraumatic.   Nose: Nose normal.   Mouth/Throat: Oropharynx is  clear and moist.   Eyes: Conjunctivae and EOM are normal. Right eye exhibits no discharge. Left eye exhibits no discharge. No scleral icterus.   Neck: No tracheal deviation present.   Cardiovascular: Normal rate, regular rhythm, normal heart sounds and intact distal pulses.    No murmur heard.  Pulmonary/Chest: Effort normal. No stridor. No respiratory distress. He has no wheezes. He has rales.   Abdominal: Soft. Bowel sounds are normal. He exhibits no distension. There is no tenderness.   Musculoskeletal: He exhibits edema (decreased).   Lymphadenopathy:     He has no cervical adenopathy.   Neurological: He is alert and oriented to person, place, and time. No cranial nerve deficit.   Skin: Skin is warm. He is not diaphoretic.   Psychiatric: He has a normal mood and affect. His behavior is normal. Thought content normal.   Vitals reviewed.      Fluids    Intake/Output Summary (Last 24 hours) at 12/25/18 1749  Last data filed at 12/25/18 1653   Gross per 24 hour   Intake             1478 ml   Output             2550 ml   Net            -1072 ml       Laboratory  Recent Labs      12/23/18   1740  12/24/18   0430  12/25/18   0323   WBC  14.9*  12.5*  12.3*   RBC  5.03  4.98  4.71   HEMOGLOBIN  15.4  15.6  14.4   HEMATOCRIT  44.7  43.9  43.2   MCV  88.9  88.2  91.7   MCH  30.6  31.3  30.6   MCHC  34.5  35.5*  33.3*   RDW  41.1  39.6  42.8   PLATELETCT  275  248  269   MPV  8.9*  9.4  9.3     Recent Labs      12/23/18   0520  12/24/18   0430  12/25/18   0323   SODIUM  140  141  140   POTASSIUM  4.0  4.1  4.1   CHLORIDE  103  98  100   CO2  34*  40*  40*   GLUCOSE  109*  110*  116*   BUN  25*  24*  20   CREATININE  0.87  0.87  0.90   CALCIUM  8.5  8.7  9.1                   Imaging  US-EXTREMITY VENOUS LOWER UNILAT LEFT   Final Result      DX-CHEST-LIMITED (1 VIEW)   Final Result      Improving bibasilar interstitial edema pattern. Unchanged cardiomegaly and small bilateral pleural effusions.      CT-CTA CHEST PULMONARY  ARTERY W/ RECONS   Final Result      1.  No central or segmental pulmonary embolus   2.  Emphysema   3.  Bibasilar atelectasis with superimposed pneumonia not excluded   4.  Small to moderate RIGHT pleural effusion   5.  Small LEFT pleural effusion   6.  Interstitial pulmonary edema or possibly chronic interstitial lung disease such as IPF/UIP.   7.  Ascites   8.  Findings suggestive of pulmonary arterial hypertension   9.  Small pericardial effusion         EC-ECHOCARDIOGRAM COMPLETE W/O CONT   Final Result      DX-CHEST-PORTABLE (1 VIEW)   Final Result         1. Cardiomegaly and bibasilar interstitial/alveolar opacities, right worse than left. They likely represent consolidations and/or edema. Multifocal pneumonia can be considered in the appropriate clinical settings.   2. Small right pleural effusion.      US-EXTREMITY VENOUS LOWER UNILAT RIGHT    (Results Pending)        Assessment/Plan  * Acute respiratory failure with hypoxia and hypercapnia (HCC)   Assessment & Plan    Concern of cardiomyopathy with restriction from hypertrophy  Conner decline acceptance  Diuresing and monitor strict I/O's  Monitor vitals, labs  On BiPAP just overnight as of 12/22  On nasal canula 12/23 am.  Rt protocol  Pulmonary consulting     Abnormal CT scan of lung   Assessment & Plan    12/21 with emphysema and possible ILD findings  Pulmonary following.  Will need PFT as outpatient     GI bleed   Assessment & Plan    Bloody mucus-like per RN  No sign of hemorrhoid on external exam 12/23.  Possible internal hemorrhoids. Start Anusol cream  Needs future colonoscopy once stable, outpatient  Monitor Hgb  12/21 Hgb:12.6  12/22 Hgb:13.7  12/23 Hgb: 14.5 appears to be heme concentrating with diuresis  INR 1.1  Hemoglobin remained stable     Pulmonary hypertension (HCC)   Assessment & Plan    RVSP: 80mmHg  CTA negative for PE  Supplemental oxygen  Cardiology consulted     Acute on chronic right-sided heart failure (HCC)   Assessment & Plan     Cardiology consulting.  Diuresis, strict I/O's, monitor labs  IMproving peripheral edema.  12/24 Total output since admit: -2.1L  Checking CXR as has ongoing decreased breath sounds right lung on exam  12/21 Echo: Left ventricle is small in size. Severe concentric left ventricular   hypertrophy. Hyperdynamic left ventricular systolic function. Left   ventricular ejection fraction is visually estimated to be greater than   75%. Abnormal septal motion consistent with right ventricular (RV)   volume overload and/or elevated RV end-diastolic pressure.   Indeterminate diastolic function. Near mid cavity obstruction.  Severely dilated right ventricle.  Right ventricular systolic pressure is estimated to be 80 mmHg.  Pulmonary hypertension probably secondary to pulmonary disease.  Heart catheterization considered while in the hospital     Elevated troponin   Assessment & Plan    C/O CHF as etiology  monitor     Hypercapnia   Assessment & Plan    On BiPAP last night  On nasal canula 12/23am  Monitor abg  Pulmonary consulted     Hypotension   Assessment & Plan    Asymptomatic  Probably secondary to diuresis.  We will hold Lasix  Continue monitor blood pressure  Bolus as needed          VTE prophylaxis: SCD

## 2018-12-26 NOTE — PROGRESS NOTES
BP 85/66. Patient asymptomatic. Lasix held. Dr. Champion notified, came to bedside. Stated to recheck BP in one hour and bolus ordered PRN for BP < 90/60.

## 2018-12-27 ENCOUNTER — APPOINTMENT (OUTPATIENT)
Dept: RADIOLOGY | Facility: MEDICAL CENTER | Age: 58
DRG: 286 | End: 2018-12-27
Attending: INTERNAL MEDICINE
Payer: MEDICAID

## 2018-12-27 LAB — ANCA IGG TITR SER IF: NORMAL {TITER}

## 2018-12-27 PROCEDURE — 700111 HCHG RX REV CODE 636 W/ 250 OVERRIDE (IP): Performed by: INTERNAL MEDICINE

## 2018-12-27 PROCEDURE — 99232 SBSQ HOSP IP/OBS MODERATE 35: CPT | Performed by: INTERNAL MEDICINE

## 2018-12-27 PROCEDURE — A9270 NON-COVERED ITEM OR SERVICE: HCPCS | Performed by: HOSPITALIST

## 2018-12-27 PROCEDURE — 770020 HCHG ROOM/CARE - TELE (206)

## 2018-12-27 PROCEDURE — 76604 US EXAM CHEST: CPT

## 2018-12-27 PROCEDURE — 700102 HCHG RX REV CODE 250 W/ 637 OVERRIDE(OP): Performed by: HOSPITALIST

## 2018-12-27 RX ADMIN — HYDROCORTISONE ACETATE 25 MG: 25 SUPPOSITORY RECTAL at 06:00

## 2018-12-27 RX ADMIN — OMEGA-3 FATTY ACIDS CAP 1000 MG 1000 MG: 1000 CAP at 17:05

## 2018-12-27 RX ADMIN — OMEGA-3 FATTY ACIDS CAP 1000 MG 1000 MG: 1000 CAP at 04:59

## 2018-12-27 RX ADMIN — FUROSEMIDE 40 MG: 10 INJECTION, SOLUTION INTRAMUSCULAR; INTRAVENOUS at 17:05

## 2018-12-27 RX ADMIN — OMEGA-3 FATTY ACIDS CAP 1000 MG 1000 MG: 1000 CAP at 13:07

## 2018-12-27 RX ADMIN — FUROSEMIDE 40 MG: 10 INJECTION, SOLUTION INTRAMUSCULAR; INTRAVENOUS at 04:59

## 2018-12-27 ASSESSMENT — ENCOUNTER SYMPTOMS
BLOOD IN STOOL: 0
ABDOMINAL PAIN: 0
BACK PAIN: 0
SHORTNESS OF BREATH: 1
PALPITATIONS: 0
BRUISES/BLEEDS EASILY: 0
COUGH: 1
ORTHOPNEA: 0
SORE THROAT: 0
POLYDIPSIA: 0
NERVOUS/ANXIOUS: 0
NECK PAIN: 0
COUGH: 0
CHILLS: 0
NAUSEA: 0
SPEECH CHANGE: 0
FEVER: 0
CONSTIPATION: 0
HEADACHES: 0
DOUBLE VISION: 0
BLURRED VISION: 0
TINGLING: 0
SPUTUM PRODUCTION: 0
CHEST TIGHTNESS: 0
PHOTOPHOBIA: 0
VOMITING: 0
DEPRESSION: 0
WEIGHT LOSS: 0

## 2018-12-27 ASSESSMENT — PAIN SCALES - GENERAL
PAINLEVEL_OUTOF10: 0

## 2018-12-27 ASSESSMENT — PATIENT HEALTH QUESTIONNAIRE - PHQ9
SUM OF ALL RESPONSES TO PHQ9 QUESTIONS 1 AND 2: 0
2. FEELING DOWN, DEPRESSED, IRRITABLE, OR HOPELESS: NOT AT ALL
1. LITTLE INTEREST OR PLEASURE IN DOING THINGS: NOT AT ALL

## 2018-12-27 NOTE — PROGRESS NOTES
Assumed care at 0700, bedside report received from night shift RN. Patient is AOx4 with no signs of distress. Pt. Is SR on the monitor. Initial assessment completed, orders reviewed, call light within reach, and hourly rounding in place. POC addressed with patient, no additional questions at this time.

## 2018-12-27 NOTE — PROGRESS NOTES
Pulmonary Progress Note     Date of admission  12/20/2018    HISTORY OF PRESENT ILLNESS:  This is a 58-year-old  is a male who reports no   past medical history, smokes 3-5 cigarettes on a daily basis, presented to the   hospital on 12/20/2018 with complaints of 2 weeks of increasing dyspnea on   exertion, generalized fatigue, 4 weeks of increased lower extremity swelling   and abdominal distention.  Denies any fever, chills, sweats.  Denies any   active chest pain, no syncopal episodes, lightheadedness.  Denies any nausea,   vomiting, abdominal pain.  No diarrhea or dysuria.  Denies any recent travel.    Denies any history of clotting disorders.  Denies any stimulant utilization   or drug use. Patient was initially admitted to telemetry being worked up for   congestive heart failure given a BNP of greater than 1000 and hypoxia;   however, he continued to decline requiring BiPAP therapy and subsequently  transferred to the intensive care unit.     12/25/18: patient transferred out of ICU. Admits improvemnt in symptoms of   dyspnea and mild dependent edema. Denies productive cough or expectoration   no symptoms of reflux.  He denies any use of stimulants such as cocaine or   Methamphetamine. Occasionally uses marijuana. 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   Prior to that had been working as .    12/26/18: Denies any complaints today. Reports improvement in sob. SpO2   above the 90 on O2 supplementation @ 5 LPM. On diuretics. Net fluid balance   is -2300 over the past 24hrs.     12/27/18: Clinically stable and symptomatically improved. O2 supplementation   @4 LPM via NC. Scheduled for thoracentesis today.     Review of Systems  Review of Systems   Constitutional: Negative for chills, diaphoresis and fever.   HENT: Negative for ear pain, nosebleeds, sinus pain and tinnitus.    Eyes: Negative for blurred vision, double vision and photophobia.    Respiratory: Negative for hemoptysis and stridor. No dyspnea or cough  Cardiovascular: Positive for leg swelling. Negative for chest pain, palpitations   and orthopnea.   Gastrointestinal: Negative for abdominal pain, constipation, nausea and vomiting.   Genitourinary: Negative for dysuria, frequency, hematuria and urgency.   Musculoskeletal: Negative for back pain, myalgias and neck pain.   Skin: Negative for rash.   Neurological: Negative for sensory change, speech change, focal weakness,   seizures and headaches.   Endo/Heme/Allergies: Does not bruise/bleed easily.   Psychiatric/Behavioral: Negative for depression, hallucinations, substance abuse   and suicidal ideas.         Vital Signs    Temp: 36.9 °C (98 °F)  Pulse:  92  Resp:  16  BP: 135/89  SpO2: 90% on O2 @ 4LPM via NC     Physical Exam   Physical Exam   Constitutional: He is oriented to person, place, and time. No respiratory distress.   HENT:   Head: Normocephalic and atraumatic.   Right Ear: External ear normal.   Left Ear: External ear normal.   Nose: Nose normal.   Mouth/Throat: Oropharynx is clear and moist.   Eyes: Pupils are equal, round, and reactive to light. Conjunctivae are normal.  Neck: Normal range of motion. Neck supple. No JVD present. No tracheal deviation   present.   Cardiovascular: Intact distal pulses.  Heart sounds normal No murmur heard.  Pulmonary/Chest: No stridor. No respiratory distress. He has no wheezes.   Diminished breath sounds at bases bilaterally  Abdominal: Soft. Bowel sounds are normal. He exhibits no distension. There is   no tenderness.   There is no rebound.   Musculoskeletal: He exhibits 1+ edema. He exhibits no tenderness or deformity.   No clubbing or cyanosis   Neurological: He is alert and oriented to person, place, and time. No cranial nerve deficit.   Coordination normal. No focal weakness   Skin: Skin is warm and dry. No rash noted. He is not diaphoretic. No erythema.      Medications  Furosemide  Fish oil  capsule  Hydrocortisone suppository  helene-docusate     Fluids     Intake/Output Summary (Last 24 hours) at 12/25/18      Gross per 24 hour   Intake             2148 ml   Output             3250 ml   Net            -1102 ml         Laboratory        Recent Labs      12/21/18   0552  12/21/18   0759  12/21/18   1224   [id number]  7.28*  7.34*  7.31*   [id number]  68.3*  59.1*  60.3*   [id number]  70.7  69.0  68.4   [id number]  91.6*  92.8*  92.1*   ARTHCO3  31*  31*  30*   [id number]  5.0  5.0  5.0   ARTBE  2  4*  2       Results for DINA CROUCH (MRN [id number]) as of 12/25/2018 11:45   Ref. Range 12/25/2018 03:23   WBC Latest Ref Range: 4.8 - 10.8 K/uL 12.3 (H)   RBC Latest Ref Range: 4.70 - 6.10 M/uL 4.71   Hemoglobin Latest Ref Range: 14.0 - 18.0 g/dL 14.4   Hematocrit Latest Ref Range: 42.0 - 52.0 % 43.2   MCV Latest Ref Range: 81.4 - 97.8 fL 91.7   MCH Latest Ref Range: 27.0 - 33.0 pg 30.6   MCHC Latest Ref Range: 33.7 - 35.3 g/dL 33.3 (L)   RDW Latest Ref Range: 35.9 - 50.0 fL 42.8   Platelet Count Latest Ref Range: 164 - 446 K/uL 269   MPV Latest Ref Range: 9.0 - 12.9 fL 9.3   Sodium Latest Ref Range: 135 - 145 mmol/L 140   Potassium Latest Ref Range: 3.6 - 5.5 mmol/L 4.1   Chloride Latest Ref Range: 96 - 112 mmol/L 100   Co2 Latest Ref Range: 20 - 33 mmol/L 40 (H)   Anion Gap Latest Ref Range: 0.0 - 11.9  0.0   Glucose Latest Ref Range: 65 - 99 mg/dL 116 (H)   Bun Latest Ref Range: 8 - 22 mg/dL 20   Creatinine Latest Ref Range: 0.50 - 1.40 mg/dL 0.90   GFR If  Latest Ref Range: >60 mL/min/1.73 m 2 >60   GFR If Non  Latest Ref Range: >60 mL/min/1.73 m 2 >60   Calcium Latest Ref Range: 8.5 - 10.5 mg/dL 9.1   Magnesium Latest Ref Range: 1.5 - 2.5 mg/dL 2.1     Imaging  X-Ray:  I have personally reviewed the images and compared with prior images.    Assessment/Plan    # Hypoxic hypercapnic respiratory failure  # Severe pulmonary HTN  # Pulmonary fibrosis/ ILD  # Bilateral pleural  effusions  # Cadiomyopathy    - Denies sob today. Saturating in the 90s on 4LPM  - Taper O2 as tolerated to maintain for SpO2 > 90%  - Continue diuresis for severe pulmonary hypertension  - Scheduled for thoracentesis today  - CTA excluded PE- no indication for anticoagulation, but shows mild emphysema and ILD  - Patient will need right heart cath, complete LFTs as well as further workup of ILD once      cardiopulmonary status improves.  - We will continue to follow, further recs after thoracentesis

## 2018-12-27 NOTE — PROGRESS NOTES
Patient requesting another tray of lunch and snacks despite being at his sodium restriction. Per Dr. Champion, pt is okay to have another lunch tray delivered. Dietary called.

## 2018-12-27 NOTE — CARE PLAN
Problem: Communication  Goal: The ability to communicate needs accurately and effectively will improve  Outcome: PROGRESSING AS EXPECTED  Pt. Is able to express comfort needs effectively. Pt. Denies pain or discomfort at this time and is able to vocalize wants and needs.     Problem: Mobility  Goal: Risk for activity intolerance will decrease  Outcome: PROGRESSING AS EXPECTED  Pt. is able to ambulate no assist, without equipment. Pt. Calls for assistance when needed. He is steady on feet.

## 2018-12-27 NOTE — RESPIRATORY CARE
COPD EDUCATION by COPD CLINICAL EDUCATOR  12/26/2018 at 4:33 PM by Radha Kim     Patient interviewed by COPD/Lung Diesease education team. Patient refused COPD program at this time.  He denies any pulmonary diseases at this timeA comprehensive packet including information about COPD, treatments, and smoking cessation given.

## 2018-12-27 NOTE — PROGRESS NOTES
Hospital Medicine Daily Progress Note    Date of Service  12/26/2018    Chief Complaint  SHort of breath    Hospital Course    58 y.o. male admitted 12/20/2018 with shortness of breath and increasing leg swelling over the prior 4 weeks.  He required BiPAP therapy in ICU       Interval Problem Update    Shortness of breath improved  Bilateral pleural effusion: Pending thoracentesis per pulmonary  Abnormal urine protein electrophoresis: Consulted by oncology  Transthyretin results pending    Denies melena.  Hemoglobin is stable.  Good urine output  On 4-5L NC  Pulmonary and cardiology following.  MRI of the heart and catheterization later on during hospital stay    I discussed patient with oncologist   POC discussed with RN, case management, the patient    Consultants/Specialty  Pulmonary  Cardiology  Oncology  Code Status  FULL    Disposition  Telemetry    Review of Systems  Review of Systems   Constitutional: Negative for chills, fever and weight loss.   HENT: Negative for congestion, hearing loss and sore throat.    Eyes: Negative for blurred vision, double vision and photophobia.   Respiratory: Positive for shortness of breath. Negative for cough and sputum production.    Cardiovascular: Positive for leg swelling. Negative for chest pain, palpitations and orthopnea.   Gastrointestinal: Negative for abdominal pain, blood in stool, constipation, melena, nausea and vomiting.   Genitourinary: Negative for dysuria and hematuria.   Musculoskeletal: Negative for back pain and neck pain.   Skin: Negative for itching and rash.   Neurological: Negative for tingling, speech change and headaches.   Endo/Heme/Allergies: Negative for polydipsia. Does not bruise/bleed easily.   Psychiatric/Behavioral: Negative for depression and suicidal ideas. The patient is not nervous/anxious.         Physical Exam  Temp:  [36.6 °C (97.9 °F)-37.2 °C (98.9 °F)] 36.9 °C (98.5 °F)  Pulse:  [] 99  Resp:  [15-18] 16  BP:  ()/(59-94) 118/69    Physical Exam   Constitutional: He is oriented to person, place, and time. He appears well-developed and well-nourished. No distress.   HENT:   Head: Normocephalic and atraumatic.   Nose: Nose normal.   Mouth/Throat: Oropharynx is clear and moist.   Eyes: Conjunctivae and EOM are normal. Right eye exhibits no discharge. Left eye exhibits no discharge. No scleral icterus.   Neck: No tracheal deviation present.   Cardiovascular: Normal rate, regular rhythm, normal heart sounds and intact distal pulses.    No murmur heard.  Pulmonary/Chest: Effort normal. No stridor. No respiratory distress. He has no wheezes. He has rales.   Abdominal: Soft. Bowel sounds are normal. He exhibits no distension. There is no tenderness.   Musculoskeletal: He exhibits edema (decreased).   Lymphadenopathy:     He has no cervical adenopathy.   Neurological: He is alert and oriented to person, place, and time. No cranial nerve deficit.   Skin: Skin is warm. He is not diaphoretic.   Psychiatric: He has a normal mood and affect. His behavior is normal. Thought content normal.   Vitals reviewed.      Fluids    Intake/Output Summary (Last 24 hours) at 12/26/18 1844  Last data filed at 12/26/18 1000   Gross per 24 hour   Intake              118 ml   Output              875 ml   Net             -757 ml       Laboratory  Recent Labs      12/24/18   0430  12/25/18   0323   WBC  12.5*  12.3*   RBC  4.98  4.71   HEMOGLOBIN  15.6  14.4   HEMATOCRIT  43.9  43.2   MCV  88.2  91.7   MCH  31.3  30.6   MCHC  35.5*  33.3*   RDW  39.6  42.8   PLATELETCT  248  269   MPV  9.4  9.3     Recent Labs      12/24/18   0430  12/25/18   0323  12/26/18   0413   SODIUM  141  140  137   POTASSIUM  4.1  4.1  4.5   CHLORIDE  98  100  102   CO2  40*  40*  35*   GLUCOSE  110*  116*  110*   BUN  24*  20  21   CREATININE  0.87  0.90  0.81   CALCIUM  8.7  9.1  9.0                   Imaging  US-EXTREMITY VENOUS LOWER UNILAT LEFT   Final Result       DX-CHEST-LIMITED (1 VIEW)   Final Result      Improving bibasilar interstitial edema pattern. Unchanged cardiomegaly and small bilateral pleural effusions.      CT-CTA CHEST PULMONARY ARTERY W/ RECONS   Final Result      1.  No central or segmental pulmonary embolus   2.  Emphysema   3.  Bibasilar atelectasis with superimposed pneumonia not excluded   4.  Small to moderate RIGHT pleural effusion   5.  Small LEFT pleural effusion   6.  Interstitial pulmonary edema or possibly chronic interstitial lung disease such as IPF/UIP.   7.  Ascites   8.  Findings suggestive of pulmonary arterial hypertension   9.  Small pericardial effusion         EC-ECHOCARDIOGRAM COMPLETE W/O CONT   Final Result      DX-CHEST-PORTABLE (1 VIEW)   Final Result         1. Cardiomegaly and bibasilar interstitial/alveolar opacities, right worse than left. They likely represent consolidations and/or edema. Multifocal pneumonia can be considered in the appropriate clinical settings.   2. Small right pleural effusion.      US-EXTREMITY VENOUS LOWER UNILAT RIGHT    (Results Pending)   US-THORACENTESIS PUNCTURE RIGHT    (Results Pending)        Assessment/Plan  * Acute respiratory failure with hypoxia and hypercapnia (HCC)   Assessment & Plan    Concern of cardiomyopathy with restriction from hypertrophy  [place] decline acceptance  Diuresing and monitor strict I/O's  Monitor vitals, labs  On BiPAP just overnight as of 12/22  On nasal canula 12/23 am.  Rt protocol  Pulmonary consulting     Abnormal CT scan of lung   Assessment & Plan    12/21 with emphysema and possible ILD findings; bilateral pleural effusion  Pulmonary following.  Will need PFT as outpatient       GI bleed   Assessment & Plan    Bloody mucus-like per RN  No sign of hemorrhoid on external exam 12/23.  Possible internal hemorrhoids. Start Anusol cream  Needs future colonoscopy once stable, outpatient  Monitor Hgb  12/21 Hgb:12.6  12/22 Hgb:13.7  12/23 Hgb: 14.5 appears to be heme  concentrating with diuresis  INR 1.1  Hemoglobin remained stable     Pulmonary hypertension (HCC)   Assessment & Plan    RVSP: 80mmHg  CTA negative for PE  Supplemental oxygen  Cardiology consulted  Will need heart catheterization     Acute on chronic right-sided heart failure (HCC)   Assessment & Plan    Cardiology consulting.  Diuresis, strict I/O's, monitor labs  IMproving peripheral edema.  12/24 Total output since admit: -2.1L  Checking CXR as has ongoing decreased breath sounds right lung on exam  12/21 Echo: Left ventricle is small in size. Severe concentric left ventricular   hypertrophy. Hyperdynamic left ventricular systolic function. Left   ventricular ejection fraction is visually estimated to be greater than   75%. Abnormal septal motion consistent with right ventricular (RV)   volume overload and/or elevated RV end-diastolic pressure.   Indeterminate diastolic function. Near mid cavity obstruction.  Severely dilated right ventricle.  Right ventricular systolic pressure is estimated to be 80 mmHg.  Pulmonary hypertension probably secondary to pulmonary disease.  Heart catheterization considered while in the hospital  Diastolic heart failure: Concern for amyloidosis. transthyretin amyloidosis evaluation and MRI of the heart is pending     Elevated troponin   Assessment & Plan    C/O CHF as etiology  monitor     Hypercapnia   Assessment & Plan    On BiPAP last night  On nasal canula 12/23am  Monitor abg  Pulmonary consulted     Pleural effusion, bilateral   Assessment & Plan    Thoracentesis pending     Hypotension   Assessment & Plan    Resolved          VTE prophylaxis: SCD

## 2018-12-27 NOTE — PROGRESS NOTES
Cardiology Follow Up Progress Note    Date of Service  12/27/2018    Attending Physician  Cheng Bailey D.O.    Chief Complaint   Shortness of breath and edema.    HPI  Alexis Georges is a 58 y.o. male admitted 12/20/2018 with shortness of breath and lower extremity edema.    Interim Events   12/24: Shortness of breath and edema improved.  Urine output 2050 cc  12/25: Shortness of breath and edema continues to improve.  Urine output 3250 cc feels better.  Smoked a few cigarettes intermittently.  12/26: No cardiac symptoms.  Overall feeling better.  Continues to tolerate diuresis.  Urine output 3250 cc.  12/27: No new cardiac symptoms.  Continues to feel better with diuresis and less edema.  Urine output 3375 cc.  Review of Systems  Review of Systems   Respiratory: Positive for cough. Negative for chest tightness.    Cardiovascular: Positive for leg swelling. Negative for chest pain and palpitations.     Vital signs in last 24 hours  Temp:  [36.3 °C (97.3 °F)-37.2 °C (98.9 °F)] 36.6 °C (97.8 °F)  Pulse:  [] 94  Resp:  [15-18] 18  BP: ()/(61-82) 103/65    Physical Exam  Physical Exam   Constitutional: He is oriented to person, place, and time. He appears well-nourished. No distress.   HENT:   Head: Normocephalic.   Neck: No JVD present.       Cardiovascular: Normal rate, regular rhythm, S1 normal, S2 normal, normal heart sounds and intact distal pulses.  Exam reveals no gallop and no friction rub.    No murmur heard.  Pulmonary/Chest: Effort normal. He has wheezes. He has no rhonchi. He has no rales.   Rhonchi   Musculoskeletal: He exhibits edema.   Edema improved.   Neurological: He is alert and oriented to person, place, and time.   Skin: Skin is warm and dry.   Psychiatric: He has a normal mood and affect. His behavior is normal.       Lab Review  Lab Results   Component Value Date/Time    WBC 12.3 (H) 12/25/2018 03:23 AM    RBC 4.71 12/25/2018 03:23 AM    HEMOGLOBIN 14.4 12/25/2018 03:23 AM     HEMATOCRIT 43.2 12/25/2018 03:23 AM    MCV 91.7 12/25/2018 03:23 AM    MCH 30.6 12/25/2018 03:23 AM    MCHC 33.3 (L) 12/25/2018 03:23 AM    MPV 9.3 12/25/2018 03:23 AM      Lab Results   Component Value Date/Time    SODIUM 137 12/26/2018 04:13 AM    POTASSIUM 4.5 12/26/2018 04:13 AM    CHLORIDE 102 12/26/2018 04:13 AM    CO2 35 (H) 12/26/2018 04:13 AM    GLUCOSE 110 (H) 12/26/2018 04:13 AM    BUN 21 12/26/2018 04:13 AM    CREATININE 0.81 12/26/2018 04:13 AM      Lab Results   Component Value Date/Time    ASTSGOT 18 12/24/2018 04:30 AM    ALTSGPT 16 12/24/2018 04:30 AM     Lab Results   Component Value Date/Time    CHOLSTRLTOT 109 12/21/2018 04:05 AM    LDL 73 12/21/2018 04:05 AM    HDL 27 (A) 12/21/2018 04:05 AM    TRIGLYCERIDE 46 12/21/2018 04:05 AM    TROPONINI 0.09 (H) 12/21/2018 08:54 AM             Cardiac Imaging and Procedures Review  Rhythm: 12/27/2018 normal sinus rhythm, rate 95 reviewed by myself.    ECHOCARDIOGRAM 12/21/2018  No prior study is available for comparison.   Left ventricle is small in size. Severe concentric left ventricular   hypertrophy. Hyperdynamic left ventricular systolic function. Left   ventricular ejection fraction is visually estimated to be greater than   75%. Abnormal septal motion consistent with right ventricular (RV)   volume overload and/or elevated RV end-diastolic pressure.   Indeterminate diastolic function. Near mid cavity obstruction.  Severely dilated right ventricle.  Right ventricular systolic pressure is estimated to be 80 mmHg.     CTA chest:  1.  No central or segmental pulmonary embolus  2.  Emphysema  3.  Bibasilar atelectasis with superimposed pneumonia not excluded  4.  Small to moderate RIGHT pleural effusion  5.  Small LEFT pleural effusion  6.  Interstitial pulmonary edema or possibly chronic interstitial lung disease such as IPF/UIP.  7.  Ascites  8.  Findings suggestive of pulmonary arterial hypertension  9.  Small pericardial effusion    Imaging  Chest  X-Ray: 12/24/2018 interstitial fibrosis and edema with effusion improved from 12/20/2018.  Reviewed by myself.    Assessment/Plan  Impressions:  1.    Acute respiratory failure with evidence of interstitial and emphysematous lung disease with bilateral pleural effusions.  Improving.  2.   Pulmonary hypertension severe with severe right heart failure.  3.  Interstitial lung disease.  Unclear etiology.  4.  Emphysema.  5.  Chronic tobacco use.    6.  Diastolic heart failure concern for infiltrative cardiomyopathy versus secondary diastolic dysfunction/failure related to severe pulmonary hypertension.  7.  Poly-clonal gammopathy, nonspecific    Recommendations and Discussion:  1.  Clinically the patient continues to improve, tolerating IV diuresis.  2.  Continue IV diuresis  3.  Appreciate Dr. Ginna VASQUEZ evaluation, further workup pending.  6.  Transthyretin results pending discussed status with laboratory.  7.  Cardiac MRI when clinically stable.  8.  Right and left heart cardiac catheterization when clinically improved.  9.  Also discussed with the patient regarding possible abdominal fat biopsy.    Reviewed with patient and discussed with RN.    Please contact me with any questions.    Miguel Newby M.D.   Cardiologist, [facility]  [phone]

## 2018-12-28 ENCOUNTER — APPOINTMENT (OUTPATIENT)
Dept: RADIOLOGY | Facility: MEDICAL CENTER | Age: 58
DRG: 286 | End: 2018-12-28
Attending: INTERNAL MEDICINE
Payer: MEDICAID

## 2018-12-28 LAB
A FUMIGATUS1 AB SER QL ID: NORMAL
A FUMIGATUS6 AB SER QL ID: NORMAL
A PULLULANS AB SER QL ID: NORMAL
ALBUMIN SERPL-MCNC: 2.93 G/DL (ref 3.75–5.01)
ALPHA1 GLOB SERPL ELPH-MCNC: 0.44 G/DL (ref 0.19–0.46)
ALPHA2 GLOB SERPL ELPH-MCNC: 0.66 G/DL (ref 0.48–1.05)
ANION GAP SERPL CALC-SCNC: 5 MMOL/L (ref 0–11.9)
B-GLOBULIN SERPL ELPH-MCNC: 0.88 G/DL (ref 0.48–1.1)
BUN SERPL-MCNC: 26 MG/DL (ref 8–22)
CALCIUM SERPL-MCNC: 9.1 MG/DL (ref 8.5–10.5)
CHLORIDE SERPL-SCNC: 96 MMOL/L (ref 96–112)
CO2 SERPL-SCNC: 38 MMOL/L (ref 20–33)
CREAT SERPL-MCNC: 0.73 MG/DL (ref 0.5–1.4)
GAMMA GLOB SERPL ELPH-MCNC: 0.99 G/DL (ref 0.62–1.51)
GLUCOSE SERPL-MCNC: 114 MG/DL (ref 65–99)
INTERPRETATION SERPL IFE-IMP: ABNORMAL
MONOCLON BAND OBS SERPL: ABNORMAL
PATHOLOGY STUDY: ABNORMAL
PIGEON SERUM AB QL ID: NORMAL
POTASSIUM SERPL-SCNC: 4 MMOL/L (ref 3.6–5.5)
PROT SERPL-MCNC: 5.9 G/DL (ref 6–8.3)
S RECTIVIRGULA AB SER QL ID: NORMAL
SODIUM SERPL-SCNC: 139 MMOL/L (ref 135–145)
T VULGARIS AB SER QL ID: NORMAL

## 2018-12-28 PROCEDURE — A9270 NON-COVERED ITEM OR SERVICE: HCPCS | Performed by: HOSPITALIST

## 2018-12-28 PROCEDURE — 700102 HCHG RX REV CODE 250 W/ 637 OVERRIDE(OP): Performed by: HOSPITALIST

## 2018-12-28 PROCEDURE — A9585 GADOBUTROL INJECTION: HCPCS | Performed by: INTERNAL MEDICINE

## 2018-12-28 PROCEDURE — 80048 BASIC METABOLIC PNL TOTAL CA: CPT

## 2018-12-28 PROCEDURE — 99232 SBSQ HOSP IP/OBS MODERATE 35: CPT | Performed by: INTERNAL MEDICINE

## 2018-12-28 PROCEDURE — 75561 CARDIAC MRI FOR MORPH W/DYE: CPT

## 2018-12-28 PROCEDURE — 700117 HCHG RX CONTRAST REV CODE 255: Performed by: INTERNAL MEDICINE

## 2018-12-28 PROCEDURE — 36415 COLL VENOUS BLD VENIPUNCTURE: CPT

## 2018-12-28 PROCEDURE — 700111 HCHG RX REV CODE 636 W/ 250 OVERRIDE (IP): Performed by: INTERNAL MEDICINE

## 2018-12-28 PROCEDURE — 770020 HCHG ROOM/CARE - TELE (206)

## 2018-12-28 RX ORDER — GADOBUTROL 604.72 MG/ML
15 INJECTION INTRAVENOUS ONCE
Status: COMPLETED | OUTPATIENT
Start: 2018-12-28 | End: 2018-12-28

## 2018-12-28 RX ADMIN — FUROSEMIDE 40 MG: 10 INJECTION, SOLUTION INTRAMUSCULAR; INTRAVENOUS at 04:52

## 2018-12-28 RX ADMIN — OMEGA-3 FATTY ACIDS CAP 1000 MG 1000 MG: 1000 CAP at 04:53

## 2018-12-28 RX ADMIN — OMEGA-3 FATTY ACIDS CAP 1000 MG 1000 MG: 1000 CAP at 12:16

## 2018-12-28 RX ADMIN — GADOBUTROL 15 ML: 604.72 INJECTION INTRAVENOUS at 12:24

## 2018-12-28 RX ADMIN — ENOXAPARIN SODIUM 40 MG: 100 INJECTION SUBCUTANEOUS at 04:52

## 2018-12-28 RX ADMIN — OMEGA-3 FATTY ACIDS CAP 1000 MG 1000 MG: 1000 CAP at 17:02

## 2018-12-28 ASSESSMENT — PAIN SCALES - GENERAL
PAINLEVEL_OUTOF10: 0

## 2018-12-28 ASSESSMENT — ENCOUNTER SYMPTOMS
BRUISES/BLEEDS EASILY: 0
NERVOUS/ANXIOUS: 0
FEVER: 0
ORTHOPNEA: 0
BACK PAIN: 0
POLYDIPSIA: 0
COUGH: 0
TINGLING: 0
CHILLS: 0
BLOOD IN STOOL: 0
VOMITING: 0
CONSTIPATION: 0
HEADACHES: 0
DEPRESSION: 0
BLURRED VISION: 0
ABDOMINAL PAIN: 0
PALPITATIONS: 0
NECK PAIN: 0
SPUTUM PRODUCTION: 0
WEIGHT LOSS: 0
SHORTNESS OF BREATH: 0
SPEECH CHANGE: 0
SORE THROAT: 0
NAUSEA: 0
PHOTOPHOBIA: 0
CHEST TIGHTNESS: 0
DOUBLE VISION: 0

## 2018-12-28 NOTE — PROGRESS NOTES
Assumed care at 0700. Bedside report received from Suzanne. Patient's chart and MAR reviewed. Pt denies pain at this time. Pt is A & O 4. Patient was updated on plan of care for the day. Questions answered and concerns addressed.  Pt denies any additional needs at this time. White board updated. Call light, phone and personal belongings within reach.

## 2018-12-28 NOTE — PROGRESS NOTES
Received report from DOLORES Anderson. Patient at side of bed sitting up eating meal tray. He is alert and oriented x4 with no complaints of pain or discomfort.

## 2018-12-28 NOTE — PROGRESS NOTES
Hospital Medicine Daily Progress Note    Date of Service  12/27/2018    Chief Complaint  SHort of breath    Hospital Course    58 y.o. male admitted 12/20/2018 with shortness of breath and increasing leg swelling over the prior 4 weeks.  He required BiPAP therapy in ICU       Interval Problem Update    Patient upset by the size of the lunch portion.  Requested additional portion of meatball and rice.  Lower extremity edema seemingly improving  Acute respiratory failure: On 4 L.  Will need to wean down as tolerated  Pleural effusion: Not enough to drain  Heart amyloidosis?:  MRI of the heart pending      POC discussed with RN, case management, the patient    Consultants/Specialty  Pulmonary  Cardiology  Oncology  Code Status  FULL    Disposition  Telemetry    Review of Systems  Review of Systems   Constitutional: Negative for chills, fever and weight loss.   HENT: Negative for congestion, hearing loss and sore throat.    Eyes: Negative for blurred vision, double vision and photophobia.   Respiratory: Positive for shortness of breath. Negative for cough and sputum production.    Cardiovascular: Positive for leg swelling. Negative for chest pain, palpitations and orthopnea.   Gastrointestinal: Negative for abdominal pain, blood in stool, constipation, melena, nausea and vomiting.   Genitourinary: Negative for dysuria and hematuria.   Musculoskeletal: Negative for back pain and neck pain.   Skin: Negative for itching and rash.   Neurological: Negative for tingling, speech change and headaches.   Endo/Heme/Allergies: Negative for polydipsia. Does not bruise/bleed easily.   Psychiatric/Behavioral: Negative for depression and suicidal ideas. The patient is not nervous/anxious.    Shortness of breath and leg swelling improved    Physical Exam  Temp:  [36.3 °C (97.3 °F)-37.2 °C (99 °F)] 37 °C (98.6 °F)  Pulse:  [] 93  Resp:  [15-18] 15  BP: ()/(61-82) 117/80    Physical Exam   Constitutional: He is oriented to  person, place, and time. He appears well-developed and well-nourished. No distress.   HENT:   Head: Normocephalic and atraumatic.   Nose: Nose normal.   Mouth/Throat: Oropharynx is clear and moist.   Eyes: Conjunctivae and EOM are normal. Right eye exhibits no discharge. Left eye exhibits no discharge. No scleral icterus.   Neck: No tracheal deviation present.   Cardiovascular: Normal rate, regular rhythm, normal heart sounds and intact distal pulses.    No murmur heard.  Pulmonary/Chest: Effort normal. No stridor. No respiratory distress. He has no wheezes. He has rales.   Abdominal: Soft. Bowel sounds are normal. He exhibits no distension. There is no tenderness.   Musculoskeletal: He exhibits edema (decreased).   Lymphadenopathy:     He has no cervical adenopathy.   Neurological: He is alert and oriented to person, place, and time. No cranial nerve deficit.   Skin: Skin is warm. He is not diaphoretic.   Psychiatric: He has a normal mood and affect. His behavior is normal. Thought content normal.   Vitals reviewed.  Nasal cannula 4 L    Fluids    Intake/Output Summary (Last 24 hours) at 12/27/18 1829  Last data filed at 12/27/18 1709   Gross per 24 hour   Intake             1338 ml   Output             1500 ml   Net             -162 ml       Laboratory  Recent Labs      12/25/18   0323   WBC  12.3*   RBC  4.71   HEMOGLOBIN  14.4   HEMATOCRIT  43.2   MCV  91.7   MCH  30.6   MCHC  33.3*   RDW  42.8   PLATELETCT  269   MPV  9.3     Recent Labs      12/25/18   0323  12/26/18   0413   SODIUM  140  137   POTASSIUM  4.1  4.5   CHLORIDE  100  102   CO2  40*  35*   GLUCOSE  116*  110*   BUN  20  21   CREATININE  0.90  0.81   CALCIUM  9.1  9.0                   Imaging  US-CHEST   Final Result      No significant right effusion. Not enough fluid to drain.      US-EXTREMITY VENOUS LOWER UNILAT LEFT   Final Result      DX-CHEST-LIMITED (1 VIEW)   Final Result      Improving bibasilar interstitial edema pattern. Unchanged  cardiomegaly and small bilateral pleural effusions.      CT-CTA CHEST PULMONARY ARTERY W/ RECONS   Final Result      1.  No central or segmental pulmonary embolus   2.  Emphysema   3.  Bibasilar atelectasis with superimposed pneumonia not excluded   4.  Small to moderate RIGHT pleural effusion   5.  Small LEFT pleural effusion   6.  Interstitial pulmonary edema or possibly chronic interstitial lung disease such as IPF/UIP.   7.  Ascites   8.  Findings suggestive of pulmonary arterial hypertension   9.  Small pericardial effusion         EC-ECHOCARDIOGRAM COMPLETE W/O CONT   Final Result      DX-CHEST-PORTABLE (1 VIEW)   Final Result         1. Cardiomegaly and bibasilar interstitial/alveolar opacities, right worse than left. They likely represent consolidations and/or edema. Multifocal pneumonia can be considered in the appropriate clinical settings.   2. Small right pleural effusion.      US-EXTREMITY VENOUS LOWER UNILAT RIGHT    (Results Pending)   MR-CARDIAC MORPH/FUNC WITH & W/O    (Results Pending)        Assessment/Plan  * Acute respiratory failure with hypoxia and hypercapnia (HCC)   Assessment & Plan    Concern of cardiomyopathy with restriction from hypertrophy  [place] decline acceptance  Diuresing and monitor strict I/O's  Monitor vitals, labs  On BiPAP just overnight as of 12/22  On nasal canula 12/23 am.  Rt protocol  Pulmonary consulting     Abnormal CT scan of lung   Assessment & Plan    12/21 with emphysema and possible ILD findings; bilateral pleural effusion  Pulmonary following.  Will need PFT as outpatient       GI bleed   Assessment & Plan    Bloody mucus-like per RN  No sign of hemorrhoid on external exam 12/23.  Possible internal hemorrhoids. Start Anusol cream  Needs future colonoscopy once stable, outpatient  Monitor Hgb  12/21 Hgb:12.6  12/22 Hgb:13.7  12/23 Hgb: 14.5 appears to be heme concentrating with diuresis  INR 1.1  Hemoglobin remained stable     Pulmonary hypertension (HCC)    Assessment & Plan    RVSP: 80mmHg  CTA negative for PE  Supplemental oxygen  Cardiology consulted  Will need heart catheterization     Acute on chronic right-sided heart failure (HCC)   Assessment & Plan    Cardiology consulting.  Diuresis, strict I/O's, monitor labs  IMproving peripheral edema.  12/24 Total output since admit: -2.1L  Checking CXR as has ongoing decreased breath sounds right lung on exam  12/21 Echo: Left ventricle is small in size. Severe concentric left ventricular   hypertrophy. Hyperdynamic left ventricular systolic function. Left   ventricular ejection fraction is visually estimated to be greater than   75%. Abnormal septal motion consistent with right ventricular (RV)   volume overload and/or elevated RV end-diastolic pressure.   Indeterminate diastolic function. Near mid cavity obstruction.  Severely dilated right ventricle.  Right ventricular systolic pressure is estimated to be 80 mmHg.  Pulmonary hypertension probably secondary to pulmonary disease.  Heart catheterization considered while in the hospital  Diastolic heart failure: Concern for amyloidosis. transthyretin amyloidosis evaluation and MRI of the heart is pending     Elevated troponin   Assessment & Plan    C/O CHF as etiology  monitor     Hypercapnia   Assessment & Plan    On BiPAP last night  On nasal canula 12/23am  Monitor abg  Pulmonary consulted     Pleural effusion, bilateral   Assessment & Plan    Not enough fluid to drain     Hypotension   Assessment & Plan    Resolved          VTE prophylaxis: SCD

## 2018-12-28 NOTE — PROGRESS NOTES
Pulmonary Progress Note     Date of admission  12/20/2018    HISTORY OF PRESENT ILLNESS:  This is a 58-year-old  is a male who reports no   past medical history, smokes 3-5 cigarettes on a daily basis, presented to the   hospital on 12/20/2018 with complaints of 2 weeks of increasing dyspnea on   exertion, generalized fatigue, 4 weeks of increased lower extremity swelling   and abdominal distention.  Denies any fever, chills, sweats.  Denies any   active chest pain, no syncopal episodes, lightheadedness.  Denies any nausea,   vomiting, abdominal pain.  No diarrhea or dysuria.  Denies any recent travel.    Denies any history of clotting disorders.  Denies any stimulant utilization   or drug use. Patient was initially admitted to telemetry being worked up for   congestive heart failure given a BNP of greater than 1000 and hypoxia;   however, he continued to decline requiring BiPAP therapy and subsequently  transferred to the intensive care unit.     12/25/18: patient transferred out of ICU. Admits improvemnt in symptoms of   dyspnea and mild dependent edema. Denies productive cough or expectoration   no symptoms of reflux.  He denies any use of stimulants such as cocaine or   Methamphetamine. Occasionally uses marijuana. 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   Prior to that had been working as .    12/26/18: Denies any complaints today. Reports improvement in sob. SpO2   above the 90 on O2 supplementation @ 5 LPM. On diuretics. Net fluid balance   is -2300 over the past 24hrs.     12/27/18: Clinically stable and symptomatically improved. O2 supplementation   @4 LPM via NC. Scheduled for thoracentesis today.    12/28/18: Remains clinically stable. S/p thoracentesis yesterday but not enough fluid to drain and thus couldn't be sent for analysis.      Review of Systems  Review of Systems   Constitutional: Negative for chills, diaphoresis and  fever.   HENT: Negative for ear pain, nosebleeds, sinus pain and tinnitus.    Eyes: Negative for blurred vision, double vision and photophobia.   Respiratory: Negative for hemoptysis and stridor. No dyspnea or cough  Cardiovascular: Positive for leg swelling. Negative for chest pain, palpitations   and orthopnea.   Gastrointestinal: Negative for abdominal pain, constipation, nausea and vomiting.   Genitourinary: Negative for dysuria, frequency, hematuria and urgency.   Musculoskeletal: Negative for back pain, myalgias and neck pain.   Skin: Negative for rash.   Neurological: Negative for sensory change, speech change, focal weakness,   seizures and headaches.   Endo/Heme/Allergies: Does not bruise/bleed easily.   Psychiatric/Behavioral: Negative for depression, hallucinations, substance abuse   and suicidal ideas.         Vital Signs    Temp: 36.9 °C (98 °F)  Pulse:  92  Resp:  16  BP: 135/89  SpO2: 90% on O2 @ 4LPM via NC     Physical Exam   Physical Exam   Constitutional: He is oriented to person, place, and time. No respiratory distress.   HENT:   Head: Normocephalic and atraumatic.   Right Ear: External ear normal.   Left Ear: External ear normal.   Nose: Nose normal.   Mouth/Throat: Oropharynx is clear and moist.   Eyes: Pupils are equal, round, and reactive to light. Conjunctivae are normal.  Neck: Normal range of motion. Neck supple. No JVD present. No tracheal deviation   present.   Cardiovascular: Intact distal pulses.  Heart sounds normal No murmur heard.  Pulmonary/Chest: No stridor. No respiratory distress. He has no wheezes.   Diminished breath sounds at bases bilaterally  Abdominal: Soft. Bowel sounds are normal. He exhibits no distension. There is   no tenderness.   There is no rebound.   Musculoskeletal: He exhibits 1+ edema. He exhibits no tenderness or deformity.   No clubbing or cyanosis   Neurological: He is alert and oriented to person, place, and time. No cranial nerve deficit.   Coordination  normal. No focal weakness   Skin: Skin is warm and dry. No rash noted. He is not diaphoretic. No erythema.      Medications  Furosemide  Fish oil capsule  Hydrocortisone suppository  heleen-docusate     Fluids     Intake/Output Summary (Last 24 hours) at 12/25/18      Gross per 24 hour   Intake             2148 ml   Output             3250 ml   Net            -1102 ml         Laboratory        Recent Labs      12/21/18   0552  12/21/18   0759  12/21/18   1224   [id number]  7.28*  7.34*  7.31*   [id number]  68.3*  59.1*  60.3*   [id number]  70.7  69.0  68.4   [id number]  91.6*  92.8*  92.1*   ARTHCO3  31*  31*  30*   [id number]  5.0  5.0  5.0   ARTBE  2  4*  2       Results for DINA CROUCH (MRN [id number]) as of 12/25/2018 11:45   Ref. Range 12/25/2018 03:23   WBC Latest Ref Range: 4.8 - 10.8 K/uL 12.3 (H)   RBC Latest Ref Range: 4.70 - 6.10 M/uL 4.71   Hemoglobin Latest Ref Range: 14.0 - 18.0 g/dL 14.4   Hematocrit Latest Ref Range: 42.0 - 52.0 % 43.2   MCV Latest Ref Range: 81.4 - 97.8 fL 91.7   MCH Latest Ref Range: 27.0 - 33.0 pg 30.6   MCHC Latest Ref Range: 33.7 - 35.3 g/dL 33.3 (L)   RDW Latest Ref Range: 35.9 - 50.0 fL 42.8   Platelet Count Latest Ref Range: 164 - 446 K/uL 269   MPV Latest Ref Range: 9.0 - 12.9 fL 9.3   Sodium Latest Ref Range: 135 - 145 mmol/L 140   Potassium Latest Ref Range: 3.6 - 5.5 mmol/L 4.1   Chloride Latest Ref Range: 96 - 112 mmol/L 100   Co2 Latest Ref Range: 20 - 33 mmol/L 40 (H)   Anion Gap Latest Ref Range: 0.0 - 11.9  0.0   Glucose Latest Ref Range: 65 - 99 mg/dL 116 (H)   Bun Latest Ref Range: 8 - 22 mg/dL 20   Creatinine Latest Ref Range: 0.50 - 1.40 mg/dL 0.90   GFR If  Latest Ref Range: >60 mL/min/1.73 m 2 >60   GFR If Non  Latest Ref Range: >60 mL/min/1.73 m 2 >60   Calcium Latest Ref Range: 8.5 - 10.5 mg/dL 9.1   Magnesium Latest Ref Range: 1.5 - 2.5 mg/dL 2.1     Imaging  X-Ray:  I have personally reviewed the images and compared with prior  images.    Assessment/Plan    # Acute Hypoxic hypercapnic respiratory failure  # Severe pulmonary HTN secondary to cardiac and pulmonary etiology  # Pulmonary fibrosis/ ILD  # Bilateral pleural effusions  # Cadiomyopathy    - Denies sob today. Saturating in the 90s on 4LPM  - Taper O2 as tolerated to maintain for SpO2 > 90%  - Continue diuresis for severe pulmonary hypertension    -  Ultra sound chest shows not enough fluid that could be safely aspirated.  - CTA excluded PE- no indication for anticoagulation, but shows mild emphysema and ILD  - Patient will need right heart cath, complete LFTs as well as further workup of ILD once     cardiopulmonary status improves.  - Patient scheduled for a cardiac MRI today to r/o amyloidosis  - We will continue to follow

## 2018-12-28 NOTE — PROGRESS NOTES
Hospital Medicine Daily Progress Note    Date of Service  12/28/2018    Chief Complaint  SHort of breath    Hospital Course    58 y.o. male admitted 12/20/2018 with shortness of breath and increasing leg swelling over the prior 4 weeks.  He required BiPAP therapy in ICU       Interval Problem Update  Up to chair drinking a beverage w/o signs of distress  VSS   SPEP w/o spikes  Lower extremity edema improving  Acute respiratory failure: down to  3 L.    wean down as tolerated  Ambulate down the yuan at least twice daily. IS    Heart amyloidosis?:  MRI of the heart pending      POC discussed with RN, case management, the patient    Consultants/Specialty  Pulmonary  Cardiology  Oncology  Code Status  FULL    Disposition  Telemetry    Review of Systems  Review of Systems   Constitutional: Negative for chills, fever and weight loss.   HENT: Negative for congestion, hearing loss and sore throat.    Eyes: Negative for blurred vision, double vision and photophobia.   Respiratory: Negative for cough, sputum production and shortness of breath.    Cardiovascular: Positive for leg swelling. Negative for chest pain, palpitations and orthopnea.   Gastrointestinal: Negative for abdominal pain, blood in stool, constipation, melena, nausea and vomiting.   Genitourinary: Negative for dysuria and hematuria.   Musculoskeletal: Negative for back pain and neck pain.   Skin: Negative for itching and rash.   Neurological: Negative for tingling, speech change and headaches.   Endo/Heme/Allergies: Negative for polydipsia. Does not bruise/bleed easily.   Psychiatric/Behavioral: Negative for depression and suicidal ideas. The patient is not nervous/anxious.    Shortness of breath and leg swelling improving    Physical Exam  Temp:  [36.3 °C (97.3 °F)-37.7 °C (99.9 °F)] 37.2 °C (99 °F)  Pulse:  [] 78  Resp:  [15-18] 16  BP: ()/(62-84) 108/78    Physical Exam   Constitutional: He is oriented to person, place, and time. He appears  well-developed and well-nourished. No distress.   HENT:   Head: Normocephalic and atraumatic.   Nose: Nose normal.   Mouth/Throat: Oropharynx is clear and moist.   Eyes: Conjunctivae and EOM are normal. Right eye exhibits no discharge. Left eye exhibits no discharge. No scleral icterus.   Neck: No tracheal deviation present.   Cardiovascular: Normal rate, regular rhythm, normal heart sounds and intact distal pulses.    No murmur heard.  Pulmonary/Chest: Effort normal. No stridor. No respiratory distress. He has no wheezes. He has rales.   Abdominal: Soft. Bowel sounds are normal. He exhibits no distension. There is no tenderness.   Musculoskeletal: He exhibits edema (decreased).   Lymphadenopathy:     He has no cervical adenopathy.   Neurological: He is alert and oriented to person, place, and time. No cranial nerve deficit.   Skin: Skin is warm. He is not diaphoretic.   Psychiatric: He has a normal mood and affect. His behavior is normal. Thought content normal.   Vitals reviewed.  Nasal cannula 3 L    Fluids    Intake/Output Summary (Last 24 hours) at 12/28/18 1008  Last data filed at 12/28/18 0800   Gross per 24 hour   Intake             1188 ml   Output             1225 ml   Net              -37 ml       Laboratory      Recent Labs      12/26/18   0413   SODIUM  137   POTASSIUM  4.5   CHLORIDE  102   CO2  35*   GLUCOSE  110*   BUN  21   CREATININE  0.81   CALCIUM  9.0                   Imaging  US-CHEST   Final Result      No significant right effusion. Not enough fluid to drain.      US-EXTREMITY VENOUS LOWER UNILAT LEFT   Final Result      DX-CHEST-LIMITED (1 VIEW)   Final Result      Improving bibasilar interstitial edema pattern. Unchanged cardiomegaly and small bilateral pleural effusions.      CT-CTA CHEST PULMONARY ARTERY W/ RECONS   Final Result      1.  No central or segmental pulmonary embolus   2.  Emphysema   3.  Bibasilar atelectasis with superimposed pneumonia not excluded   4.  Small to moderate  RIGHT pleural effusion   5.  Small LEFT pleural effusion   6.  Interstitial pulmonary edema or possibly chronic interstitial lung disease such as IPF/UIP.   7.  Ascites   8.  Findings suggestive of pulmonary arterial hypertension   9.  Small pericardial effusion         EC-ECHOCARDIOGRAM COMPLETE W/O CONT   Final Result      DX-CHEST-PORTABLE (1 VIEW)   Final Result         1. Cardiomegaly and bibasilar interstitial/alveolar opacities, right worse than left. They likely represent consolidations and/or edema. Multifocal pneumonia can be considered in the appropriate clinical settings.   2. Small right pleural effusion.      US-EXTREMITY VENOUS LOWER UNILAT RIGHT    (Results Pending)   MR-CARDIAC MORPH/FUNC WITH & W/O    (Results Pending)        Assessment/Plan  * Acute respiratory failure with hypoxia and hypercapnia (HCC)   Assessment & Plan    Concern of cardiomyopathy with restriction from hypertrophy  [place] decline acceptance  Diuresing and monitor strict I/O's  Monitor vitals, labs  On BiPAP just overnight as of 12/22  On nasal canula 12/23 am.  Rt protocol  Pulmonary consulting     Abnormal CT scan of lung   Assessment & Plan    12/21 with emphysema and possible ILD findings; bilateral pleural effusion  Pulmonary following.  Will need PFT as outpatient       GI bleed   Assessment & Plan    Bloody mucus-like per RN  No sign of hemorrhoid on external exam 12/23.  Possible internal hemorrhoids. Start Anusol cream  Needs future colonoscopy once stable, outpatient  Monitor Hgb  12/21 Hgb:12.6  12/22 Hgb:13.7  12/23 Hgb: 14.5 appears to be heme concentrating with diuresis  INR 1.1  Hemoglobin remained stable     Pulmonary hypertension (HCC)   Assessment & Plan    RVSP: 80mmHg  CTA negative for PE  Supplemental oxygen  Cardiology consulted  Will need heart catheterization     Acute on chronic right-sided heart failure (HCC)   Assessment & Plan    Cardiology consulting.  Diuresis, strict I/O's, monitor  labs  IMproving peripheral edema.  12/24 Total output since admit: -2.1L  Checking CXR as has ongoing decreased breath sounds right lung on exam  12/21 Echo: Left ventricle is small in size. Severe concentric left ventricular   hypertrophy. Hyperdynamic left ventricular systolic function. Left   ventricular ejection fraction is visually estimated to be greater than   75%. Abnormal septal motion consistent with right ventricular (RV)   volume overload and/or elevated RV end-diastolic pressure.   Indeterminate diastolic function. Near mid cavity obstruction.  Severely dilated right ventricle.  Right ventricular systolic pressure is estimated to be 80 mmHg.  Pulmonary hypertension probably secondary to pulmonary disease.  Heart catheterization considered while in the hospital  Diastolic heart failure: Concern for amyloidosis. transthyretin amyloidosis evaluation and MRI of the heart is pending  UPEP with polyclonal spikes,  SPEP w/o spikes. Hematology consulted     Elevated troponin   Assessment & Plan    C/O CHF as etiology  monitor     Hypercapnia   Assessment & Plan    On BiPAP last night  On nasal canula 12/23am  Monitor abg  Pulmonary consulted     Pleural effusion, bilateral   Assessment & Plan    Not enough fluid to drain     Hypotension   Assessment & Plan    Resolved          VTE prophylaxis: SCD

## 2018-12-28 NOTE — PROGRESS NOTES
Cardiology Follow Up Progress Note    Date of Service  12/28/2018    Attending Physician  Cheng Bailey D.O.    Chief Complaint   Shortness of breath and edema.    HPI  Alexis Georges is a 58 y.o. male admitted 12/20/2018 with shortness of breath and lower extremity edema.    Interim Events   12/24: Shortness of breath and edema improved.  Urine output 2050 cc  12/25: Shortness of breath and edema continues to improve.  Urine output 3250 cc feels better.  Smoked a few cigarettes intermittently.  12/26: No cardiac symptoms.  Overall feeling better.  Continues to tolerate diuresis.  Urine output 3250 cc.  12/27: No new cardiac symptoms.  Continues to feel better with diuresis and less edema.  Urine output 3375 cc.  12/28: No cardiac symptoms.  Continues to feel better. Urine output incompletely documented measured.  Insufficient fluid to perform thoracentesis yesterday.    Review of Systems  Review of Systems   Respiratory: Negative for cough and chest tightness.    Cardiovascular: Negative for chest pain, palpitations and leg swelling.     Vital signs in last 24 hours  Temp:  [36.3 °C (97.3 °F)-37.7 °C (99.9 °F)] 37.2 °C (99 °F)  Pulse:  [] 78  Resp:  [15-18] 16  BP: ()/(62-84) 108/78    Physical Exam  Physical Exam   Constitutional: He is oriented to person, place, and time. He appears well-nourished. No distress.   HENT:   Head: Normocephalic.   Neck: No JVD present.       Cardiovascular: Normal rate, regular rhythm, S1 normal, S2 normal, normal heart sounds and intact distal pulses.  Exam reveals no gallop and no friction rub.    No murmur heard.  Pulmonary/Chest: Effort normal. He has no wheezes. He has no rhonchi. He has no rales.   Decreased rhonchi.   Musculoskeletal: He exhibits edema.   Minimal edema.   Neurological: He is alert and oriented to person, place, and time.   Skin: Skin is warm and dry.   Psychiatric: He has a normal mood and affect. His behavior is normal.       Lab Review  Lab  Results   Component Value Date/Time    WBC 12.3 (H) 12/25/2018 03:23 AM    RBC 4.71 12/25/2018 03:23 AM    HEMOGLOBIN 14.4 12/25/2018 03:23 AM    HEMATOCRIT 43.2 12/25/2018 03:23 AM    MCV 91.7 12/25/2018 03:23 AM    MCH 30.6 12/25/2018 03:23 AM    MCHC 33.3 (L) 12/25/2018 03:23 AM    MPV 9.3 12/25/2018 03:23 AM      Lab Results   Component Value Date/Time    SODIUM 137 12/26/2018 04:13 AM    POTASSIUM 4.5 12/26/2018 04:13 AM    CHLORIDE 102 12/26/2018 04:13 AM    CO2 35 (H) 12/26/2018 04:13 AM    GLUCOSE 110 (H) 12/26/2018 04:13 AM    BUN 21 12/26/2018 04:13 AM    CREATININE 0.81 12/26/2018 04:13 AM      Lab Results   Component Value Date/Time    ASTSGOT 18 12/24/2018 04:30 AM    ALTSGPT 16 12/24/2018 04:30 AM     Lab Results   Component Value Date/Time    CHOLSTRLTOT 109 12/21/2018 04:05 AM    LDL 73 12/21/2018 04:05 AM    HDL 27 (A) 12/21/2018 04:05 AM    TRIGLYCERIDE 46 12/21/2018 04:05 AM    TROPONINI 0.09 (H) 12/21/2018 08:54 AM             Cardiac Imaging and Procedures Review  Rhythm: 12/28/2018 normal sinus rhythm, rate 97 reviewed by myself.    ECHOCARDIOGRAM 12/21/2018  No prior study is available for comparison.   Left ventricle is small in size. Severe concentric left ventricular   hypertrophy. Hyperdynamic left ventricular systolic function. Left   ventricular ejection fraction is visually estimated to be greater than   75%. Abnormal septal motion consistent with right ventricular (RV)   volume overload and/or elevated RV end-diastolic pressure.   Indeterminate diastolic function. Near mid cavity obstruction.  Severely dilated right ventricle.  Right ventricular systolic pressure is estimated to be 80 mmHg.     CTA chest:  1.  No central or segmental pulmonary embolus  2.  Emphysema  3.  Bibasilar atelectasis with superimposed pneumonia not excluded  4.  Small to moderate RIGHT pleural effusion  5.  Small LEFT pleural effusion  6.  Interstitial pulmonary edema or possibly chronic interstitial lung  disease such as IPF/UIP.  7.  Ascites  8.  Findings suggestive of pulmonary arterial hypertension  9.  Small pericardial effusion    Imaging  Chest X-Ray: 12/24/2018 interstitial fibrosis and edema with effusion improved from 12/20/2018.  Reviewed by myself.    Assessment/Plan  Impressions:  1.    Acute respiratory failure with evidence of interstitial and emphysematous lung disease with bilateral pleural effusions.  Improving.  2.   Pulmonary hypertension severe with severe right heart failure.  Resolving.  3.  Interstitial lung disease.  Unclear etiology.  4.  Emphysema.  5.  Chronic tobacco use.    6.  Diastolic heart failure concern for infiltrative cardiomyopathy versus secondary diastolic dysfunction/failure related to severe pulmonary hypertension.  7.  Poly-clonal gammopathy, nonspecific    Recommendations and Discussion:  1.  Clinically the patient continues to improve, tolerating IV diuresis.  2.  Discontinue IV fluids.  3.  Appreciate Dr. Chacko III evaluation, further workup pending.  6.  Transthyretin results pending discussed status with laboratory.  7.  Cardiac MRI today.  8.  Right and left heart cardiac catheterization when clinically improved.  9.  Also discussed with the patient regarding possible abdominal fat biopsy.  10.  Follow-up BMP.    Reviewed with patient and discussed with RN.    Please contact me with any questions.    Miguel Newby M.D.   Cardiologist, [facility]  [phone] - [phone]

## 2018-12-28 NOTE — CARE PLAN
Problem: Fluid Volume:  Goal: Will maintain balanced intake and output  Outcome: PROGRESSING AS EXPECTED  Patient continues to ask for food/fluid that places him outside his 2Liter fluid and 2gm NA diet. Continue education on why these parameters in place.

## 2018-12-29 LAB
ANION GAP SERPL CALC-SCNC: 5 MMOL/L (ref 0–11.9)
BUN SERPL-MCNC: 28 MG/DL (ref 8–22)
CALCIUM SERPL-MCNC: 8.8 MG/DL (ref 8.5–10.5)
CHLORIDE SERPL-SCNC: 99 MMOL/L (ref 96–112)
CO2 SERPL-SCNC: 36 MMOL/L (ref 20–33)
CREAT SERPL-MCNC: 0.86 MG/DL (ref 0.5–1.4)
GLUCOSE SERPL-MCNC: 100 MG/DL (ref 65–99)
POTASSIUM SERPL-SCNC: 4.3 MMOL/L (ref 3.6–5.5)
SODIUM SERPL-SCNC: 140 MMOL/L (ref 135–145)

## 2018-12-29 PROCEDURE — 4A023N6 MEASUREMENT OF CARDIAC SAMPLING AND PRESSURE, RIGHT HEART, PERCUTANEOUS APPROACH: ICD-10-PCS | Performed by: INTERNAL MEDICINE

## 2018-12-29 PROCEDURE — 99024 POSTOP FOLLOW-UP VISIT: CPT | Performed by: INTERNAL MEDICINE

## 2018-12-29 PROCEDURE — 700111 HCHG RX REV CODE 636 W/ 250 OVERRIDE (IP)

## 2018-12-29 PROCEDURE — 361014 HCHG 6FR ARROW SWAN/THERMO

## 2018-12-29 PROCEDURE — 99153 MOD SED SAME PHYS/QHP EA: CPT

## 2018-12-29 PROCEDURE — 99233 SBSQ HOSP IP/OBS HIGH 50: CPT | Performed by: INTERNAL MEDICINE

## 2018-12-29 PROCEDURE — 36415 COLL VENOUS BLD VENIPUNCTURE: CPT

## 2018-12-29 PROCEDURE — 93451 RIGHT HEART CATH: CPT

## 2018-12-29 PROCEDURE — 93463 DRUG ADMIN & HEMODYNMIC MEAS: CPT | Performed by: INTERNAL MEDICINE

## 2018-12-29 PROCEDURE — 700101 HCHG RX REV CODE 250

## 2018-12-29 PROCEDURE — 770020 HCHG ROOM/CARE - TELE (206)

## 2018-12-29 PROCEDURE — 93451 RIGHT HEART CATH: CPT | Mod: 26 | Performed by: INTERNAL MEDICINE

## 2018-12-29 PROCEDURE — 700102 HCHG RX REV CODE 250 W/ 637 OVERRIDE(OP): Performed by: HOSPITALIST

## 2018-12-29 PROCEDURE — 99152 MOD SED SAME PHYS/QHP 5/>YRS: CPT | Performed by: INTERNAL MEDICINE

## 2018-12-29 PROCEDURE — 4A03353 MEASUREMENT OF ARTERIAL FLOW, PULMONARY, PERCUTANEOUS APPROACH: ICD-10-PCS | Performed by: INTERNAL MEDICINE

## 2018-12-29 PROCEDURE — 99152 MOD SED SAME PHYS/QHP 5/>YRS: CPT

## 2018-12-29 PROCEDURE — 99232 SBSQ HOSP IP/OBS MODERATE 35: CPT | Performed by: INTERNAL MEDICINE

## 2018-12-29 PROCEDURE — 80048 BASIC METABOLIC PNL TOTAL CA: CPT

## 2018-12-29 PROCEDURE — C1769 GUIDE WIRE: HCPCS

## 2018-12-29 PROCEDURE — C1894 INTRO/SHEATH, NON-LASER: HCPCS

## 2018-12-29 PROCEDURE — A9270 NON-COVERED ITEM OR SERVICE: HCPCS | Performed by: HOSPITALIST

## 2018-12-29 RX ORDER — LIDOCAINE HYDROCHLORIDE 20 MG/ML
INJECTION, SOLUTION INFILTRATION; PERINEURAL
Status: COMPLETED
Start: 2018-12-29 | End: 2018-12-29

## 2018-12-29 RX ORDER — HEPARIN SODIUM,PORCINE 1000/ML
VIAL (ML) INJECTION
Status: COMPLETED
Start: 2018-12-29 | End: 2018-12-29

## 2018-12-29 RX ORDER — MIDAZOLAM HYDROCHLORIDE 1 MG/ML
INJECTION INTRAMUSCULAR; INTRAVENOUS
Status: COMPLETED
Start: 2018-12-29 | End: 2018-12-29

## 2018-12-29 RX ORDER — ADENOSINE 3 MG/ML
INJECTION, SOLUTION INTRAVENOUS
Status: COMPLETED
Start: 2018-12-29 | End: 2018-12-29

## 2018-12-29 RX ADMIN — ADENOSINE 90 MG: 3 INJECTION, SOLUTION INTRAVENOUS at 09:12

## 2018-12-29 RX ADMIN — HEPARIN SODIUM 2000 UNITS: 200 INJECTION, SOLUTION INTRAVENOUS at 08:49

## 2018-12-29 RX ADMIN — OMEGA-3 FATTY ACIDS CAP 1000 MG 1000 MG: 1000 CAP at 11:25

## 2018-12-29 RX ADMIN — LIDOCAINE HYDROCHLORIDE: 20 INJECTION, SOLUTION INFILTRATION; PERINEURAL at 08:49

## 2018-12-29 RX ADMIN — OMEGA-3 FATTY ACIDS CAP 1000 MG 1000 MG: 1000 CAP at 18:06

## 2018-12-29 RX ADMIN — HEPARIN SODIUM: 1000 INJECTION, SOLUTION INTRAVENOUS; SUBCUTANEOUS at 08:49

## 2018-12-29 RX ADMIN — MIDAZOLAM HYDROCHLORIDE 1 MG: 1 INJECTION, SOLUTION INTRAMUSCULAR; INTRAVENOUS at 09:12

## 2018-12-29 RX ADMIN — FENTANYL CITRATE 50 MCG: 50 INJECTION, SOLUTION INTRAMUSCULAR; INTRAVENOUS at 09:12

## 2018-12-29 ASSESSMENT — PAIN SCALES - GENERAL
PAINLEVEL_OUTOF10: 0

## 2018-12-29 ASSESSMENT — LIFESTYLE VARIABLES: SUBSTANCE_ABUSE: 0

## 2018-12-29 ASSESSMENT — ENCOUNTER SYMPTOMS
ORTHOPNEA: 0
CHEST TIGHTNESS: 0
PALPITATIONS: 0
SPEECH CHANGE: 0
SHORTNESS OF BREATH: 0
BLOOD IN STOOL: 0
HEMOPTYSIS: 0
NERVOUS/ANXIOUS: 0
FEVER: 0
BRUISES/BLEEDS EASILY: 0
NAUSEA: 0
PHOTOPHOBIA: 0
BLURRED VISION: 0
POLYDIPSIA: 0
SORE THROAT: 0
COUGH: 0
ABDOMINAL PAIN: 0
DEPRESSION: 0
TINGLING: 0
DIZZINESS: 0
NECK PAIN: 0
HEARTBURN: 0
VOMITING: 0
MYALGIAS: 0
WEIGHT LOSS: 0
BACK PAIN: 0
CHILLS: 0
CONSTIPATION: 0
DOUBLE VISION: 0
HEADACHES: 0
SPUTUM PRODUCTION: 0

## 2018-12-29 NOTE — PROGRESS NOTES
No diet ordered. Pt back from right heart cath. Requesting food, per guicho Christensen to reorder previous diet.

## 2018-12-29 NOTE — PROGRESS NOTES
Patient back to floor. Tele box on and monitor room notified. MR: SR 90s. VSS. Pt requesting food.

## 2018-12-29 NOTE — PROGRESS NOTES
Oncology/Hematology Progress Note               Author: Sebastian Medinaemory VASQUEZ Date & Time created: 2018  1:24 PM   Concern for cardiac amyloidosis  Interval History:  Patient is doing better.  He has been diuresing well.  Swelling improving.  Shortness of breath improving.  Cardiac MRI not consistent with cardiac amyloidosis    Review of Systems:  Review of Systems   Constitutional: Negative for chills and fever.   Eyes: Negative for blurred vision, double vision and photophobia.   Respiratory: Negative for cough, hemoptysis and sputum production.    Cardiovascular: Negative for chest pain and palpitations.   Gastrointestinal: Negative for abdominal pain, heartburn and nausea.   Genitourinary: Negative for dysuria and urgency.   Musculoskeletal: Negative for myalgias and neck pain.   Skin: Negative for rash.   Neurological: Negative for dizziness, tingling and headaches.   Endo/Heme/Allergies: Negative for environmental allergies. Does not bruise/bleed easily.   Psychiatric/Behavioral: Negative for depression, substance abuse and suicidal ideas.       Physical Exam:  Physical Exam    Labs:          Recent Labs      18   1025  18   0240   SODIUM  139  140   POTASSIUM  4.0  4.3   CHLORIDE  96  99   CO2  38*  36*   BUN  26*  28*   CREATININE  0.73  0.86   CALCIUM  9.1  8.8     Recent Labs      18   1025  18   0240   GLUCOSE  114*  100*     No results for input(s): RBC, HEMOGLOBIN, HEMATOCRIT, PLATELETCT, PROTHROMBTM, APTT, INR, IRON, FERRITIN, TOTIRONBC in the last 72 hours.      Recent Labs      18   1025  18   0240   SODIUM  139  140   POTASSIUM  4.0  4.3   CHLORIDE  96  99   CO2  38*  36*   GLUCOSE  114*  100*   BUN  26*  28*   CREATININE  0.73  0.86   CALCIUM  9.1  8.8     Hemodynamics:  Temp (24hrs), Av.8 °C (98.2 °F), Min:36.4 °C (97.6 °F), Max:37.2 °C (99 °F)  Temperature: 37.2 °C (99 °F)  Pulse  Av  Min: 49  Max: 122  Blood Pressure: (!) 99/71     Respiratory:     Respiration: 18, Pulse Oximetry: 94 %        RUL Breath Sounds: Clear, RML Breath Sounds: Diminished, RLL Breath Sounds: Diminished, LEONARD Breath Sounds: Clear, LLL Breath Sounds: Diminished  Fluids:    Intake/Output Summary (Last 24 hours) at 12/29/18 1324  Last data filed at 12/29/18 0700   Gross per 24 hour   Intake              960 ml   Output              875 ml   Net               85 ml     Weight: 71.3 kg (157 lb 3 oz)  GI/Nutrition:  Orders Placed This Encounter   Procedures   • Diet Order 2 Gram Sodium     Standing Status:   Standing     Number of Occurrences:   1     Order Specific Question:   Diet:     Answer:   2 Gram Sodium [7]     Order Specific Question:   Consistency/Fluid modifications:     Answer:   2000 ml Fluid Restriction [11]     Medical Decision Making, by Problem:  Active Hospital Problems    Diagnosis   • *Acute respiratory failure with hypoxia and hypercapnia (HCC) [J96.01, J96.02]   • Abnormal CT scan of lung [R91.8]   • Pulmonary hypertension (HCC) [I27.20]   • GI bleed [K92.2]   • Acute on chronic right-sided heart failure (HCC) [I50.813]   • Elevated troponin [R74.8]   • Hypercapnia [R06.89]   • Pleural effusion, bilateral [J90]   • Hypotension [I95.9]       Plan:  1.  Concern for cardiac amyloidosis  -Urine protein consistent with polyclonal free light chain elevation.  SPEP unremarkable.  Transthyretin results pending  -Cardiac MRI not consistent with cardiac amyloidosis    Will sign off.  Call back if there is any questions or concerns.  If transthyretin amyloid testing comes back positive please let us know.      Quality-Core Measures

## 2018-12-29 NOTE — PROGRESS NOTES
Pulmonary Progress Note     Date of admission  12/20/2018     HISTORY OF PRESENT ILLNESS:  This is a 58-year-old  is a male who reports no   past medical history, smokes 3-5 cigarettes on a daily basis, presented to the   hospital on 12/20/2018 with complaints of 2 weeks of increasing dyspnea on   exertion, generalized fatigue, 4 weeks of increased lower extremity swelling   and abdominal distention.  Denies any fever, chills, sweats.  Denies any   active chest pain, no syncopal episodes, lightheadedness.  Denies any nausea,   vomiting, abdominal pain.  No diarrhea or dysuria.  Denies any recent travel.    Denies any history of clotting disorders.  Denies any stimulant utilization   or drug use. Patient was initially admitted to telemetry being worked up for   congestive heart failure given a BNP of greater than 1000 and hypoxia;   however, he continued to decline requiring BiPAP therapy and subsequently  transferred to the intensive care unit.      12/25/18: patient transferred out of ICU. Admits improvemnt in symptoms of   dyspnea and mild dependent edema. Denies productive cough or expectoration   no symptoms of reflux.  He denies any use of stimulants such as cocaine or   Methamphetamine. Occasionally uses marijuana. 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   Prior to that had been working as .     12/26/18: Denies any complaints today. Reports improvement in sob. SpO2   above the 90 on O2 supplementation @ 5 LPM. On diuretics. Net fluid balance   is -2300 over the past 24hrs.      12/27/18: Clinically stable and symptomatically improved. O2 supplementation   @4 LPM via NC. Scheduled for thoracentesis today.     12/28/18: Remains clinically stable. S/p thoracentesis yesterday but not enough   fluid to drain and thus couldn't be sent for analysis.     12/29/18: Cardiac MRI not suggestive of amyloidosis. Patient is scheduled for   cardiac  cath today. Symptoms of dyspnea have improved.      Review of Systems  Review of Systems   Constitutional: Negative for chills, diaphoresis and fever.   HENT: Negative for ear pain, nosebleeds, sinus pain and tinnitus.    Eyes: Negative for blurred vision, double vision and photophobia.   Respiratory: Negative for hemoptysis and stridor. No dyspnea or cough  Cardiovascular: Positive for leg swelling. Negative for chest pain, palpitations   and orthopnea.   Gastrointestinal: Negative for abdominal pain, constipation, nausea and vomiting.   Genitourinary: Negative for dysuria, frequency, hematuria and urgency.   Musculoskeletal: Negative for back pain, myalgias and neck pain.   Skin: Negative for rash.   Neurological: Negative for sensory change, speech change, focal weakness,   seizures and headaches.   Endo/Heme/Allergies: Does not bruise/bleed easily.   Psychiatric/Behavioral: Negative for depression, hallucinations, substance abuse   and suicidal ideas.         Vital Signs    Temp: 37.3 °C (99.2 °F)  Pulse: 98  Resp:  16  BP: 107/78  SpO2: 95% on O2 @ 4LPM via NC     Physical Exam   Physical Exam   Constitutional: He is oriented to person, place, and time. No respiratory distress.   HENT:   Head: Normocephalic and atraumatic.   Right Ear: External ear normal.   Left Ear: External ear normal.   Nose: Nose normal.   Mouth/Throat: Oropharynx is clear and moist.   Eyes: Pupils are equal, round, and reactive to light. Conjunctivae are normal.  Neck: Normal range of motion. Neck supple. No JVD present. No tracheal deviation   present.   Cardiovascular: Intact distal pulses.  Heart sounds normal No murmur heard.  Pulmonary/Chest: No stridor. No respiratory distress. He has no wheezes.   Diminished breath sounds at bases bilaterally  Abdominal: Soft. Bowel sounds are normal. He exhibits no distension. There is   no tenderness.   There is no rebound.   Musculoskeletal: He exhibits 1+ edema. He exhibits no tenderness or  deformity.   No clubbing or cyanosis   Neurological: He is alert and oriented to person, place, and time. No cranial nerve deficit.   Coordination normal. No focal weakness   Skin: Skin is warm and dry. No rash noted. He is not diaphoretic. No erythema.      Medications  Fish oil capsule  Hydrocortisone suppository  helene-docusate  Lovenox     Fluids     Intake/Output Summary        Gross per 24 hour   Intake             1680 ml   Output             1175 ml   Net              + 505 ml         Laboratory  Results for DINA CROUCH (MRN [id number]) as of 12/29/2018 14:15   Ref. Range 12/29/2018 02:40   Sodium Latest Ref Range: 135 - 145 mmol/L 140   Potassium Latest Ref Range: 3.6 - 5.5 mmol/L 4.3   Chloride Latest Ref Range: 96 - 112 mmol/L 99   Co2 Latest Ref Range: 20 - 33 mmol/L 36 (H)   Anion Gap Latest Ref Range: 0.0 - 11.9  5.0   Glucose Latest Ref Range: 65 - 99 mg/dL 100 (H)   Bun Latest Ref Range: 8 - 22 mg/dL 28 (H)   Creatinine Latest Ref Range: 0.50 - 1.40 mg/dL 0.86   GFR If  Latest Ref Range: >60 mL/min/1.73 m 2 >60   GFR If Non  Latest Ref Range: >60 mL/min/1.73 m 2 >60   Calcium Latest Ref Range: 8.5 - 10.5 mg/dL 8.8   Results for DINA CROUCH (MRN [id number]) as of 12/29/2018 14:15   Ref. Range 12/21/2018 18:00   Aspergillus fumigatus #1 Latest Ref Range: None Detected  None Detected   Aspergillus fumigaus #6 Latest Ref Range: None Detected  None Detected   Aureobasidium pullulans Latest Ref Range: None Detected  None Detected   HIV Ag/Ab Combo Assay Latest Ref Range: Non Reactive  Non Reactive   Micropolyspora Faeni Latest Ref Range: None Detected  None Detected   Newberry Serum Latest Ref Range: None Detected  None Detected   Thermoactinomyces vulgaris #1 Latest Ref Range: None Detected  None Detected   Rheumatoid Factor -Neph- Latest Ref Range: 0 - 14 IU/mL <10   Ccp Antibodies Latest Ref Range: 0 - 19 Units 3   Antinuclear Antibody Latest Ref Range: None Detected   None Detected   ANCA IgG Latest Ref Range: <1:20  <1:20   Results for DINA CROUCH (MRN [id number]) as of 12/29/2018 14:15   Ref. Range 12/26/2018 14:11   Total Protein Latest Ref Range: 6.00 - 8.30 g/dL 5.90 (L)   Albumin Latest Ref Range: 3.75 - 5.01 g/dL 2.93 (L)   Gamma Globulin Latest Ref Range: 0.62 - 1.51 g/dL 0.99   Alpha-1 Globulin Latest Ref Range: 0.19 - 0.46 g/dL 0.44   Alpha-2 Globulin Latest Ref Range: 0.48 - 1.05 g/dL 0.66   Beta Globulin Latest Ref Range: 0.48 - 1.10 g/dL 0.88     Results for DINA CROUCH (MRN [id number]) as of 12/29/2018 14:15   Ref. Range 12/21/2018 17:17   Color Unknown Yellow   Character Unknown Clear   Specific Gravity Latest Ref Range: <1.035  1.010   Ph Latest Ref Range: 5.0 - 8.0  5.0   Glucose Latest Ref Range: Negative mg/dL Negative   Ketones Latest Ref Range: Negative mg/dL Negative   Bilirubin Latest Ref Range: Negative  Negative   Occult Blood Latest Ref Range: Negative  Negative   Protein Latest Ref Range: Negative mg/dL Negative   Nitrite Latest Ref Range: Negative  Negative   Leukocyte Esterase Latest Ref Range: Negative  Negative   Urobilinogen, Urine Latest Ref Range: Negative  0.2   Micro Urine Req Unknown see below        Imaging  X-Ray:  I have personally reviewed the images and compared with prior images.    CT chest  FINDINGS:  Pulmonary Embolism: No.    Lungs: There are changes of emphysema. There is BILATERAL atelectasis. There is some peripheral interstitial prominence particularly in the bases with inter and intralobular septal thickening.    Pleura: There is a small to moderate RIGHT and small LEFT pleural effusion.    Nodes: No enlarged lymph nodes.    Additional findings: Central pulmonary arteries are large. There is a small pericardial effusion. There is at least a small amount of ascites.   Impression       1.  No central or segmental pulmonary embolus  2.  Emphysema  3.  Bibasilar atelectasis with superimposed pneumonia not excluded  4.  Small to  moderate RIGHT pleural effusion  5.  Small LEFT pleural effusion  6.  Interstitial pulmonary edema or possibly chronic interstitial lung disease such as IPF/UIP.  7.  Ascites  8.  Findings suggestive of pulmonary arterial hypertension  9.  Small pericardial effusion   Reading Provider Reading Date   Mal Ruvalcaba M.D. Dec 21, 2018          Echocardiography Laboratory    CONCLUSIONS  No prior study is available for comparison.   Left ventricle is small in size. Severe concentric left ventricular   hypertrophy. Hyperdynamic left ventricular systolic function. Left   ventricular ejection fraction is visually estimated to be greater than   75%. Abnormal septal motion consistent with right ventricular (RV)   volume overload and/or elevated RV end-diastolic pressure.   Indeterminate diastolic function. Near mid cavity obstruction.  Severely dilated right ventricle.  Right ventricular systolic pressure is estimated to be 80 mmHg.  TT to Dr Avendano 3PM    DINA CROUCH  Exam Date:         12/21/2018   Assessment/Plan     # Acute Hypoxic hypercapnic respiratory failure  # Severe pulmonary HTN secondary to cardiac and pulmonary etiology  # Pulmonary fibrosis/ ILD  # Bilateral pleural effusions  # Cadiomyopathy    - Taper FiO2 as tolerated to maintain SpO2 > 90%    -  Ultra sound chest shows not enough fluid that could be safely aspirated.  - CTA excluded PE- no indication for anticoagulation, but shows mild emphysema and ILD  - Patient scheduled for right heart cath   -LFTs   And 6 MWT as well as further workup of ILD once cardiopulmonary status improves.

## 2018-12-29 NOTE — PROCEDURES
DATE OF SERVICE:  12/29/2018    PROCEDURES:  1.  Right heart catheterization.  2.  Pulmonary vascular vasoreactivity assessment with intravenous adenosine.  3.  Right internal jugular vein approach.  4.  CONSCIOUS SEDATION    PREPROCEDURE DIAGNOSES:  1.  Severe pulmonary hypertension.  2.  Right heart failure.  3.  Diastolic heart failure.  4.  Interstitial lung disease, undetermined etiology.  5.  Chronic obstructive pulmonary disease.    POSTPROCEDURE DIAGNOSES:  1.  Pulmonary arterial hypertension.  2.  Negative response to vasodilator assessment with intravenous adenosine.  3.  Normal pulmonary capillary wedge pressure.    PHYSICIAN:  Miguel Newby MD    COMPLICATIONS:  None.    MEDICATIONS:  1.  Versed 1 mg IV.  2.  Fentanyl 50 mcg IV.  3.  Lidocaine 2% subcutaneous.  4.  Adenosine intravenous infusion protocol.    DESCRIPTION OF PROCEDURE:  After an informed consent was obtained, the patient   was brought to the cardiac catheterization laboratory where he was prepped,   draped, and anesthetized in the usual manner.    Using ultrasound guidance and modified Seldinger technique, a 6-Tajik x 10 cm   introducer sheath was inserted in the right internal jugular vein.    Next, a 6-Tajik balloon tip [place] catheter was advanced into the   pulmonary artery and pulmonary capillary wedge position.  Right heart   pressures and thermodilution cardiac outputs were obtained.    Next, a pulmonary vascular vasoreactivity assessment with intravenous   adenosine using standard protocol was carried out.    At the end the procedure, catheters were removed.  Hemostasis was achieved   with manual compression.  The patient tolerated the procedure well.    CONSCIOUS SEDATION: I monitored and supervised the administration of intravenous conscious sedation from 8:42 AM until 9:05 a.m.    FINDINGS:  HEMODYNAMICS:  RIGHT HEART PRESSURES:  1.  Right atrial pressure mean 6 mmHg.  2.  Right ventricular pressure 49/11.  3.   Pulmonary artery pressure 52/23, mean of 35 mmHg.  4.  Pulmonary capillary wedge pressure mean 8 mmHg.    Cardiac output thermodilution method 5.7 liters per minute, cardiac index 2.9   liters per minute per meter squared.    Pulmonary vascular resistance 5.1 Wood units.    Pulmonary vasoreactivity assessment with intravenous adenosine.  1.  Baseline:    Pulmonary artery pressure 51/23, mean of 35 mmHg.  2.  Adenosine 50 mcg per kilogram per minute:    Pulmonary artery pressure 52/24, mean of 36 mmHg.  3.  Adenosine 150 mcg per kilogram per minute:    Pulmonary artery pressure 53/25, mean of 37 mmHg.    PLAN:  1.  Conventional symptomatic therapy.  2.  Will initiate advanced pulmonary arterial hypertension therapy.  3.  Further diagnostic evaluation as to the etiology of his interstitial lung   disease per pulmonary.       ____________________________________     MD ANAMARIA DAVEY / NATIVIDAD    DD:  12/29/2018 09:56:08  DT:  12/29/2018 10:27:20    D#:  [id number]  Job#:  [id number]

## 2018-12-29 NOTE — PROGRESS NOTES
Cardiology Follow Up Progress Note    Date of Service  12/29/2018    Attending Physician  Cheng Bailey D.O.    Chief Complaint   Shortness of breath and edema.    HPI  Alexis Georges is a 58 y.o. male admitted 12/20/2018 with shortness of breath and lower extremity edema.    Interim Events   12/24: Shortness of breath and edema improved.  Urine output 2050 cc  12/25: Shortness of breath and edema continues to improve.  Urine output 3250 cc feels better.  Smoked a few cigarettes intermittently.  12/26: No cardiac symptoms.  Overall feeling better.  Continues to tolerate diuresis.  Urine output 3250 cc.  12/27: No new cardiac symptoms.  Continues to feel better with diuresis and less edema.  Urine output 3375 cc.  12/28: No cardiac symptoms.  Continues to feel better. Urine output incompletely documented measured.  Insufficient fluid to perform thoracentesis yesterday.  12/29: No new cardiac events.  Tolerated cardiac MRI yesterday.    Review of Systems  Review of Systems   Respiratory: Negative for cough and chest tightness.    Cardiovascular: Negative for chest pain, palpitations and leg swelling.     Vital signs in last 24 hours  Temp:  [36.7 °C (98 °F)-37.2 °C (99 °F)] 36.7 °C (98 °F)  Pulse:  [] 90  Resp:  [16-18] 17  BP: ()/(67-96) 115/87    Physical Exam  Physical Exam   Constitutional: He is oriented to person, place, and time. He appears well-nourished. No distress.   HENT:   Head: Normocephalic.   Neck: No JVD present.       Cardiovascular: Normal rate, regular rhythm, S1 normal, S2 normal, normal heart sounds and intact distal pulses.  Exam reveals no gallop and no friction rub.    No murmur heard.  Pulmonary/Chest: Effort normal. He has no wheezes. He has no rhonchi. He has no rales.   Decreased rhonchi.   Musculoskeletal: He exhibits edema.   Mild pedal and distal lower extremity edema.   Neurological: He is alert and oriented to person, place, and time.   Skin: Skin is warm and dry.    Psychiatric: He has a normal mood and affect. His behavior is normal.       Lab Review  Lab Results   Component Value Date/Time    WBC 12.3 (H) 12/25/2018 03:23 AM    RBC 4.71 12/25/2018 03:23 AM    HEMOGLOBIN 14.4 12/25/2018 03:23 AM    HEMATOCRIT 43.2 12/25/2018 03:23 AM    MCV 91.7 12/25/2018 03:23 AM    MCH 30.6 12/25/2018 03:23 AM    MCHC 33.3 (L) 12/25/2018 03:23 AM    MPV 9.3 12/25/2018 03:23 AM      Lab Results   Component Value Date/Time    SODIUM 140 12/29/2018 02:40 AM    POTASSIUM 4.3 12/29/2018 02:40 AM    CHLORIDE 99 12/29/2018 02:40 AM    CO2 36 (H) 12/29/2018 02:40 AM    GLUCOSE 100 (H) 12/29/2018 02:40 AM    BUN 28 (H) 12/29/2018 02:40 AM    CREATININE 0.86 12/29/2018 02:40 AM      Lab Results   Component Value Date/Time    ASTSGOT 18 12/24/2018 04:30 AM    ALTSGPT 16 12/24/2018 04:30 AM     Lab Results   Component Value Date/Time    CHOLSTRLTOT 109 12/21/2018 04:05 AM    LDL 73 12/21/2018 04:05 AM    HDL 27 (A) 12/21/2018 04:05 AM    TRIGLYCERIDE 46 12/21/2018 04:05 AM    TROPONINI 0.09 (H) 12/21/2018 08:54 AM             Cardiac Imaging and Procedures Review  Rhythm: 12/28/2018 normal sinus rhythm, rate 97 reviewed by myself.    ECHOCARDIOGRAM 12/21/2018  No prior study is available for comparison.   Left ventricle is small in size. Severe concentric left ventricular   hypertrophy. Hyperdynamic left ventricular systolic function. Left   ventricular ejection fraction is visually estimated to be greater than   75%. Abnormal septal motion consistent with right ventricular (RV)   volume overload and/or elevated RV end-diastolic pressure.   Indeterminate diastolic function. Near mid cavity obstruction.  Severely dilated right ventricle.  Right ventricular systolic pressure is estimated to be 80 mmHg.     CTA chest:  1.  No central or segmental pulmonary embolus  2.  Emphysema  3.  Bibasilar atelectasis with superimposed pneumonia not excluded  4.  Small to moderate RIGHT pleural effusion  5.  Small  LEFT pleural effusion  6.  Interstitial pulmonary edema or possibly chronic interstitial lung disease such as IPF/UIP.  7.  Ascites  8.  Findings suggestive of pulmonary arterial hypertension  9.  Small pericardial effusion    Imaging  Chest X-Ray: 12/24/2018 interstitial fibrosis and edema with effusion improved from 12/20/2018.  Reviewed by myself.    Assessment/Plan  Impressions:  1.    Acute respiratory failure with evidence of interstitial and emphysematous lung disease with bilateral pleural effusions.  Improving.  2.   Pulmonary hypertension severe with severe right heart failure.  Resolving.  3.  Interstitial lung disease.  Unclear etiology.  4.  Emphysema.  5.  Chronic tobacco use.    6.  Diastolic heart failure concern for infiltrative cardiomyopathy versus secondary diastolic dysfunction/failure related to severe pulmonary hypertension.  7.  Poly-clonal gammopathy, nonspecific    Recommendations and Discussion:  1.  Patient agrees to right heart catheterization today.    Reviewed with patient and discussed with RN.    Please contact me with any questions.    Miguel Newby M.D.   Cardiologist, [facility]  [phone]

## 2018-12-29 NOTE — PROGRESS NOTES
Assumed care at 0715. Bedside report received from Night RN Cindy. Patient's chart and MAR reviewed. 12 hour chart check complete. Patient was updated on plan of care for the day. Questions answered and concerns addressed. Pt NPO for cardiac cath today. Pt denies any additional needs at this time. White board updated. Call light, phone and personal belongings within reach. Vital signs stable

## 2018-12-29 NOTE — PROGRESS NOTES
Bedside report received by day DOLORES Mayes. Patient sitting up in bed watching TV at this time. POC discussed, verbalized understanding. No immediate concerns for patient at this time. All safety measures in place.

## 2018-12-30 ENCOUNTER — APPOINTMENT (OUTPATIENT)
Dept: RADIOLOGY | Facility: MEDICAL CENTER | Age: 58
DRG: 286 | End: 2018-12-30
Attending: INTERNAL MEDICINE
Payer: MEDICAID

## 2018-12-30 PROCEDURE — 770020 HCHG ROOM/CARE - TELE (206)

## 2018-12-30 PROCEDURE — 700111 HCHG RX REV CODE 636 W/ 250 OVERRIDE (IP): Performed by: INTERNAL MEDICINE

## 2018-12-30 PROCEDURE — A9270 NON-COVERED ITEM OR SERVICE: HCPCS | Performed by: HOSPITALIST

## 2018-12-30 PROCEDURE — 71046 X-RAY EXAM CHEST 2 VIEWS: CPT

## 2018-12-30 PROCEDURE — 700102 HCHG RX REV CODE 250 W/ 637 OVERRIDE(OP): Performed by: HOSPITALIST

## 2018-12-30 PROCEDURE — 99232 SBSQ HOSP IP/OBS MODERATE 35: CPT | Performed by: INTERNAL MEDICINE

## 2018-12-30 RX ORDER — FUROSEMIDE 20 MG/1
20 TABLET ORAL
Status: DISCONTINUED | OUTPATIENT
Start: 2018-12-31 | End: 2018-12-31 | Stop reason: HOSPADM

## 2018-12-30 RX ORDER — FUROSEMIDE 10 MG/ML
40 INJECTION INTRAMUSCULAR; INTRAVENOUS ONCE
Status: COMPLETED | OUTPATIENT
Start: 2018-12-30 | End: 2018-12-30

## 2018-12-30 RX ADMIN — OMEGA-3 FATTY ACIDS CAP 1000 MG 1000 MG: 1000 CAP at 18:07

## 2018-12-30 RX ADMIN — ENOXAPARIN SODIUM 40 MG: 100 INJECTION SUBCUTANEOUS at 05:40

## 2018-12-30 RX ADMIN — OMEGA-3 FATTY ACIDS CAP 1000 MG 1000 MG: 1000 CAP at 11:03

## 2018-12-30 RX ADMIN — OMEGA-3 FATTY ACIDS CAP 1000 MG 1000 MG: 1000 CAP at 07:30

## 2018-12-30 RX ADMIN — HYDROCORTISONE ACETATE 25 MG: 25 SUPPOSITORY RECTAL at 05:40

## 2018-12-30 RX ADMIN — FUROSEMIDE 40 MG: 10 INJECTION, SOLUTION INTRAMUSCULAR; INTRAVENOUS at 11:02

## 2018-12-30 ASSESSMENT — ENCOUNTER SYMPTOMS
NERVOUS/ANXIOUS: 0
CHEST TIGHTNESS: 0
SORE THROAT: 0
NAUSEA: 0
COUGH: 0
BRUISES/BLEEDS EASILY: 0
ORTHOPNEA: 0
PALPITATIONS: 0
DOUBLE VISION: 0
WEIGHT LOSS: 0
SPUTUM PRODUCTION: 0
BACK PAIN: 0
CHILLS: 0
CONSTIPATION: 0
BLURRED VISION: 0
SPEECH CHANGE: 0
DEPRESSION: 0
FEVER: 0
POLYDIPSIA: 0
SHORTNESS OF BREATH: 0
BLOOD IN STOOL: 0
NECK PAIN: 0
ABDOMINAL PAIN: 0
VOMITING: 0
HEADACHES: 0
PHOTOPHOBIA: 0
TINGLING: 0

## 2018-12-30 ASSESSMENT — PAIN SCALES - GENERAL
PAINLEVEL_OUTOF10: 0

## 2018-12-30 NOTE — CARE PLAN
Problem: Communication  Goal: The ability to communicate needs accurately and effectively will improve  Outcome: PROGRESSING AS EXPECTED    Intervention: [place] patient and significant other/support system to call light to alert staff of needs  Patient oriented to call light system and has been able to communicate needs accurately and effectively.      Problem: Safety  Goal: Will remain free from falls  Outcome: PROGRESSING AS EXPECTED    Intervention: Assess risk factors for falls  Patient has been assessed for fall risks and fall precautions have been put in place.

## 2018-12-30 NOTE — PROGRESS NOTES
Hospital Medicine Daily Progress Note    Date of Service  12/29/2018    Chief Complaint  SHort of breath    Hospital Course    58 y.o. male admitted 12/20/2018 with shortness of breath and increasing leg swelling over the prior 4 weeks.  He required BiPAP therapy in ICU       Interval Problem Update    VSS  On 2.5 L nasal cannula.  We need to do home O2 evaluation  Status post heart catheterization: Negative vasoreactivity test, moderate pulmonary hypertension  Pulmonary to consider PDE 5, prostacyclin, endothelin receptor antagonist.  MRI of the heart was negative for amyloidosis.  Discussed with oncologist Dr. Ginna BROWN negative for monoclonal spike    Home with home oxygen when set up    Consultants/Specialty  Pulmonary  Cardiology  Oncology  Code Status  FULL    Disposition  Home with home oxygen    Review of Systems  Review of Systems   Constitutional: Negative for chills, fever and weight loss.   HENT: Negative for congestion, hearing loss and sore throat.    Eyes: Negative for blurred vision, double vision and photophobia.   Respiratory: Negative for cough, sputum production and shortness of breath.    Cardiovascular: Positive for leg swelling. Negative for chest pain, palpitations and orthopnea.   Gastrointestinal: Negative for abdominal pain, blood in stool, constipation, melena, nausea and vomiting.   Genitourinary: Negative for dysuria and hematuria.   Musculoskeletal: Negative for back pain and neck pain.   Skin: Negative for itching and rash.   Neurological: Negative for tingling, speech change and headaches.   Endo/Heme/Allergies: Negative for polydipsia. Does not bruise/bleed easily.   Psychiatric/Behavioral: Negative for depression and suicidal ideas. The patient is not nervous/anxious.    Shortness of breath and leg swelling improving    Physical Exam  Temp:  [36.4 °C (97.6 °F)-37.3 °C (99.2 °F)] 37.3 °C (99.1 °F)  Pulse:  [] 105  Resp:  [16-18] 18  BP: ()/(67-96) 118/72    Physical  Exam   Constitutional: He is oriented to person, place, and time. He appears well-developed and well-nourished. No distress.   HENT:   Head: Normocephalic and atraumatic.   Nose: Nose normal.   Mouth/Throat: Oropharynx is clear and moist.   Eyes: Conjunctivae and EOM are normal. Right eye exhibits no discharge. Left eye exhibits no discharge. No scleral icterus.   Neck: No tracheal deviation present.   Cardiovascular: Normal rate, regular rhythm, normal heart sounds and intact distal pulses.    No murmur heard.  Pulmonary/Chest: Effort normal. No stridor. No respiratory distress. He has no wheezes. He has rales.   Abdominal: Soft. Bowel sounds are normal. He exhibits no distension. There is no tenderness.   Musculoskeletal: He exhibits edema (decreased).   Lymphadenopathy:     He has no cervical adenopathy.   Neurological: He is alert and oriented to person, place, and time. No cranial nerve deficit.   Skin: Skin is warm. He is not diaphoretic.   Psychiatric: He has a normal mood and affect. His behavior is normal. Thought content normal.   Vitals reviewed.  Nasal cannula 2.5 L    Fluids    Intake/Output Summary (Last 24 hours) at 12/29/18 1611  Last data filed at 12/29/18 1200   Gross per 24 hour   Intake              840 ml   Output              875 ml   Net              -35 ml       Laboratory      Recent Labs      12/28/18   1025  12/29/18   0240   SODIUM  139  140   POTASSIUM  4.0  4.3   CHLORIDE  96  99   CO2  38*  36*   GLUCOSE  114*  100*   BUN  26*  28*   CREATININE  0.73  0.86   CALCIUM  9.1  8.8                   Imaging  MR-CARDIAC MORPH/FUNC WITH & W/O   Final Result      1. No MR evidence of cardiac amyloidosis.   2. Small region of subendocardial delayed enhancement in the inferior LV base, nonspecific.   3. Dilated, hypertrophic right ventricle with moderate to severe decrease in RV function. RVEF of 23%.   4. Early diastolic septal bounce, which may be due to elevated RV pressure or, less likely, a  bundle branch block.   5. Normal left ventricular size and function.   6. Trivial to mild pulmonic and tricuspid regurgitation.   7. Mild right atrial enlargement.   8. Small pericardial effusion.      US-CHEST   Final Result      No significant right effusion. Not enough fluid to drain.      US-EXTREMITY VENOUS LOWER UNILAT LEFT   Final Result      DX-CHEST-LIMITED (1 VIEW)   Final Result      Improving bibasilar interstitial edema pattern. Unchanged cardiomegaly and small bilateral pleural effusions.      CT-CTA CHEST PULMONARY ARTERY W/ RECONS   Final Result      1.  No central or segmental pulmonary embolus   2.  Emphysema   3.  Bibasilar atelectasis with superimposed pneumonia not excluded   4.  Small to moderate RIGHT pleural effusion   5.  Small LEFT pleural effusion   6.  Interstitial pulmonary edema or possibly chronic interstitial lung disease such as IPF/UIP.   7.  Ascites   8.  Findings suggestive of pulmonary arterial hypertension   9.  Small pericardial effusion         EC-ECHOCARDIOGRAM COMPLETE W/O CONT   Final Result      DX-CHEST-PORTABLE (1 VIEW)   Final Result         1. Cardiomegaly and bibasilar interstitial/alveolar opacities, right worse than left. They likely represent consolidations and/or edema. Multifocal pneumonia can be considered in the appropriate clinical settings.   2. Small right pleural effusion.      US-EXTREMITY VENOUS LOWER UNILAT RIGHT    (Results Pending)        Assessment/Plan  * Acute respiratory failure with hypoxia and hypercapnia (HCC)   Assessment & Plan    Concern of cardiomyopathy with restriction from hypertrophy  [place] decline acceptance  Diuresing and monitor strict I/O's  Monitor vitals, labs  On BiPAP just overnight as of 12/22  On nasal canula 12/23 am.  Rt protocol  Pulmonary consulting  Moderate pulmonary hypertension with negative reactivity test on angiography     Abnormal CT scan of lung   Assessment & Plan    12/21 with emphysema and possible ILD  findings; bilateral pleural effusion  Pulmonary following.  Will need outpatient pulmonary follow-up     GI bleed   Assessment & Plan    Bloody mucus-like per RN  No sign of hemorrhoid on external exam 12/23.  Possible internal hemorrhoids. Start Anusol cream  Needs future colonoscopy once stable, outpatient  Monitor Hgb  12/21 Hgb:12.6  12/22 Hgb:13.7  12/23 Hgb: 14.5 appears to be heme concentrating with diuresis  INR 1.1  Hemoglobin remained stable     Pulmonary hypertension (HCC)   Assessment & Plan    RVSP: 80mmHg  CTA negative for PE  Supplemental oxygen  Cardiology consulted  Moderate pulmonary hypertension with negative vasoreactivity     Acute on chronic right-sided heart failure (HCC)   Assessment & Plan    Cardiology consulting.  Diuresis, strict I/O's, monitor labs  IMproving peripheral edema.  12/24 Total output since admit: -2.1L  Checking CXR as has ongoing decreased breath sounds right lung on exam  12/21 Echo: Left ventricle is small in size. Severe concentric left ventricular   hypertrophy. Hyperdynamic left ventricular systolic function. Left   ventricular ejection fraction is visually estimated to be greater than   75%. Abnormal septal motion consistent with right ventricular (RV)   volume overload and/or elevated RV end-diastolic pressure.   Indeterminate diastolic function. Near mid cavity obstruction.  Severely dilated right ventricle.  Right ventricular systolic pressure is estimated to be 80 mmHg.  Pulmonary hypertension probably secondary to pulmonary disease.  Heart catheterization considered while in the hospital  Diastolic heart failure: MRI of the heart negative for amyloidosis.  Transthyretin test pending  UPEP with polyclonal spikes,  SPEP w/o spikes. Hematology signed off  Moderate pulmonary hypertension on heart catheterization, negative reactivity test     Elevated troponin   Assessment & Plan    C/O CHF as etiology  monitor     Hypercapnia   Assessment & Plan    On BiPAP last  night  On nasal canula 12/23am  Monitor abg  Pulmonary consulted     Pleural effusion, bilateral   Assessment & Plan    Not enough fluid to drain     Hypotension   Assessment & Plan    Resolved          VTE prophylaxis: SCD

## 2018-12-30 NOTE — PROGRESS NOTES
"Cardiology Follow Up Progress Note    Date of Service  12/30/2018    Attending Physician  Cheng Bailey D.O.    Chief Complaint   Shortness of breath and edema.    HPI  Alexis Georges is a 58 y.o. male admitted 12/20/2018 with shortness of breath and lower extremity edema.    Interim Events   12/24: Shortness of breath and edema improved.  Urine output 2050 cc  12/25: Shortness of breath and edema continues to improve.  Urine output 3250 cc feels better.  Smoked a few cigarettes intermittently.  12/26: No cardiac symptoms.  Overall feeling better.  Continues to tolerate diuresis.  Urine output 3250 cc.  12/27: No new cardiac symptoms.  Continues to feel better with diuresis and less edema.  Urine output 3375 cc.  12/28: No cardiac symptoms.  Continues to feel better. Urine output incompletely documented measured.  Insufficient fluid to perform thoracentesis yesterday.  12/29: No new cardiac events.  Tolerated cardiac MRI yesterday.  12/30: No cardiac events.  Tolerated right heart cath yesterday.  Feels that he needs more \"Lasix\".    Review of Systems  Review of Systems   Respiratory: Negative for cough and chest tightness.    Cardiovascular: Negative for chest pain, palpitations and leg swelling.     Vital signs in last 24 hours  Temp:  [36.3 °C (97.3 °F)-37.3 °C (99.2 °F)] 37.1 °C (98.7 °F)  Pulse:  [] 114  Resp:  [16-20] 18  BP: ()/(72-91) 117/77    Physical Exam  Physical Exam   Constitutional: He is oriented to person, place, and time. He appears well-nourished. No distress.   HENT:   Head: Normocephalic.   Neck: No JVD present.       Cardiovascular: Normal rate, regular rhythm, S1 normal, S2 normal, normal heart sounds and intact distal pulses.  Exam reveals no gallop and no friction rub.    No murmur heard.  Pulmonary/Chest: Effort normal. He has no wheezes. He has no rhonchi. He has no rales.   Decreased rhonchi.   Musculoskeletal: He exhibits edema.   Mild pedal and distal lower extremity " edema.   Neurological: He is alert and oriented to person, place, and time.   Skin: Skin is warm and dry.   Psychiatric: He has a normal mood and affect. His behavior is normal.       Lab Review  Lab Results   Component Value Date/Time    WBC 12.3 (H) 12/25/2018 03:23 AM    RBC 4.71 12/25/2018 03:23 AM    HEMOGLOBIN 14.4 12/25/2018 03:23 AM    HEMATOCRIT 43.2 12/25/2018 03:23 AM    MCV 91.7 12/25/2018 03:23 AM    MCH 30.6 12/25/2018 03:23 AM    MCHC 33.3 (L) 12/25/2018 03:23 AM    MPV 9.3 12/25/2018 03:23 AM      Lab Results   Component Value Date/Time    SODIUM 140 12/29/2018 02:40 AM    POTASSIUM 4.3 12/29/2018 02:40 AM    CHLORIDE 99 12/29/2018 02:40 AM    CO2 36 (H) 12/29/2018 02:40 AM    GLUCOSE 100 (H) 12/29/2018 02:40 AM    BUN 28 (H) 12/29/2018 02:40 AM    CREATININE 0.86 12/29/2018 02:40 AM      Lab Results   Component Value Date/Time    ASTSGOT 18 12/24/2018 04:30 AM    ALTSGPT 16 12/24/2018 04:30 AM     Lab Results   Component Value Date/Time    CHOLSTRLTOT 109 12/21/2018 04:05 AM    LDL 73 12/21/2018 04:05 AM    HDL 27 (A) 12/21/2018 04:05 AM    TRIGLYCERIDE 46 12/21/2018 04:05 AM    TROPONINI 0.09 (H) 12/21/2018 08:54 AM             Cardiac Imaging and Procedures Review  Rhythm: 12/30/2018 normal sinus rhythm, rate 94 reviewed by myself.    ECHOCARDIOGRAM 12/21/2018  No prior study is available for comparison.   Left ventricle is small in size. Severe concentric left ventricular   hypertrophy. Hyperdynamic left ventricular systolic function. Left   ventricular ejection fraction is visually estimated to be greater than   75%. Abnormal septal motion consistent with right ventricular (RV)   volume overload and/or elevated RV end-diastolic pressure.   Indeterminate diastolic function. Near mid cavity obstruction.  Severely dilated right ventricle.  Right ventricular systolic pressure is estimated to be 80 mmHg.     CTA chest:  1.  No central or segmental pulmonary embolus  2.  Emphysema  3.  Bibasilar  atelectasis with superimposed pneumonia not excluded  4.  Small to moderate RIGHT pleural effusion  5.  Small LEFT pleural effusion  6.  Interstitial pulmonary edema or possibly chronic interstitial lung disease such as IPF/UIP.  7.  Ascites  8.  Findings suggestive of pulmonary arterial hypertension  9.  Small pericardial effusion    Imaging  Chest X-Ray: 12/24/2018 interstitial fibrosis and edema with effusion improved from 12/20/2018.  Reviewed by myself.    CARDIAC MRI 12/28/2018  1. No MR evidence of cardiac amyloidosis.  2. Small region of subendocardial delayed enhancement in the inferior LV base, nonspecific.  3. Dilated, hypertrophic right ventricle with moderate to severe decrease in RV function. RVEF of 23%.  4. Early diastolic septal bounce, which may be due to elevated RV pressure or, less likely, a bundle branch block.  5. Normal left ventricular size and function.  6. Trivial to mild pulmonic and tricuspid regurgitation.  7. Mild right atrial enlargement.  8. Small pericardial effusion.    Assessment/Plan  Impressions:  1.    Acute respiratory failure with evidence of interstitial and emphysematous lung disease with bilateral pleural effusions.  Improving.  2.   Pulmonary arterial hypertension severe with severe right heart failure.  Resolving.  3.  Interstitial lung disease.  Unclear etiology.  4.  Emphysema.  5.  Chronic tobacco use.    6.  Does not appear to have evidence of a infiltrative cardiomyopathy by cardiac MRI.  7.  Poly-clonal gammopathy, nonspecific    Recommendations and Discussion:  1.  IV Lasix today.  2.  Transition to oral Lasix tomorrow.  3.  CXR PA and lateral.  4.  Okay to discharge from a cardiac standpoint.  5.  Will arrange to initiate empiric advanced pulmonary arterial hypertension therapy as an outpatient.  6.  Await further pulmonary evaluation and assessment.    Reviewed with patient and discussed with RN.    Please contact me with any questions.    Miguel Newby,  M.D.   Cardiologist, [facility]  [phone]

## 2018-12-31 ENCOUNTER — PATIENT OUTREACH (OUTPATIENT)
Dept: HEALTH INFORMATION MANAGEMENT | Facility: OTHER | Age: 58
End: 2018-12-31

## 2018-12-31 VITALS
HEIGHT: 76 IN | OXYGEN SATURATION: 94 % | TEMPERATURE: 98.3 F | DIASTOLIC BLOOD PRESSURE: 88 MMHG | WEIGHT: 160.27 LBS | HEART RATE: 115 BPM | RESPIRATION RATE: 19 BRPM | SYSTOLIC BLOOD PRESSURE: 129 MMHG | BODY MASS INDEX: 19.52 KG/M2

## 2018-12-31 LAB
ANION GAP SERPL CALC-SCNC: 3 MMOL/L (ref 0–11.9)
BUN SERPL-MCNC: 26 MG/DL (ref 8–22)
CALCIUM SERPL-MCNC: 8.8 MG/DL (ref 8.5–10.5)
CHLORIDE SERPL-SCNC: 101 MMOL/L (ref 96–112)
CO2 SERPL-SCNC: 35 MMOL/L (ref 20–33)
CREAT SERPL-MCNC: 0.91 MG/DL (ref 0.5–1.4)
GLUCOSE SERPL-MCNC: 147 MG/DL (ref 65–99)
POTASSIUM SERPL-SCNC: 4.1 MMOL/L (ref 3.6–5.5)
SODIUM SERPL-SCNC: 139 MMOL/L (ref 135–145)

## 2018-12-31 PROCEDURE — A9270 NON-COVERED ITEM OR SERVICE: HCPCS | Performed by: HOSPITALIST

## 2018-12-31 PROCEDURE — A9270 NON-COVERED ITEM OR SERVICE: HCPCS | Performed by: INTERNAL MEDICINE

## 2018-12-31 PROCEDURE — 700102 HCHG RX REV CODE 250 W/ 637 OVERRIDE(OP): Performed by: INTERNAL MEDICINE

## 2018-12-31 PROCEDURE — 700111 HCHG RX REV CODE 636 W/ 250 OVERRIDE (IP): Performed by: INTERNAL MEDICINE

## 2018-12-31 PROCEDURE — 36415 COLL VENOUS BLD VENIPUNCTURE: CPT

## 2018-12-31 PROCEDURE — 700102 HCHG RX REV CODE 250 W/ 637 OVERRIDE(OP): Performed by: HOSPITALIST

## 2018-12-31 PROCEDURE — 80048 BASIC METABOLIC PNL TOTAL CA: CPT

## 2018-12-31 PROCEDURE — 99239 HOSP IP/OBS DSCHRG MGMT >30: CPT | Performed by: INTERNAL MEDICINE

## 2018-12-31 RX ORDER — ALBUTEROL SULFATE 90 UG/1
2 AEROSOL, METERED RESPIRATORY (INHALATION) EVERY 6 HOURS PRN
Qty: 8.5 G | Refills: 1 | Status: SHIPPED | OUTPATIENT
Start: 2018-12-31

## 2018-12-31 RX ORDER — FUROSEMIDE 20 MG/1
20 TABLET ORAL DAILY
Qty: 60 TAB | Refills: 1 | Status: ON HOLD | OUTPATIENT
Start: 2019-01-01 | End: 2019-04-16

## 2018-12-31 RX ORDER — CHLORAL HYDRATE 500 MG
1000 CAPSULE ORAL
Qty: 90 CAP | Refills: 0 | Status: ON HOLD | OUTPATIENT
Start: 2018-12-31 | End: 2019-04-14

## 2018-12-31 RX ADMIN — ENOXAPARIN SODIUM 40 MG: 100 INJECTION SUBCUTANEOUS at 05:50

## 2018-12-31 RX ADMIN — OMEGA-3 FATTY ACIDS CAP 1000 MG 1000 MG: 1000 CAP at 05:50

## 2018-12-31 RX ADMIN — FUROSEMIDE 20 MG: 20 TABLET ORAL at 05:50

## 2018-12-31 ASSESSMENT — ENCOUNTER SYMPTOMS
ABDOMINAL PAIN: 0
CHEST TIGHTNESS: 0
AGITATION: 0
NUMBNESS: 0
DIAPHORESIS: 0
CHILLS: 0
PALPITATIONS: 0
ABDOMINAL DISTENTION: 0
COLOR CHANGE: 0
CONFUSION: 0
BLOOD IN STOOL: 0
SHORTNESS OF BREATH: 0
TROUBLE SWALLOWING: 0
NERVOUS/ANXIOUS: 1
FEVER: 0
DIZZINESS: 0
COUGH: 0

## 2018-12-31 ASSESSMENT — PAIN SCALES - GENERAL
PAINLEVEL_OUTOF10: 0

## 2018-12-31 NOTE — PROGRESS NOTES
Bedside report received by day DOLORES Marquis. Patient sitting up in bed watching TV at this time. POC discussed, verbalized understanding. No immediate concerns for patient at this time. All safety measures in place.

## 2018-12-31 NOTE — PROGRESS NOTES
Received report from night shift RN Cindy. Assumed care of patient. Patient is SR on the monitor at this time. Patient is resting in bed with no family present at bedside at this time. Patient declines any needs at this time. Plan of care discussed with the patient. Bed locked and in the lowest position with call light within reach.

## 2018-12-31 NOTE — PROGRESS NOTES
Cardiology Follow Up Progress Note    Date of Service  12/31/2018    Attending Physician  Alvin Champion M.D.    Chief Complaint   SOB and cough    Cardiology consult       HPI  Alexis Georges is a 58 y.o. male admitted 12/20/2018 with cough and shortness of breath.  He was noted to have right ventricular systolic pressure 80.  Severe concentric left ventricular hypertrophy.  Past medical history of tobacco abuse.    Interim Events  12/31/18: Patient resting on the edge of bed.  Denies any shortness of breath or chest pain.  On room air sat above 90s. Vital sign stable. No rhythm issue overnight.     Review of Systems  Review of Systems   Constitutional: Negative for chills, diaphoresis and fever.   HENT: Negative for nosebleeds and trouble swallowing.    Respiratory: Negative for cough, chest tightness and shortness of breath.    Cardiovascular: Negative for chest pain, palpitations and leg swelling.   Gastrointestinal: Negative for abdominal distention, abdominal pain and blood in stool.   Genitourinary: Negative for hematuria.   Skin: Negative for color change.   Neurological: Negative for dizziness, syncope and numbness.   Psychiatric/Behavioral: Negative for agitation and confusion. The patient is nervous/anxious.        Vital signs in last 24 hours  Temp:  [36.2 °C (97.1 °F)-37.7 °C (99.8 °F)] 36.2 °C (97.1 °F)  Pulse:  [101-120] 102  Resp:  [18-20] 20  BP: (102-122)/(69-84) 115/83    Physical Exam  Physical Exam   Constitutional: He is oriented to person, place, and time. He appears well-developed and well-nourished. No distress.   HENT:   Head: Normocephalic.   Neck: Normal range of motion. No JVD present.   Cardiovascular: Normal rate, regular rhythm, normal heart sounds and intact distal pulses.    No murmur heard.  Pulmonary/Chest: Effort normal and breath sounds normal. No respiratory distress. He has no wheezes.   Abdominal: He exhibits no distension. There is no tenderness.   Musculoskeletal: He  exhibits no edema or tenderness.   Neurological: He is alert and oriented to person, place, and time.   Skin: Skin is warm and dry. No rash noted. He is not diaphoretic.   Psychiatric: His behavior is normal. Judgment normal.   Nursing note and vitals reviewed.      Lab Review  Lab Results   Component Value Date/Time    WBC 12.3 (H) 12/25/2018 03:23 AM    RBC 4.71 12/25/2018 03:23 AM    HEMOGLOBIN 14.4 12/25/2018 03:23 AM    HEMATOCRIT 43.2 12/25/2018 03:23 AM    MCV 91.7 12/25/2018 03:23 AM    MCH 30.6 12/25/2018 03:23 AM    MCHC 33.3 (L) 12/25/2018 03:23 AM    MPV 9.3 12/25/2018 03:23 AM      Lab Results   Component Value Date/Time    SODIUM 139 12/31/2018 02:21 AM    POTASSIUM 4.1 12/31/2018 02:21 AM    CHLORIDE 101 12/31/2018 02:21 AM    CO2 35 (H) 12/31/2018 02:21 AM    GLUCOSE 147 (H) 12/31/2018 02:21 AM    BUN 26 (H) 12/31/2018 02:21 AM    CREATININE 0.91 12/31/2018 02:21 AM      Lab Results   Component Value Date/Time    ASTSGOT 18 12/24/2018 04:30 AM    ALTSGPT 16 12/24/2018 04:30 AM     Lab Results   Component Value Date/Time    CHOLSTRLTOT 109 12/21/2018 04:05 AM    LDL 73 12/21/2018 04:05 AM    HDL 27 (A) 12/21/2018 04:05 AM    TRIGLYCERIDE 46 12/21/2018 04:05 AM    TROPONINI 0.09 (H) 12/21/2018 08:54 AM             Cardiac Imaging and Procedures Review    Echocardiogram:    12/21/18  No prior study is available for comparison.   Left ventricle is small in size. Severe concentric left ventricular   hypertrophy. Hyperdynamic left ventricular systolic function. Left   ventricular ejection fraction is visually estimated to be greater than   75%. Abnormal septal motion consistent with right ventricular (RV)   volume overload and/or elevated RV end-diastolic pressure.   Indeterminate diastolic function. Near mid cavity obstruction.  Severely dilated right ventricle.  Right ventricular systolic pressure is estimated to be 80 mmHg.    Cardiac Catheterization:    12/29/18  1.  Pulmonary arterial  hypertension.  2.  Negative response to vasodilator assessment with intravenous adenosine.  3.  Normal pulmonary capillary wedge pressure.    Cardiac MRI  12/28/18  1. No MR evidence of cardiac amyloidosis.  2. Small region of subendocardial delayed enhancement in the inferior LV base, nonspecific.  3. Dilated, hypertrophic right ventricle with moderate to severe decrease in RV function. RVEF of 23%.  4. Early diastolic septal bounce, which may be due to elevated RV pressure or, less likely, a bundle branch block.  5. Normal left ventricular size and function.  6. Trivial to mild pulmonic and tricuspid regurgitation.  7. Mild right atrial enlargement.  8. Small pericardial effusion.      Assessment/Plan  No new Assessment & Plan notes have been filed under this hospital service since the last note was generated.  Service: Cardiology    1. Pulmonary hypertension:  - recommend outpatient PFT   - continue furosemide 20mg qd  -  Follow up to initiate empiric advanced pulmonary arterial hypertension therapy outpatient   - most likely group 3     2. interstitial lung disease:  - smoking cessation discussed   - current tobacco abuse      Future Appointments  Date Time Provider Department Center   3/27/2019 12:40 PM Miguel Newby M.D. [facility] None       Thank you for allowing me to participate in the care of this patient.  Cardiology will sign off on this patient    Please contact me with any questions.    MELANIA Ralph.   Cardiologist, [facility]  [phone]

## 2018-12-31 NOTE — PROGRESS NOTES
[facility] Medicine Daily Progress Note    Date of Service  12/30/2018    Chief Complaint  SHort of breath    Hospital Course    58 y.o. male admitted 12/20/2018 with shortness of breath and increasing leg swelling over the prior 4 weeks.  He required BiPAP therapy in ICU       Interval Problem Update  Continue complaining about diet.  He wanted to liberate salt restriction.  Weaned off oxygen.  Mildly tachycardic.  Stable for discharge per cardiology.  Switch him to p.o. Lasix tomorrow.  Pending pulmonary clearance        Consultants/Specialty  Pulmonary  Cardiology  Oncology  Code Status  FULL    Disposition  Home.  Pending pulmonary clearance    Review of Systems  Review of Systems   Constitutional: Negative for chills, fever and weight loss.   HENT: Negative for congestion, hearing loss and sore throat.    Eyes: Negative for blurred vision, double vision and photophobia.   Respiratory: Negative for cough, sputum production and shortness of breath.    Cardiovascular: Positive for leg swelling. Negative for chest pain, palpitations and orthopnea.   Gastrointestinal: Negative for abdominal pain, blood in stool, constipation, melena, nausea and vomiting.   Genitourinary: Negative for dysuria and hematuria.   Musculoskeletal: Negative for back pain and neck pain.   Skin: Negative for itching and rash.   Neurological: Negative for tingling, speech change and headaches.   Endo/Heme/Allergies: Negative for polydipsia. Does not bruise/bleed easily.   Psychiatric/Behavioral: Negative for depression and suicidal ideas. The patient is not nervous/anxious.    Shortness of breath and leg swelling improving    Physical Exam  Temp:  [36.3 °C (97.3 °F)-37.3 °C (99.1 °F)] 37.3 °C (99.1 °F)  Pulse:  [] 109  Resp:  [16-20] 19  BP: (102-129)/(69-91) 102/69    Physical Exam   Constitutional: He is oriented to person, place, and time. He appears well-developed and well-nourished. No distress.   HENT:   Head: Normocephalic and  atraumatic.   Nose: Nose normal.   Mouth/Throat: Oropharynx is clear and moist.   Eyes: Conjunctivae and EOM are normal. Right eye exhibits no discharge. Left eye exhibits no discharge. No scleral icterus.   Neck: No tracheal deviation present.   Cardiovascular: Normal rate, regular rhythm, normal heart sounds and intact distal pulses.    No murmur heard.  Pulmonary/Chest: Effort normal. No stridor. No respiratory distress. He has no wheezes. He has rales.   Abdominal: Soft. Bowel sounds are normal. He exhibits no distension. There is no tenderness.   Musculoskeletal: He exhibits edema (decreased).   Lymphadenopathy:     He has no cervical adenopathy.   Neurological: He is alert and oriented to person, place, and time. No cranial nerve deficit.   Skin: Skin is warm. He is not diaphoretic.   Psychiatric: He has a normal mood and affect. His behavior is normal. Thought content normal.   Vitals reviewed.  Lower extremity edema improved    Fluids    Intake/Output Summary (Last 24 hours) at 12/30/18 1626  Last data filed at 12/30/18 1247   Gross per 24 hour   Intake             2160 ml   Output             2200 ml   Net              -40 ml       Laboratory      Recent Labs      12/28/18   1025  12/29/18   0240   SODIUM  139  140   POTASSIUM  4.0  4.3   CHLORIDE  96  99   CO2  38*  36*   GLUCOSE  114*  100*   BUN  26*  28*   CREATININE  0.73  0.86   CALCIUM  9.1  8.8                   Imaging  DX-CHEST-2 VIEWS   Final Result      Interval improvement in bibasilar opacities and small pleural effusions.      MR-CARDIAC MORPH/FUNC WITH & W/O   Final Result      1. No MR evidence of cardiac amyloidosis.   2. Small region of subendocardial delayed enhancement in the inferior LV base, nonspecific.   3. Dilated, hypertrophic right ventricle with moderate to severe decrease in RV function. RVEF of 23%.   4. Early diastolic septal bounce, which may be due to elevated RV pressure or, less likely, a bundle branch block.   5. Normal  left ventricular size and function.   6. Trivial to mild pulmonic and tricuspid regurgitation.   7. Mild right atrial enlargement.   8. Small pericardial effusion.      US-CHEST   Final Result      No significant right effusion. Not enough fluid to drain.      US-EXTREMITY VENOUS LOWER UNILAT LEFT   Final Result      DX-CHEST-LIMITED (1 VIEW)   Final Result      Improving bibasilar interstitial edema pattern. Unchanged cardiomegaly and small bilateral pleural effusions.      CT-CTA CHEST PULMONARY ARTERY W/ RECONS   Final Result      1.  No central or segmental pulmonary embolus   2.  Emphysema   3.  Bibasilar atelectasis with superimposed pneumonia not excluded   4.  Small to moderate RIGHT pleural effusion   5.  Small LEFT pleural effusion   6.  Interstitial pulmonary edema or possibly chronic interstitial lung disease such as IPF/UIP.   7.  Ascites   8.  Findings suggestive of pulmonary arterial hypertension   9.  Small pericardial effusion         EC-ECHOCARDIOGRAM COMPLETE W/O CONT   Final Result      DX-CHEST-PORTABLE (1 VIEW)   Final Result         1. Cardiomegaly and bibasilar interstitial/alveolar opacities, right worse than left. They likely represent consolidations and/or edema. Multifocal pneumonia can be considered in the appropriate clinical settings.   2. Small right pleural effusion.      US-EXTREMITY VENOUS LOWER UNILAT RIGHT    (Results Pending)        Assessment/Plan  * Acute respiratory failure with hypoxia and hypercapnia (HCC)   Assessment & Plan    Concern of cardiomyopathy with restriction from hypertrophy  [place] decline acceptance  Diuresing and monitor strict I/O's  Monitor vitals, labs  On BiPAP just overnight as of 12/22  On nasal canula 12/23 am.  Rt protocol  Pulmonary consulting  Moderate pulmonary hypertension with negative reactivity test on angiography  Weaned off oxygen.  Switching to p.o. Lasix.  Might need to reevaluate tomorrow     Abnormal CT scan of lung   Assessment & Plan     12/21 with emphysema and possible ILD findings; bilateral pleural effusion  Pulmonary following.  Will need outpatient pulmonary follow-up     GI bleed   Assessment & Plan    Bloody mucus-like per RN  No sign of hemorrhoid on external exam 12/23.  Possible internal hemorrhoids. Start Anusol cream  Needs future colonoscopy once stable, outpatient  Monitor Hgb  12/21 Hgb:12.6  12/22 Hgb:13.7  12/23 Hgb: 14.5 appears to be heme concentrating with diuresis  INR 1.1  Hemoglobin remained stable     Pulmonary hypertension (HCC)   Assessment & Plan    RVSP: 80mmHg  CTA negative for PE  Supplemental oxygen  Cardiology consulted  Moderate pulmonary hypertension with negative vasoreactivity     Acute on chronic right-sided heart failure (HCC)   Assessment & Plan    Cardiology consulting.  Diuresis, strict I/O's, monitor labs  IMproving peripheral edema.  12/24 Total output since admit: -2.1L  Checking CXR as has ongoing decreased breath sounds right lung on exam  12/21 Echo: Left ventricle is small in size. Severe concentric left ventricular   hypertrophy. Hyperdynamic left ventricular systolic function. Left   ventricular ejection fraction is visually estimated to be greater than   75%. Abnormal septal motion consistent with right ventricular (RV)   volume overload and/or elevated RV end-diastolic pressure.   Indeterminate diastolic function. Near mid cavity obstruction.  Severely dilated right ventricle.  Right ventricular systolic pressure is estimated to be 80 mmHg.  Pulmonary hypertension probably secondary to pulmonary disease.  Heart catheterization considered while in the hospital  Diastolic heart failure: MRI of the heart negative for amyloidosis.  Transthyretin test pending  UPEP with polyclonal spikes,  SPEP w/o spikes. Hematology signed off  Moderate pulmonary hypertension on heart catheterization, negative reactivity test     Elevated troponin   Assessment & Plan    C/O CHF as etiology  monitor     Hypercapnia    Assessment & Plan    On BiPAP last night  On nasal canula 12/23am  Monitor abg  Pulmonary consulted     Pleural effusion, bilateral   Assessment & Plan    Not enough fluid to drain     Hypotension   Assessment & Plan    Resolved          VTE prophylaxis: SCD

## 2018-12-31 NOTE — DISCHARGE INSTRUCTIONS
Discharge Instructions    Discharged to home by car with self. Discharged via wheelchair, hospital escort: Yes.  Special equipment needed: Not Applicable    Be sure to schedule a follow-up appointment with your primary care doctor or any specialists as instructed.     Discharge Plan:   Diet Plan: Discussed  Activity Level: Discussed  Smoking Cessation Offered: Patient Counseled  Confirmed Follow up Appointment: Patient to Call and Schedule Appointment  Medication Reconciliation Updated: Yes  Pneumococcal Vaccine Administered/Refused: Given (See MAR)  Influenza Vaccine Indication: Patient Refuses    I understand that a diet low in cholesterol, fat, and sodium is recommended for good health. Unless I have been given specific instructions below for another diet, I accept this instruction as my diet prescription.   Other diet: Sodium limited    Special Instructions: None    · Is patient discharged on Warfarin / Coumadin?   No       Edema  Edema is an abnormal buildup of fluids. It is more common in your legs and thighs. Painless swelling of the feet and ankles is more likely as a person ages. It also is common in looser skin, like around your eyes.  Follow these instructions at home:  · Keep the affected body part above the level of the heart while lying down.  · Do not sit still or stand for a long time.  · Do not put anything right under your knees when you lie down.  · Do not wear tight clothes on your upper legs.  · Exercise your legs to help the puffiness (swelling) go down.  · Wear elastic bandages or support stockings as told by your doctor.  · A low-salt diet may help lessen the puffiness.  · Only take medicine as told by your doctor.  Contact a doctor if:  · Treatment is not working.  · You have heart, liver, or kidney disease and notice that your skin looks puffy or shiny.  · You have puffiness in your legs that does not get better when you raise your legs.  · You have sudden weight gain for no reason.  Get  help right away if:  · You have shortness of breath or chest pain.  · You cannot breathe when you lie down.  · You have pain, redness, or warmth in the areas that are puffy.  · You have heart, liver, or kidney disease and get edema all of a sudden.  · You have a fever and your symptoms get worse all of a sudden.  This information is not intended to replace advice given to you by your health care provider. Make sure you discuss any questions you have with your health care provider.  Document Released: 06/05/2009 Document Revised: 05/25/2017 Document Reviewed: 10/10/2014  [employer] Interactive Patient Education © 2017 [employer]    Shortness of Breath  Shortness of breath means you have trouble breathing. Shortness of breath needs medical care right away.  HOME CARE   · Do not smoke.  · Avoid being around chemicals or things (paint fumes, dust) that may bother your breathing.  · Rest as needed. Slowly begin your normal activities.  · Only take medicines as told by your doctor.  · Keep all doctor visits as told.  GET HELP RIGHT AWAY IF:   · Your shortness of breath gets worse.  · You feel lightheaded, pass out (faint), or have a cough that is not helped by medicine.  · You cough up blood.  · You have pain with breathing.  · You have pain in your chest, arms, shoulders, or belly (abdomen).  · You have a fever.  · You cannot walk up stairs or exercise the way you normally do.  · You do not get better in the time expected.  · You have a hard time doing normal activities even with rest.  · You have problems with your medicines.  · You have any new symptoms.  MAKE SURE YOU:  · Understand these instructions.  · Will watch your condition.  · Will get help right away if you are not doing well or get worse.  This information is not intended to replace advice given to you by your health care provider. Make sure you discuss any questions you have with your health care provider.  Document Released: 06/05/2009 Document Revised:  12/23/2014 Document Reviewed: 03/04/2013  [employer] Interactive Patient Education © 2017 [employer]      Depression / Suicide Risk    As you are discharged from this [facility] facility, it is important to learn how to keep safe from harming yourself.    Recognize the warning signs:  · Abrupt changes in personality, positive or negative- including increase in energy   · Giving away possessions  · Change in eating patterns- significant weight changes-  positive or negative  · Change in sleeping patterns- unable to sleep or sleeping all the time   · Unwillingness or inability to communicate  · Depression  · Unusual sadness, discouragement and loneliness  · Talk of wanting to die  · Neglect of personal appearance   · Rebelliousness- reckless behavior  · Withdrawal from people/activities they love  · Confusion- inability to concentrate     If you or a loved one observes any of these behaviors or has concerns about self-harm, here's what you can do:  · Talk about it- your feelings and reasons for harming yourself  · Remove any means that you might use to hurt yourself (examples: pills, rope, extension cords, firearm)  · Get professional help from the community (Mental Health, Substance Abuse, psychological counseling)  · Do not be alone:Call your Safe Contact- someone whom you trust who will be there for you.  · Call your local CRISIS HOTLINE 279-9674 or 745-622-4685  · Call your local Children's Mobile Crisis Response Team [place] (284) 720-7905 or www.[employer]  · Call the toll free National Suicide Prevention Hotlines   · National Suicide Prevention Lifeline 846-629-URXA (8461)  · National Hope Line Network 800-SUICIDE (478-2198)

## 2018-12-31 NOTE — PROGRESS NOTES
Patient left floor via wheelchair to LYNDON Tellez with RN. Patient removed from tele monitor, monitor room notified and monitor returned. Patient verbalized understanding of all discharge instructions. Patient left with all belongings in hand.

## 2019-01-01 NOTE — DISCHARGE SUMMARY
Discharge Summary    CHIEF COMPLAINT ON ADMISSION  Chief Complaint   Patient presents with   • Shortness of Breath     x 1 week progressively getting worse. arrived in triage hypoxic on ra w/sat of 60%. placed on 6l/nc still hypoxic 88%. pt able to speak in full sentences.   • Cough     productive cough x 1 week. states coughing up greenish sputum       Reason for Admission  SOB;     Admission Date  12/20/2018    CODE STATUS  Full code    HPI & HOSPITAL COURSE  This is a 58 y.o. male with history of smoking tobacco was initially presented to the hospital with complaints of shortness of breath, orthopnea, increasing swelling in the legs in the last 1 month.  He was admitted with clinical diagnosis of acute heart failure and acute hypoxic respiratory failure.  For details of admission see H&P.    He required ICU stay for BiPAP treatment.  Subsequent evaluation revealed  Moderate pulmonary hypertension with severely dilated right ventricle, preserved ejection fraction 75%, consistent with diastolic heart failure.  CAT scan of the chest showed emphysema and possible ILD and bilateral pleural effusion.  CTA of pulmonary artery was negative for pulmonary embolism   Cardiology and pulmonary medicine consulted.  Patient had MRI of the heart, that did not reveal amyloidosis.  Oncology consulted for possible amyloidosis, but diagnosis was dismissed due to absence of MRI findings and absence of monoclonal spike on serum protein electrophoresis.  Patient was treated with IV diuresis with improvement of bilateral lower extremity edema and shortness of breath.  Gradually he was weaned off oxygen.  Heart catheterization revealed moderate pulmonary hypertension with negative vasoreactivity.  Patient had episode of hemorrhoidal bleeding in the hospital, resolved.  Patient needs to follow in pulmonary clinic for PFT and 6-minute walking test.  Follow with cardiology for heart failure and initiation of advanced pulmonary hypertension  therapy.  Follow-up with GI for elective colonoscopy.  Follow-up with PCP      Therefore, he is discharged in fair and stable condition to home with close outpatient follow-up.    The patient met 2-midnight criteria for an inpatient stay at the time of discharge.    Discharge Date  12/31/2018    FOLLOW UP ITEMS POST DISCHARGE  Cardiology  Pulmonary  PCP    DISCHARGE DIAGNOSES  Principal Problem:    Acute respiratory failure with hypoxia and hypercapnia (HCC) POA: Unknown  Active Problems:    Acute on chronic right-sided heart failure (HCC) POA: Unknown    Pulmonary hypertension (HCC) POA: Unknown    GI bleed POA: Unknown    Abnormal CT scan of lung POA: Unknown    Hypercapnia POA: Unknown    Elevated troponin POA: Unknown    Hypotension POA: Unknown    Pleural effusion, bilateral POA: Unknown  Resolved Problems:    * No resolved hospital problems. *      FOLLOW UP  Future Appointments  Date Time Provider Department Center   3/27/2019 12:40 PM Miguel Newby M.D. [facility] None     [address] [address]  [phone]  Go on 1/4/2019  PLEASE WALK IN AT 9AM TO BE SEEN FOR HEART FAILURE FOLLOW UP AND TO ESTABLISH A NEW PCP. THANK YOU      MEDICATIONS ON DISCHARGE     Medication List      START taking these medications      Instructions   albuterol 108 (90 Base) MCG/ACT Aers inhalation aerosol   Inhale 2 Puffs by mouth every 6 hours as needed for Shortness of Breath.  Dose:  2 Puff     fish oil 1000 MG Caps capsule   Take 1 Cap by mouth 3 times a day, with meals.  Dose:  1000 mg     furosemide 20 MG Tabs  Start taking on:  1/1/2019  Commonly known as:  LASIX   Take 1 Tab by mouth every day.  Dose:  20 mg        STOP taking these medications    ibuprofen 200 MG Tabs  Commonly known as:  MOTRIN            Allergies  No Known Allergies    DIET  No orders of the defined types were placed in this encounter.      ACTIVITY  As tolerated.  Weight bearing as  tolerated    CONSULTATIONS  Cardiology  Pulmonary  Hematology oncology  PROCEDURES  1.  Right heart catheterization.  2.  Pulmonary vascular vasoreactivity assessment with intravenous adenosine.  3.  Right internal jugular vein approach.  4.  CONSCIOUS SEDATION       LABORATORY  Lab Results   Component Value Date    SODIUM 139 12/31/2018    POTASSIUM 4.1 12/31/2018    CHLORIDE 101 12/31/2018    CO2 35 (H) 12/31/2018    GLUCOSE 147 (H) 12/31/2018    BUN 26 (H) 12/31/2018    CREATININE 0.91 12/31/2018        Lab Results   Component Value Date    WBC 12.3 (H) 12/25/2018    HEMOGLOBIN 14.4 12/25/2018    HEMATOCRIT 43.2 12/25/2018    PLATELETCT 269 12/25/2018      DX-CHEST-2 VIEWS   Final Result      Interval improvement in bibasilar opacities and small pleural effusions.      MR-CARDIAC MORPH/FUNC WITH & W/O   Final Result      1. No MR evidence of cardiac amyloidosis.   2. Small region of subendocardial delayed enhancement in the inferior LV base, nonspecific.   3. Dilated, hypertrophic right ventricle with moderate to severe decrease in RV function. RVEF of 23%.   4. Early diastolic septal bounce, which may be due to elevated RV pressure or, less likely, a bundle branch block.   5. Normal left ventricular size and function.   6. Trivial to mild pulmonic and tricuspid regurgitation.   7. Mild right atrial enlargement.   8. Small pericardial effusion.      US-CHEST   Final Result      No significant right effusion. Not enough fluid to drain.      US-EXTREMITY VENOUS LOWER UNILAT LEFT   Final Result      DX-CHEST-LIMITED (1 VIEW)   Final Result      Improving bibasilar interstitial edema pattern. Unchanged cardiomegaly and small bilateral pleural effusions.      CT-CTA CHEST PULMONARY ARTERY W/ RECONS   Final Result      1.  No central or segmental pulmonary embolus   2.  Emphysema   3.  Bibasilar atelectasis with superimposed pneumonia not excluded   4.  Small to moderate RIGHT pleural effusion   5.  Small LEFT pleural  effusion   6.  Interstitial pulmonary edema or possibly chronic interstitial lung disease such as IPF/UIP.   7.  Ascites   8.  Findings suggestive of pulmonary arterial hypertension   9.  Small pericardial effusion         EC-ECHOCARDIOGRAM COMPLETE W/O CONT   Final Result      DX-CHEST-PORTABLE (1 VIEW)   Final Result         1. Cardiomegaly and bibasilar interstitial/alveolar opacities, right worse than left. They likely represent consolidations and/or edema. Multifocal pneumonia can be considered in the appropriate clinical settings.   2. Small right pleural effusion.      US-EXTREMITY VENOUS LOWER UNILAT RIGHT    (Results Pending)     CONCLUSIONS  No prior study is available for comparison.   Left ventricle is small in size. Severe concentric left ventricular   hypertrophy. Hyperdynamic left ventricular systolic function. Left   ventricular ejection fraction is visually estimated to be greater than   75%. Abnormal septal motion consistent with right ventricular (RV)   volume overload and/or elevated RV end-diastolic pressure.   Indeterminate diastolic function. Near mid cavity obstruction.  Severely dilated right ventricle.  Right ventricular systolic pressure is estimated to be 80 mmHg.     PROCEDURES:   Bilateral lower extremity venous duplex imaging.    The following venous structures were evaluated: common femoral, profunda    femoral,  femoral, popliteal , peroneal and posterior tibial veins.    Serial compression, augmentation maneuvers,  color and spectral Doppler    flow evaluations were performed.      FINDINGS:   Bilateral lower extremities -.    Complete color filling and compressibility with normal venous flow dynamics    including spontaneous flow, response to augmentation maneuvers, and    respiratory phasicity.    No evidence of  deep venous thrombosis bilaterally.     Total time of the discharge process exceeds 46 minutes.

## 2019-01-02 ENCOUNTER — TELEPHONE (OUTPATIENT)
Dept: CARDIOLOGY | Facility: MEDICAL CENTER | Age: 59
End: 2019-01-02

## 2019-01-02 ENCOUNTER — PATIENT OUTREACH (OUTPATIENT)
Dept: HEALTH INFORMATION MANAGEMENT | Facility: OTHER | Age: 59
End: 2019-01-02

## 2019-01-02 NOTE — TELEPHONE ENCOUNTER
lab needs info   Received: Today   Message Contents   Tika Cornell RSharifN.   Phone Number: [phone]             JOHANA/jailene aRtliff from [facility] calling for more information about familial trans thyretin test that JOHANA ordered on 12/24     Please call Evy [phone]      Attempted to contact, Evy unavailable.  Left direct ext for her to call back.     _______    1123:  Lab called back.  Forms need to be filled out for family history.  Form to be faxed to [phone]

## 2019-01-08 LAB — TEST NAME 95000: NORMAL

## 2019-01-09 NOTE — ADDENDUM NOTE
Encounter addended by: Rosetta Feliz R.N. on: 1/9/2019 11:40 AM<BR>    Actions taken: Flowsheet accepted

## 2019-01-11 ENCOUNTER — HOSPITAL ENCOUNTER (EMERGENCY)
Facility: MEDICAL CENTER | Age: 59
End: 2019-01-12

## 2019-01-25 LAB
A FLAVUS AB SER QL ID: NORMAL
A FUMIGATUS2 AB SER QL: NORMAL
A FUMIGATUS3 AB SER QL ID: NORMAL
LACEYELLA SACCHARI AB SER QL: NORMAL
S VIRIDIS AB SER QL: NORMAL
T CANDIDUS AB SER QL: NORMAL

## 2019-04-13 ENCOUNTER — APPOINTMENT (OUTPATIENT)
Dept: RADIOLOGY | Facility: MEDICAL CENTER | Age: 59
DRG: 291 | End: 2019-04-13
Attending: EMERGENCY MEDICINE
Payer: MEDICAID

## 2019-04-13 ENCOUNTER — HOSPITAL ENCOUNTER (INPATIENT)
Facility: MEDICAL CENTER | Age: 59
LOS: 2 days | DRG: 291 | End: 2019-04-16
Attending: EMERGENCY MEDICINE | Admitting: HOSPITALIST
Payer: MEDICAID

## 2019-04-13 LAB
APTT PPP: 29.8 SEC (ref 24.7–36)
BASOPHILS # BLD AUTO: 0.3 % (ref 0–1.8)
BASOPHILS # BLD: 0.04 K/UL (ref 0–0.12)
BNP SERPL-MCNC: 684 PG/ML (ref 0–100)
EOSINOPHIL # BLD AUTO: 0.05 K/UL (ref 0–0.51)
EOSINOPHIL NFR BLD: 0.4 % (ref 0–6.9)
ERYTHROCYTE [DISTWIDTH] IN BLOOD BY AUTOMATED COUNT: 44.6 FL (ref 35.9–50)
HCT VFR BLD AUTO: 42.9 % (ref 42–52)
HGB BLD-MCNC: 14.8 G/DL (ref 14–18)
IMM GRANULOCYTES # BLD AUTO: 0.04 K/UL (ref 0–0.11)
IMM GRANULOCYTES NFR BLD AUTO: 0.3 % (ref 0–0.9)
INR PPP: 1.08 (ref 0.87–1.13)
LYMPHOCYTES # BLD AUTO: 1.44 K/UL (ref 1–4.8)
LYMPHOCYTES NFR BLD: 12.5 % (ref 22–41)
MCH RBC QN AUTO: 30.1 PG (ref 27–33)
MCHC RBC AUTO-ENTMCNC: 34.5 G/DL (ref 33.7–35.3)
MCV RBC AUTO: 87.2 FL (ref 81.4–97.8)
MONOCYTES # BLD AUTO: 0.72 K/UL (ref 0–0.85)
MONOCYTES NFR BLD AUTO: 6.2 % (ref 0–13.4)
NEUTROPHILS # BLD AUTO: 9.25 K/UL (ref 1.82–7.42)
NEUTROPHILS NFR BLD: 80.3 % (ref 44–72)
NRBC # BLD AUTO: 0 K/UL
NRBC BLD-RTO: 0 /100 WBC
PLATELET # BLD AUTO: 284 K/UL (ref 164–446)
PMV BLD AUTO: 9.4 FL (ref 9–12.9)
PROTHROMBIN TIME: 14.1 SEC (ref 12–14.6)
RBC # BLD AUTO: 4.92 M/UL (ref 4.7–6.1)
TROPONIN I SERPL-MCNC: 0.05 NG/ML (ref 0–0.04)
WBC # BLD AUTO: 11.5 K/UL (ref 4.8–10.8)

## 2019-04-13 PROCEDURE — 700111 HCHG RX REV CODE 636 W/ 250 OVERRIDE (IP): Performed by: EMERGENCY MEDICINE

## 2019-04-13 PROCEDURE — 700102 HCHG RX REV CODE 250 W/ 637 OVERRIDE(OP)

## 2019-04-13 PROCEDURE — 71045 X-RAY EXAM CHEST 1 VIEW: CPT

## 2019-04-13 PROCEDURE — 83690 ASSAY OF LIPASE: CPT

## 2019-04-13 PROCEDURE — 93005 ELECTROCARDIOGRAM TRACING: CPT | Performed by: EMERGENCY MEDICINE

## 2019-04-13 PROCEDURE — 85025 COMPLETE CBC W/AUTO DIFF WBC: CPT

## 2019-04-13 PROCEDURE — 84484 ASSAY OF TROPONIN QUANT: CPT

## 2019-04-13 PROCEDURE — A9270 NON-COVERED ITEM OR SERVICE: HCPCS

## 2019-04-13 PROCEDURE — 99285 EMERGENCY DEPT VISIT HI MDM: CPT

## 2019-04-13 PROCEDURE — 85610 PROTHROMBIN TIME: CPT

## 2019-04-13 PROCEDURE — 80053 COMPREHEN METABOLIC PANEL: CPT

## 2019-04-13 PROCEDURE — 85730 THROMBOPLASTIN TIME PARTIAL: CPT

## 2019-04-13 PROCEDURE — 83880 ASSAY OF NATRIURETIC PEPTIDE: CPT

## 2019-04-13 PROCEDURE — 93005 ELECTROCARDIOGRAM TRACING: CPT

## 2019-04-13 PROCEDURE — 304561 HCHG STAT O2

## 2019-04-13 PROCEDURE — 96374 THER/PROPH/DIAG INJ IV PUSH: CPT

## 2019-04-13 PROCEDURE — 36415 COLL VENOUS BLD VENIPUNCTURE: CPT

## 2019-04-13 RX ORDER — ASPIRIN 81 MG/1
TABLET, CHEWABLE ORAL
Status: COMPLETED
Start: 2019-04-13 | End: 2019-04-13

## 2019-04-13 RX ORDER — ASPIRIN 81 MG/1
324 TABLET, CHEWABLE ORAL ONCE
Status: COMPLETED | OUTPATIENT
Start: 2019-04-13 | End: 2019-04-13

## 2019-04-13 RX ORDER — ASPIRIN 81 MG/1
324 TABLET, CHEWABLE ORAL ONCE
Status: DISCONTINUED | OUTPATIENT
Start: 2019-04-13 | End: 2019-04-13

## 2019-04-13 RX ORDER — FUROSEMIDE 10 MG/ML
40 INJECTION INTRAMUSCULAR; INTRAVENOUS ONCE
Status: COMPLETED | OUTPATIENT
Start: 2019-04-13 | End: 2019-04-13

## 2019-04-13 RX ADMIN — ASPIRIN 324 MG: 81 TABLET, CHEWABLE ORAL at 22:45

## 2019-04-13 RX ADMIN — FUROSEMIDE 40 MG: 10 INJECTION, SOLUTION INTRAMUSCULAR; INTRAVENOUS at 23:15

## 2019-04-13 RX ADMIN — ASPIRIN 81 MG 324 MG: 81 TABLET ORAL at 22:45

## 2019-04-14 ENCOUNTER — APPOINTMENT (OUTPATIENT)
Dept: CARDIOLOGY | Facility: MEDICAL CENTER | Age: 59
DRG: 291 | End: 2019-04-14
Attending: HOSPITALIST
Payer: MEDICAID

## 2019-04-14 PROBLEM — Z72.0 TOBACCO ABUSE: Status: ACTIVE | Noted: 2019-04-14

## 2019-04-14 PROBLEM — D72.829 LEUKOCYTOSIS: Status: ACTIVE | Noted: 2019-04-14

## 2019-04-14 PROBLEM — J44.9 COPD (CHRONIC OBSTRUCTIVE PULMONARY DISEASE) (HCC): Status: ACTIVE | Noted: 2019-04-14

## 2019-04-14 LAB
ALBUMIN SERPL BCP-MCNC: 3.5 G/DL (ref 3.2–4.9)
ALBUMIN/GLOB SERPL: 1.3 G/DL
ALP SERPL-CCNC: 87 U/L (ref 30–99)
ALT SERPL-CCNC: 16 U/L (ref 2–50)
ANION GAP SERPL CALC-SCNC: 4 MMOL/L (ref 0–11.9)
AST SERPL-CCNC: 24 U/L (ref 12–45)
BILIRUB SERPL-MCNC: 0.8 MG/DL (ref 0.1–1.5)
BLOOD CULTURE HOLD CXBCH: NORMAL
BLOOD CULTURE HOLD CXBCH: NORMAL
BUN SERPL-MCNC: 21 MG/DL (ref 8–22)
CALCIUM SERPL-MCNC: 8.8 MG/DL (ref 8.5–10.5)
CHLORIDE SERPL-SCNC: 101 MMOL/L (ref 96–112)
CO2 SERPL-SCNC: 34 MMOL/L (ref 20–33)
CREAT SERPL-MCNC: 0.93 MG/DL (ref 0.5–1.4)
EKG IMPRESSION: NORMAL
GLOBULIN SER CALC-MCNC: 2.8 G/DL (ref 1.9–3.5)
GLUCOSE SERPL-MCNC: 115 MG/DL (ref 65–99)
LIPASE SERPL-CCNC: 145 U/L (ref 11–82)
POTASSIUM SERPL-SCNC: 4 MMOL/L (ref 3.6–5.5)
PROCALCITONIN SERPL-MCNC: <0.05 NG/ML
PROT SERPL-MCNC: 6.3 G/DL (ref 6–8.2)
SODIUM SERPL-SCNC: 139 MMOL/L (ref 135–145)
TROPONIN I SERPL-MCNC: 0.04 NG/ML (ref 0–0.04)
TROPONIN I SERPL-MCNC: 0.04 NG/ML (ref 0–0.04)

## 2019-04-14 PROCEDURE — 96372 THER/PROPH/DIAG INJ SC/IM: CPT

## 2019-04-14 PROCEDURE — 96376 TX/PRO/DX INJ SAME DRUG ADON: CPT

## 2019-04-14 PROCEDURE — 84484 ASSAY OF TROPONIN QUANT: CPT

## 2019-04-14 PROCEDURE — 700111 HCHG RX REV CODE 636 W/ 250 OVERRIDE (IP): Performed by: HOSPITALIST

## 2019-04-14 PROCEDURE — 770020 HCHG ROOM/CARE - TELE (206)

## 2019-04-14 PROCEDURE — 700102 HCHG RX REV CODE 250 W/ 637 OVERRIDE(OP): Performed by: EMERGENCY MEDICINE

## 2019-04-14 PROCEDURE — 93306 TTE W/DOPPLER COMPLETE: CPT

## 2019-04-14 PROCEDURE — 99220 PR INITIAL OBSERVATION CARE,LEVL III: CPT | Mod: 25 | Performed by: HOSPITALIST

## 2019-04-14 PROCEDURE — A9270 NON-COVERED ITEM OR SERVICE: HCPCS | Performed by: HOSPITALIST

## 2019-04-14 PROCEDURE — 36415 COLL VENOUS BLD VENIPUNCTURE: CPT

## 2019-04-14 PROCEDURE — 99407 BEHAV CHNG SMOKING > 10 MIN: CPT | Performed by: HOSPITALIST

## 2019-04-14 PROCEDURE — 700102 HCHG RX REV CODE 250 W/ 637 OVERRIDE(OP): Performed by: HOSPITALIST

## 2019-04-14 PROCEDURE — 93306 TTE W/DOPPLER COMPLETE: CPT | Mod: 26 | Performed by: INTERNAL MEDICINE

## 2019-04-14 PROCEDURE — A9270 NON-COVERED ITEM OR SERVICE: HCPCS | Performed by: EMERGENCY MEDICINE

## 2019-04-14 PROCEDURE — 94760 N-INVAS EAR/PLS OXIMETRY 1: CPT

## 2019-04-14 PROCEDURE — 84145 PROCALCITONIN (PCT): CPT

## 2019-04-14 RX ORDER — POLYETHYLENE GLYCOL 3350 17 G/17G
1 POWDER, FOR SOLUTION ORAL
Status: DISCONTINUED | OUTPATIENT
Start: 2019-04-14 | End: 2019-04-16 | Stop reason: HOSPADM

## 2019-04-14 RX ORDER — AMOXICILLIN 250 MG
2 CAPSULE ORAL 2 TIMES DAILY
Status: DISCONTINUED | OUTPATIENT
Start: 2019-04-14 | End: 2019-04-16 | Stop reason: HOSPADM

## 2019-04-14 RX ORDER — BISACODYL 10 MG
10 SUPPOSITORY, RECTAL RECTAL
Status: DISCONTINUED | OUTPATIENT
Start: 2019-04-14 | End: 2019-04-16 | Stop reason: HOSPADM

## 2019-04-14 RX ORDER — OXYCODONE HYDROCHLORIDE 10 MG/1
10 TABLET ORAL
Status: DISCONTINUED | OUTPATIENT
Start: 2019-04-14 | End: 2019-04-16 | Stop reason: HOSPADM

## 2019-04-14 RX ORDER — LISINOPRIL 5 MG/1
5 TABLET ORAL DAILY
Status: DISCONTINUED | OUTPATIENT
Start: 2019-04-14 | End: 2019-04-14

## 2019-04-14 RX ORDER — CHLORAL HYDRATE 500 MG
1000 CAPSULE ORAL
Status: DISCONTINUED | OUTPATIENT
Start: 2019-04-14 | End: 2019-04-16 | Stop reason: HOSPADM

## 2019-04-14 RX ORDER — FUROSEMIDE 10 MG/ML
40 INJECTION INTRAMUSCULAR; INTRAVENOUS
Status: DISCONTINUED | OUTPATIENT
Start: 2019-04-14 | End: 2019-04-16 | Stop reason: HOSPADM

## 2019-04-14 RX ORDER — OXYCODONE HYDROCHLORIDE 5 MG/1
5 TABLET ORAL
Status: DISCONTINUED | OUTPATIENT
Start: 2019-04-14 | End: 2019-04-16 | Stop reason: HOSPADM

## 2019-04-14 RX ORDER — MORPHINE SULFATE 4 MG/ML
4 INJECTION, SOLUTION INTRAMUSCULAR; INTRAVENOUS
Status: DISCONTINUED | OUTPATIENT
Start: 2019-04-14 | End: 2019-04-16 | Stop reason: HOSPADM

## 2019-04-14 RX ORDER — HEPARIN SODIUM 5000 [USP'U]/ML
5000 INJECTION, SOLUTION INTRAVENOUS; SUBCUTANEOUS EVERY 8 HOURS
Status: DISCONTINUED | OUTPATIENT
Start: 2019-04-14 | End: 2019-04-16 | Stop reason: HOSPADM

## 2019-04-14 RX ADMIN — NITROGLYCERIN 0.5 INCH: 20 OINTMENT TOPICAL at 23:36

## 2019-04-14 RX ADMIN — HEPARIN SODIUM 5000 UNITS: 5000 INJECTION, SOLUTION INTRAVENOUS; SUBCUTANEOUS at 20:20

## 2019-04-14 RX ADMIN — NITROGLYCERIN 0.5 INCH: 20 OINTMENT TOPICAL at 06:01

## 2019-04-14 RX ADMIN — FUROSEMIDE 40 MG: 10 INJECTION, SOLUTION INTRAMUSCULAR; INTRAVENOUS at 01:00

## 2019-04-14 RX ADMIN — HEPARIN SODIUM 5000 UNITS: 5000 INJECTION, SOLUTION INTRAVENOUS; SUBCUTANEOUS at 06:01

## 2019-04-14 RX ADMIN — HEPARIN SODIUM 5000 UNITS: 5000 INJECTION, SOLUTION INTRAVENOUS; SUBCUTANEOUS at 15:03

## 2019-04-14 RX ADMIN — NITROGLYCERIN 0.5 INCH: 20 OINTMENT TOPICAL at 12:14

## 2019-04-14 RX ADMIN — NITROGLYCERIN 0.5 INCH: 20 OINTMENT TOPICAL at 00:00

## 2019-04-14 RX ADMIN — FUROSEMIDE 40 MG: 10 INJECTION, SOLUTION INTRAMUSCULAR; INTRAVENOUS at 06:01

## 2019-04-14 RX ADMIN — OMEGA-3 FATTY ACIDS CAP 1000 MG 1000 MG: 1000 CAP at 17:21

## 2019-04-14 RX ADMIN — NITROGLYCERIN 0.5 INCH: 20 OINTMENT TOPICAL at 17:23

## 2019-04-14 RX ADMIN — FUROSEMIDE 40 MG: 10 INJECTION, SOLUTION INTRAMUSCULAR; INTRAVENOUS at 15:03

## 2019-04-14 ASSESSMENT — ENCOUNTER SYMPTOMS
PALPITATIONS: 0
FOCAL WEAKNESS: 0
DIZZINESS: 0
HEARTBURN: 0
ORTHOPNEA: 1
MYALGIAS: 0
CHILLS: 0
DOUBLE VISION: 0
FEVER: 0
FLANK PAIN: 0
SPEECH CHANGE: 0
ABDOMINAL PAIN: 0
EYE DISCHARGE: 0
BLURRED VISION: 0
NAUSEA: 0
HEMOPTYSIS: 0
DEPRESSION: 0
SENSORY CHANGE: 0
HALLUCINATIONS: 0
BRUISES/BLEEDS EASILY: 0
COUGH: 0
SHORTNESS OF BREATH: 1
VOMITING: 0
WEAKNESS: 1

## 2019-04-14 ASSESSMENT — COGNITIVE AND FUNCTIONAL STATUS - GENERAL
DAILY ACTIVITIY SCORE: 24
SUGGESTED CMS G CODE MODIFIER MOBILITY: CH
SUGGESTED CMS G CODE MODIFIER DAILY ACTIVITY: CH
MOBILITY SCORE: 24

## 2019-04-14 ASSESSMENT — LIFESTYLE VARIABLES
SUBSTANCE_ABUSE: 0
EVER_SMOKED: YES
ALCOHOL_USE: NO

## 2019-04-14 ASSESSMENT — COPD QUESTIONNAIRES
COPD SCREENING SCORE: 3
HAVE YOU SMOKED AT LEAST 100 CIGARETTES IN YOUR ENTIRE LIFE: YES
DO YOU EVER COUGH UP ANY MUCUS OR PHLEGM?: NO/ONLY WITH OCCASIONAL COLDS OR INFECTIONS
DURING THE PAST 4 WEEKS HOW MUCH DID YOU FEEL SHORT OF BREATH: NONE/LITTLE OF THE TIME

## 2019-04-14 NOTE — ED PROVIDER NOTES
"ED Provider Note    CHIEF COMPLAINT  No chief complaint on file.  Difficulty with breathing    HPI  Alexis Georges is a 58 y.o. male who presents with difficulty of breathing.  The patient states he was driving this evening when he had shortness of breath as well as some chest pressure.  He states he has a known history of heart failure but has been compliant with his medication.  He does have swelling to his legs but nothing that is acute.  He has not been a recent road trips nor does he have any risk factors from a DVT standpoint.  He states he does have some tightness in his chest.  He does not have any nausea nor diaphoresis.  The patient denies associated abdominal pain.  He has had a slight cough but no fevers.    REVIEW OF SYSTEMS  See HPI for further details. All other systems are negative.     PAST MEDICAL HISTORY  No past medical history on file.    FAMILY HISTORY  [unfilled]    SOCIAL HISTORY  Social History     Social History   • Marital status:      Spouse name: N/A   • Number of children: N/A   • Years of education: N/A     Social History Main Topics   • Smoking status: Current Some Day Smoker     Packs/day: 0.10   • Smokeless tobacco: Never Used   • Alcohol use Not on file   • Drug use: Unknown   • Sexual activity: Not on file     Other Topics Concern   • Not on file     Social History Narrative   • No narrative on file       SURGICAL HISTORY  No past surgical history on file.    CURRENT MEDICATIONS  Home Medications    **Home medications have not yet been reviewed for this encounter**         ALLERGIES  No Known Allergies    PHYSICAL EXAM  VITAL SIGNS: /107   Pulse 95   Temp 36.2 °C (97.2 °F) (Temporal)   Resp (!) 33   Ht 1.93 m (6' 4\")   Wt 72.6 kg (160 lb)   SpO2 93%   BMI 19.48 kg/m²       Constitutional: In respiratory distress.   HENT: Normocephalic, Atraumatic, Bilateral external ears normal, Oropharynx moist, No oral exudates, Nose normal.   Eyes: PERRLA, EOMI, Conjunctiva " normal, No discharge.   Neck: Normal range of motion, No tenderness, Supple, No stridor.   Lymphatic: No lymphadenopathy noted.   Cardiovascular: Normal heart rate, Normal rhythm, No murmurs, No rubs, No gallops.   Thorax & Lungs: Symmetrically diminished throughout, tachypnea, no focal asymmetry to auscultation  Abdomen: Bowel sounds normal, Soft, No tenderness, No masses, No pulsatile masses.   Skin: Warm, Dry, No erythema, No rash.   Back: No tenderness, No CVA tenderness.   Extremities: Intact distal pulses, bilateral lower extremity edema, No tenderness, No cyanosis, No clubbing.   Musculoskeletal: Good range of motion in all major joints. No tenderness to palpation or major deformities noted.   Neurologic: Alert & oriented x 3, Normal motor function, Normal sensory function, No focal deficits noted.   Psychiatric: Affect normal, Judgment normal, Mood normal.     Results for orders placed or performed during the hospital encounter of 04/13/19   CBC w/ Differential   Result Value Ref Range    WBC 11.5 (H) 4.8 - 10.8 K/uL    RBC 4.92 4.70 - 6.10 M/uL    Hemoglobin 14.8 14.0 - 18.0 g/dL    Hematocrit 42.9 42.0 - 52.0 %    MCV 87.2 81.4 - 97.8 fL    MCH 30.1 27.0 - 33.0 pg    MCHC 34.5 33.7 - 35.3 g/dL    RDW 44.6 35.9 - 50.0 fL    Platelet Count 284 164 - 446 K/uL    MPV 9.4 9.0 - 12.9 fL    Neutrophils-Polys 80.30 (H) 44.00 - 72.00 %    Lymphocytes 12.50 (L) 22.00 - 41.00 %    Monocytes 6.20 0.00 - 13.40 %    Eosinophils 0.40 0.00 - 6.90 %    Basophils 0.30 0.00 - 1.80 %    Immature Granulocytes 0.30 0.00 - 0.90 %    Nucleated RBC 0.00 /100 WBC    Neutrophils (Absolute) 9.25 (H) 1.82 - 7.42 K/uL    Lymphs (Absolute) 1.44 1.00 - 4.80 K/uL    Monos (Absolute) 0.72 0.00 - 0.85 K/uL    Eos (Absolute) 0.05 0.00 - 0.51 K/uL    Baso (Absolute) 0.04 0.00 - 0.12 K/uL    Immature Granulocytes (abs) 0.04 0.00 - 0.11 K/uL    NRBC (Absolute) 0.00 K/uL   Complete Metabolic Panel (CMP)   Result Value Ref Range    Sodium 139 135  - 145 mmol/L    Potassium 4.0 3.6 - 5.5 mmol/L    Chloride 101 96 - 112 mmol/L    Co2 34 (H) 20 - 33 mmol/L    Anion Gap 4.0 0.0 - 11.9    Glucose 115 (H) 65 - 99 mg/dL    Bun 21 8 - 22 mg/dL    Creatinine 0.93 0.50 - 1.40 mg/dL    Calcium 8.8 8.5 - 10.5 mg/dL    AST(SGOT) 24 12 - 45 U/L    ALT(SGPT) 16 2 - 50 U/L    Alkaline Phosphatase 87 30 - 99 U/L    Total Bilirubin 0.8 0.1 - 1.5 mg/dL    Albumin 3.5 3.2 - 4.9 g/dL    Total Protein 6.3 6.0 - 8.2 g/dL    Globulin 2.8 1.9 - 3.5 g/dL    A-G Ratio 1.3 g/dL   Btype Natriuretic Peptide   Result Value Ref Range    B Natriuretic Peptide 684 (H) 0 - 100 pg/mL   Troponin STAT   Result Value Ref Range    Troponin I 0.05 (H) 0.00 - 0.04 ng/mL   Lipase   Result Value Ref Range    Lipase 145 (H) 11 - 82 U/L   APTT   Result Value Ref Range    APTT 29.8 24.7 - 36.0 sec   PT/INR   Result Value Ref Range    PT 14.1 12.0 - 14.6 sec    INR 1.08 0.87 - 1.13   ESTIMATED GFR   Result Value Ref Range    GFR If African American >60 >60 mL/min/1.73 m 2    GFR If Non African American >60 >60 mL/min/1.73 m 2   EKG   Result Value Ref Range    Report       [facility].    Test Date:  2019  Pt Name:    DINA CROUCH              Department: ER  MRN:        [id number]                      Room:       RD 10  Gender:     Male                         Technician: [id number]  :        1960                   Requested By:ER TRIAGE PROTOCOL  Order #:    [id number]                    Reading MD: EUNICE BEDOYA MD    Measurements  Intervals                                Axis  Rate:       99                           P:          65  MA:         144                          QRS:        108  QRSD:       78                           T:          -67  QT:         348  QTc:        447    Interpretive Statements  Twelve-lead EKG shows a normal sinus rhythm with ventricular rate of 99, QRS  shows right axis deviation, no ST segment elevation or depression, T  waves  inverted anteriorly but this is not acute.  Electronically Signed On 4- 0:28:02 PDT by LARRY VALVERDE MD         RADIOLOGY/PROCEDURES  DX-CHEST-PORTABLE (1 VIEW)   Final Result      Bilateral basilar consolidation suspicious for pneumonitis.            COURSE & MEDICAL DECISION MAKING  Pertinent Labs & Imaging studies reviewed. (See chart for details)  This a 58-year-old male who presents the emerge department acute respiratory distress.  Based on his history of heart failure suspect this is the source.  His EKG performed did not show any ischemic changes and no dynamic changes from prior.  I still cannot exclude a cardiac etiology from an ischemic event and his troponin is slightly elevated.  However his history is more consistent with a congestive heart failure exacerbation.  The patient did respond to Nitropaste as well as Lasix.  He is resting much more comfortably with supplemental oxygenation after received the medication.  His blood pressure has come down.  The patient's lipase is slightly elevated but does not have any abdominal pain to support pancreatitis.  The patient has stabilized as mentioned above.  He will be admitted to the hospital for further workup.  He did receive aspirin on arrival.    FINAL IMPRESSION  1.  Dyspnea  2.  Suspect secondary to CHF exacerbation  3.  Chest pain rule out acute coronary syndrome  4.  Critical care time 30 minutes         Electronically signed by: Larry Valverde, 4/14/2019 12:28 AM

## 2019-04-14 NOTE — ASSESSMENT & PLAN NOTE
Severe noted on last echocardiogram   Had significant work up during admission in December: Cardiac MRI, CT PE, right heart cath. He had been evaluated by pulmonary and cardiology. All tests were negative and his right heart cath was negative for vasoreactivity to adenosine. He was supposed to follow up with cards as an outpatient for initiation of advance pulmonary HTN management but has failed to do so  Apparently also has ILD

## 2019-04-14 NOTE — ED NOTES
Pt on call light requesting additional food and juice. Provided Pt with 1/2 sandwich and one container of juice.

## 2019-04-14 NOTE — PROGRESS NOTES
I saw and examined patient today. Patient admitted early am, see H and P for full details. Admitted for acute hypoxia d/t acute on chronic right heart failure. Oxygen requirements decreasing now down to 4L from 15 liters in ER. -1.4L output since admit. CXR reviewed by me showing R lower lobe opacity. Has been having a minimally productive cough. I will check a procalcitonin. If elevated I will start him on antibiotics.

## 2019-04-14 NOTE — ED NOTES
Pt repeatedly pressing call button to request fluids, and additional information. Pt provided water where, Pt adamantly refusing water and would like juice. Provided Pt juice. Admitting MD at bedside.

## 2019-04-14 NOTE — CARE PLAN
Problem: Communication  Goal: The ability to communicate needs accurately and effectively will improve  Pt communicating needs appropriately. Oriented to the call light and to call for assistance.       Problem: Safety  Goal: Will remain free from falls  Safety precautions reviewed with pt, pt verbalized understanding and denies questions.  Pt demonstrated ability to use call light for assistance.

## 2019-04-14 NOTE — ASSESSMENT & PLAN NOTE
Cont with IV lasix 40 BID   troponins negative  Echo with EF of 60% and severe Pulmonary HTN  Monitor I/O's  Will need outpatient follow up with cards

## 2019-04-14 NOTE — PROGRESS NOTES
Monitor Summary  SR/ST   0.16/0.08/0.36   Test Reason : 

Blood Pressure : ***/*** mmHG

Vent. Rate : 049 BPM     Atrial Rate : 049 BPM

   P-R Int : 144 ms          QRS Dur : 108 ms

    QT Int : 504 ms       P-R-T Axes : 035 024 018 degrees

   QTc Int : 455 ms

 

SINUS BRADYCARDIA

NONSPECIFIC ST AND T WAVE ABNORMALITY

ABNORMAL ECG

WHEN COMPARED WITH ECG OF 23-MAY-2018 03:55,

PREMATURE VENTRICULAR COMPLEXES ARE NO LONGER PRESENT

VENT. RATE HAS DECREASED BY  31 BPM

Confirmed by JANES HAYES MD ([id number]) on 5/24/2018 4:30:21 PM

 

Referred By: BURAK FLETCHER DR           Confirmed By:JANES HAYES MD

## 2019-04-14 NOTE — ED NOTES
Pt given additional warm blankets. States he feels a little better, still has some shortness of breath.

## 2019-04-14 NOTE — PROGRESS NOTES
Pt arrived on floor from ED. Pt placed on telemetry monitor, Monitor room notified. POC discussed at bedside. Call light and belongings with in reach. PT admitted and assessed.

## 2019-04-14 NOTE — ED NOTES
Pt ambulated to triage with exhaustion, placed in WC, CP and SOB noted 43% on room air, placed on 15 liters NRB Charge Rn called for room assignment, pt taken to room RN report given with hx of CHF with pulmonary edema.

## 2019-04-14 NOTE — ASSESSMENT & PLAN NOTE
Reactive cont to montior  procalcitonin negative  Will repeat CXR today  Symptoms are improving without antibiotics

## 2019-04-14 NOTE — H&P
Hospital Medicine History & Physical Note    Date of Service  4/14/2019    Primary Care Physician  No primary care provider on file.    Consultants  none    Code Status  full    Chief Complaint  Shortness of breath     History of Presenting Illness  58 y.o. male who presented 4/13/2019 with past medical history of pulmonary hypertension and right-sided heart failure with diastolic dysfunction and preserved ejection fraction presents with shortness of breath.  This patient has had shortness of breath and chest pressure has been going on for quite some time now.  He has had a significant lower extremity edema has worsened over the last week.  He is had intermittent compliance with his home Lasix.  He has no known alleviating or exacerbating factors to her symptoms.  He will be admitted to the hospital for acute on chronic heart failure.    Review of Systems  Review of Systems   Constitutional: Negative for chills and fever.   HENT: Negative for congestion, hearing loss and tinnitus.    Eyes: Negative for blurred vision, double vision and discharge.   Respiratory: Positive for shortness of breath. Negative for cough and hemoptysis.    Cardiovascular: Positive for chest pain, orthopnea and leg swelling. Negative for palpitations.   Gastrointestinal: Negative for abdominal pain, heartburn, nausea and vomiting.   Genitourinary: Negative for dysuria and flank pain.   Musculoskeletal: Negative for joint pain and myalgias.   Skin: Negative for rash.   Neurological: Positive for weakness. Negative for dizziness, sensory change, speech change and focal weakness.   Endo/Heme/Allergies: Negative for environmental allergies. Does not bruise/bleed easily.   Psychiatric/Behavioral: Negative for depression, hallucinations and substance abuse.       Past Medical History  CHF    Surgical History  Reviewed and not pertinent     Family History  Reviewed and not pertinent     Social History   reports that he has been smoking.  He has  been smoking about 0.10 packs per day. He has never used smokeless tobacco.    Allergies  No Known Allergies    Medications  Prior to Admission Medications   Prescriptions Last Dose Informant Patient Reported? Taking?   Omega-3 Fatty Acids (FISH OIL) 1000 MG Cap capsule   No No   Sig: Take 1 Cap by mouth 3 times a day, with meals.   albuterol 108 (90 Base) MCG/ACT Aero Soln inhalation aerosol   No No   Sig: Inhale 2 Puffs by mouth every 6 hours as needed for Shortness of Breath.   furosemide (LASIX) 20 MG Tab   No No   Sig: Take 1 Tab by mouth every day.      Facility-Administered Medications: None       Physical Exam  Temp:  [36.2 °C (97.2 °F)] 36.2 °C (97.2 °F)  Pulse:  [] 95  Resp:  [16-34] 33  BP: (128-147)/() 147/107  SpO2:  [45 %-100 %] 93 %    Physical Exam   Constitutional: He is oriented to person, place, and time. He appears well-developed and well-nourished. He appears distressed.   HENT:   Head: Normocephalic and atraumatic.   Eyes: Pupils are equal, round, and reactive to light. Conjunctivae and EOM are normal.   Neck: Normal range of motion. Neck supple. No JVD present.   Cardiovascular: Normal rate, regular rhythm, normal heart sounds and intact distal pulses.    No murmur heard.  Pulmonary/Chest: Breath sounds normal. He is in respiratory distress. He exhibits no tenderness.   Abdominal: Soft. Bowel sounds are normal. He exhibits no distension. There is no tenderness.   Musculoskeletal: Normal range of motion. He exhibits edema.   Peripheral edema pitting   Neurological: He is alert and oriented to person, place, and time. No cranial nerve deficit. He exhibits normal muscle tone.   Skin: Skin is warm and dry. No erythema.   Psychiatric: He has a normal mood and affect. His behavior is normal. Judgment and thought content normal.   Nursing note and vitals reviewed.      Laboratory:  Recent Labs      04/13/19   2240   WBC  11.5*   RBC  4.92   HEMOGLOBIN  14.8   HEMATOCRIT  42.9   MCV  87.2    MCH  30.1   MCHC  34.5   RDW  44.6   PLATELETCT  284   MPV  9.4     Recent Labs      04/13/19   2240   SODIUM  139   POTASSIUM  4.0   CHLORIDE  101   CO2  34*   GLUCOSE  115*   BUN  21   CREATININE  0.93   CALCIUM  8.8     Recent Labs      04/13/19   2240   ALTSGPT  16   ASTSGOT  24   ALKPHOSPHAT  87   TBILIRUBIN  0.8   LIPASE  145*   GLUCOSE  115*     Recent Labs      04/13/19   2240   APTT  29.8   INR  1.08     Recent Labs      04/13/19   2240   BNPBTYPENAT  684*         Recent Labs      04/13/19 2240   TROPONINI  0.05*       Urinalysis:    No results found     Imaging:  DX-CHEST-PORTABLE (1 VIEW)   Final Result      Bilateral basilar consolidation suspicious for pneumonitis.            Assessment/Plan:  I anticipate this patient is appropriate for observation status at this time.    * Acute on chronic right-sided heart failure (HCC)- (present on admission)   Assessment & Plan    Definitely volume overloaded on exam   Cont with IV lasix 40 BID   Nitropaste   Cardiac monitoring   Serial troponin   Repeat echocardiogram    Last echo   No prior study is available for comparison.   Left ventricle is small in size. Severe concentric left ventricular   hypertrophy. Hyperdynamic left ventricular systolic function. Left   ventricular ejection fraction is visually estimated to be greater than   75%. Abnormal septal motion consistent with right ventricular (RV)   volume overload and/or elevated RV end-diastolic pressure.   Indeterminate diastolic function. Near mid cavity obstruction.  Severely dilated right ventricle.  Right ventricular systolic pressure is estimated to be 80 mmHg.     Pulmonary hypertension (HCC)- (present on admission)   Assessment & Plan    Severe noted on last echocardiogram   Needs pulmonary follow up and outpatient sleep study/PFT     Acute respiratory failure with hypoxia and hypercapnia (HCC)- (present on admission)   Assessment & Plan    Due to volume overload   Cont with IV diuresis   Wean 02  as tolerated  Keep sats 88-92% suspect underlying COPD     Elevated troponin- (present on admission)   Assessment & Plan    Suspect demand ischemia from volume overload   Cont to trend trops   Cardiac monitoring   Repeat echo ordered     Hypercapnia- (present on admission)   Assessment & Plan    Noted on labs this is chronic for this patient       Leukocytosis   Assessment & Plan    Reactive cont to montior     Tobacco abuse   Assessment & Plan    Greater than 10 minutes spent with patient smoking cessation counseling. Discussed cardiovascular risk factors of smoking.      COPD (chronic obstructive pulmonary disease) (HCC)   Assessment & Plan    Highly suspect underlying COPD   Needs outpatient pft         VTE prophylaxis: heparin

## 2019-04-14 NOTE — ASSESSMENT & PLAN NOTE
Due to volume overload   Cont with IV diuresis   Wean 02 as tolerated  Keep sats 88-92% suspect underlying COPD

## 2019-04-15 ENCOUNTER — APPOINTMENT (OUTPATIENT)
Dept: RADIOLOGY | Facility: MEDICAL CENTER | Age: 59
DRG: 291 | End: 2019-04-15
Attending: HOSPITALIST
Payer: MEDICAID

## 2019-04-15 LAB
ALBUMIN SERPL BCP-MCNC: 3 G/DL (ref 3.2–4.9)
ALBUMIN/GLOB SERPL: 1.2 G/DL
ALP SERPL-CCNC: 70 U/L (ref 30–99)
ALT SERPL-CCNC: 11 U/L (ref 2–50)
ANION GAP SERPL CALC-SCNC: 5 MMOL/L (ref 0–11.9)
AST SERPL-CCNC: 18 U/L (ref 12–45)
BASOPHILS # BLD AUTO: 0.6 % (ref 0–1.8)
BASOPHILS # BLD: 0.07 K/UL (ref 0–0.12)
BILIRUB SERPL-MCNC: 0.9 MG/DL (ref 0.1–1.5)
BNP SERPL-MCNC: 554 PG/ML (ref 0–100)
BUN SERPL-MCNC: 20 MG/DL (ref 8–22)
CALCIUM SERPL-MCNC: 8.1 MG/DL (ref 8.5–10.5)
CHLORIDE SERPL-SCNC: 99 MMOL/L (ref 96–112)
CO2 SERPL-SCNC: 37 MMOL/L (ref 20–33)
CREAT SERPL-MCNC: 1 MG/DL (ref 0.5–1.4)
EOSINOPHIL # BLD AUTO: 0.1 K/UL (ref 0–0.51)
EOSINOPHIL NFR BLD: 0.9 % (ref 0–6.9)
ERYTHROCYTE [DISTWIDTH] IN BLOOD BY AUTOMATED COUNT: 43.6 FL (ref 35.9–50)
GLOBULIN SER CALC-MCNC: 2.5 G/DL (ref 1.9–3.5)
GLUCOSE SERPL-MCNC: 106 MG/DL (ref 65–99)
HCT VFR BLD AUTO: 42.8 % (ref 42–52)
HGB BLD-MCNC: 14.5 G/DL (ref 14–18)
IMM GRANULOCYTES # BLD AUTO: 0.04 K/UL (ref 0–0.11)
IMM GRANULOCYTES NFR BLD AUTO: 0.3 % (ref 0–0.9)
LV EJECT FRACT  99904: 60
LV EJECT FRACT MOD 2C 99903: 46.05
LV EJECT FRACT MOD 4C 99902: 72.6
LV EJECT FRACT MOD BP 99901: 63.5
LYMPHOCYTES # BLD AUTO: 1.34 K/UL (ref 1–4.8)
LYMPHOCYTES NFR BLD: 11.7 % (ref 22–41)
MCH RBC QN AUTO: 30 PG (ref 27–33)
MCHC RBC AUTO-ENTMCNC: 33.9 G/DL (ref 33.7–35.3)
MCV RBC AUTO: 88.6 FL (ref 81.4–97.8)
MONOCYTES # BLD AUTO: 0.94 K/UL (ref 0–0.85)
MONOCYTES NFR BLD AUTO: 8.2 % (ref 0–13.4)
NEUTROPHILS # BLD AUTO: 9.01 K/UL (ref 1.82–7.42)
NEUTROPHILS NFR BLD: 78.3 % (ref 44–72)
NRBC # BLD AUTO: 0 K/UL
NRBC BLD-RTO: 0 /100 WBC
PLATELET # BLD AUTO: 272 K/UL (ref 164–446)
PMV BLD AUTO: 9.3 FL (ref 9–12.9)
POTASSIUM SERPL-SCNC: 3.9 MMOL/L (ref 3.6–5.5)
PROT SERPL-MCNC: 5.5 G/DL (ref 6–8.2)
RBC # BLD AUTO: 4.83 M/UL (ref 4.7–6.1)
SODIUM SERPL-SCNC: 141 MMOL/L (ref 135–145)
WBC # BLD AUTO: 11.5 K/UL (ref 4.8–10.8)

## 2019-04-15 PROCEDURE — 700102 HCHG RX REV CODE 250 W/ 637 OVERRIDE(OP): Performed by: HOSPITALIST

## 2019-04-15 PROCEDURE — 71046 X-RAY EXAM CHEST 2 VIEWS: CPT

## 2019-04-15 PROCEDURE — 85025 COMPLETE CBC W/AUTO DIFF WBC: CPT

## 2019-04-15 PROCEDURE — A9270 NON-COVERED ITEM OR SERVICE: HCPCS | Performed by: HOSPITALIST

## 2019-04-15 PROCEDURE — 99407 BEHAV CHNG SMOKING > 10 MIN: CPT

## 2019-04-15 PROCEDURE — 36415 COLL VENOUS BLD VENIPUNCTURE: CPT

## 2019-04-15 PROCEDURE — 99232 SBSQ HOSP IP/OBS MODERATE 35: CPT | Performed by: HOSPITALIST

## 2019-04-15 PROCEDURE — 770020 HCHG ROOM/CARE - TELE (206)

## 2019-04-15 PROCEDURE — 80053 COMPREHEN METABOLIC PANEL: CPT

## 2019-04-15 PROCEDURE — 94760 N-INVAS EAR/PLS OXIMETRY 1: CPT

## 2019-04-15 PROCEDURE — 700111 HCHG RX REV CODE 636 W/ 250 OVERRIDE (IP): Performed by: HOSPITALIST

## 2019-04-15 PROCEDURE — 83880 ASSAY OF NATRIURETIC PEPTIDE: CPT

## 2019-04-15 RX ADMIN — OMEGA-3 FATTY ACIDS CAP 1000 MG 1000 MG: 1000 CAP at 13:03

## 2019-04-15 RX ADMIN — HEPARIN SODIUM 5000 UNITS: 5000 INJECTION, SOLUTION INTRAVENOUS; SUBCUTANEOUS at 22:33

## 2019-04-15 RX ADMIN — OMEGA-3 FATTY ACIDS CAP 1000 MG 1000 MG: 1000 CAP at 17:54

## 2019-04-15 RX ADMIN — FUROSEMIDE 40 MG: 10 INJECTION, SOLUTION INTRAMUSCULAR; INTRAVENOUS at 05:14

## 2019-04-15 RX ADMIN — HEPARIN SODIUM 5000 UNITS: 5000 INJECTION, SOLUTION INTRAVENOUS; SUBCUTANEOUS at 15:55

## 2019-04-15 RX ADMIN — NITROGLYCERIN 0.5 INCH: 20 OINTMENT TOPICAL at 05:14

## 2019-04-15 RX ADMIN — HEPARIN SODIUM 5000 UNITS: 5000 INJECTION, SOLUTION INTRAVENOUS; SUBCUTANEOUS at 05:14

## 2019-04-15 RX ADMIN — OMEGA-3 FATTY ACIDS CAP 1000 MG 1000 MG: 1000 CAP at 05:13

## 2019-04-15 RX ADMIN — FUROSEMIDE 40 MG: 10 INJECTION, SOLUTION INTRAMUSCULAR; INTRAVENOUS at 15:55

## 2019-04-15 ASSESSMENT — ENCOUNTER SYMPTOMS
CHILLS: 0
ABDOMINAL PAIN: 0
COUGH: 1
NAUSEA: 0
SHORTNESS OF BREATH: 1
SPUTUM PRODUCTION: 1
PALPITATIONS: 0
HEADACHES: 0
FEVER: 0
DIZZINESS: 0
VOMITING: 0
WHEEZING: 0
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
## 2019-04-15 NOTE — PROGRESS NOTES
Assumed care at 1900, bedside report received from day shift RN. Pt is SR on the telemetry monitor. Patient is AO x 4 and is resting in bed with even respirations. Initial assessment completed and orders reviewed. POC addressed with patient. Call light within reach and hourly rounding in place. No further questions at this time.     Patient requesting ABX at this time. Dr. Archuleta paged. No new orders received.

## 2019-04-15 NOTE — PROGRESS NOTES
"Hospital Medicine Daily Progress Note    Date of Service  4/15/2019    Chief Complaint   58 y.o. male admitted 4/13/2019 with acute hypoxic respiratory failure d/t acute exacerbation of right heart failure    Hospital Course    57 y/o M with PMHx pulmonary HTN and right sided heart failure presenting with shortness of breath.  He was found to have acute hypoxic respiratory failure secondary to exacerbation of right heart failure.  He was found to be saturating at 43% on room air on presentation to ER and required 15 L nonrebreather.  He has been diuresed and placed on a fluid restriction and his oxygen requirements have subsequently been coming down.       Interval Problem Update  His productive cough is improving  He is unclear about what I mean when I asked him if his shortness of breath is improving.  He states \"wellI am still wearing oxygen\"  Patient is very talkative and despite explaining plan of care to him multiple times he still does not seem to understand.  Myself and bedside RN have explained to patient on multiple occasions about why he still requires oxygen and why he still needs to be in the hospital.    Consultants/Specialty  none    Code Status  Full code    Disposition  Home once medically cleared    Review of Systems  Review of Systems   Constitutional: Negative for chills and fever.   Respiratory: Positive for cough, sputum production and shortness of breath. Negative for wheezing.    Cardiovascular: Negative for chest pain, palpitations and leg swelling.   Gastrointestinal: Negative for abdominal pain, nausea and vomiting.   Genitourinary: Negative for dysuria and urgency.   Neurological: Negative for dizziness and headaches.   All other systems reviewed and are negative.       Physical Exam  Temp:  [36.4 °C (97.5 °F)-37 °C (98.6 °F)] 36.6 °C (97.9 °F)  Pulse:  [] 108  Resp:  [17-22] 22  BP: (102-120)/(68-83) 106/75  SpO2:  [88 %-94 %] 88 %    Physical Exam   Constitutional: He is oriented " to person, place, and time. He appears well-developed and well-nourished.   HENT:   Head: Normocephalic and atraumatic.   Eyes: Conjunctivae are normal. Right eye exhibits no discharge. Left eye exhibits no discharge.   Cardiovascular: Normal rate and regular rhythm.    No murmur heard.  Pulmonary/Chest: Effort normal and breath sounds normal. No respiratory distress.   Abdominal: Soft. Bowel sounds are normal. He exhibits no distension. There is no tenderness.   Musculoskeletal: He exhibits edema (Trace ).   Neurological: He is alert and oriented to person, place, and time.   Skin: Skin is warm and dry.   Psychiatric:   Odd affect   Nursing note and vitals reviewed.      Fluids    Intake/Output Summary (Last 24 hours) at 04/15/19 1335  Last data filed at 04/15/19 1312   Gross per 24 hour   Intake              360 ml   Output                0 ml   Net              360 ml       Laboratory  Recent Labs      04/13/19   2240  04/15/19   0029   WBC  11.5*  11.5*   RBC  4.92  4.83   HEMOGLOBIN  14.8  14.5   HEMATOCRIT  42.9  42.8   MCV  87.2  88.6   MCH  30.1  30.0   MCHC  34.5  33.9   RDW  44.6  43.6   PLATELETCT  284  272   MPV  9.4  9.3     Recent Labs      04/13/19   2240  04/15/19   0029   SODIUM  139  141   POTASSIUM  4.0  3.9   CHLORIDE  101  99   CO2  34*  37*   GLUCOSE  115*  106*   BUN  21  20   CREATININE  0.93  1.00   CALCIUM  8.8  8.1*     Recent Labs      04/13/19 2240   APTT  29.8   INR  1.08     Recent Labs      04/13/19 2240  04/15/19   0029   BNPBTYPENAT  684*  554*           Imaging  EC-ECHOCARDIOGRAM COMPLETE W/O CONT   Final Result      DX-CHEST-PORTABLE (1 VIEW)   Final Result      Bilateral basilar consolidation suspicious for pneumonitis.      DX-CHEST-2 VIEWS    (Results Pending)        Assessment/Plan  * Acute on chronic right-sided heart failure (HCC)- (present on admission)   Assessment & Plan    Cont with IV lasix 40 BID   troponins negative  Echo with EF of 60% and severe Pulmonary  HTN  Monitor I/O's  Will need outpatient follow up with cards         Pulmonary hypertension (HCC)- (present on admission)   Assessment & Plan    Severe noted on last echocardiogram   Had significant work up during admission in December: Cardiac MRI, CT PE, right heart cath. He had been evaluated by pulmonary and cardiology. All tests were negative and his right heart cath was negative for vasoreactivity to adenosine. He was supposed to follow up with [facility] as an outpatient for initiation of advance pulmonary HTN management but has failed to do so  Apparently also has ILD     Acute respiratory failure with hypoxia and hypercapnia ([facility])- (present on admission)   Assessment & Plan    Due to volume overload   Cont with IV diuresis   Wean 02 as tolerated  Keep sats 88-92% suspect underlying COPD     Elevated troponin- (present on admission)   Assessment & Plan    Suspect demand ischemia from severe hypoxia on admission  Repeat troponins negative     Hypercapnia- (present on admission)   Assessment & Plan    Noted on labs this is chronic for this patient       Leukocytosis   Assessment & Plan    Reactive cont to montior  procalcitonin negative  Will repeat CXR today  Symptoms are improving without antibiotics     Tobacco abuse   Assessment & Plan    Patient has been counseled     COPD (chronic obstructive pulmonary disease) ([facility])   Assessment & Plan    Highly suspect underlying COPD   Needs outpatient pft          VTE prophylaxis: heparin

## 2019-04-15 NOTE — RESPIRATORY CARE
COPD Education by COPD Clinical Educator  4/15/2019 at 9:48 AM by Hollie Orr    Patient interviewed by COPD education team.  Patient refused full COPD program at this time, but agreed to short intervention.  A comprehensive packet including information about COPD, treatments, and smoking cessation given.

## 2019-04-16 VITALS
TEMPERATURE: 97.8 F | SYSTOLIC BLOOD PRESSURE: 112 MMHG | BODY MASS INDEX: 20.22 KG/M2 | DIASTOLIC BLOOD PRESSURE: 75 MMHG | HEART RATE: 99 BPM | RESPIRATION RATE: 16 BRPM | HEIGHT: 76 IN | WEIGHT: 166.01 LBS | OXYGEN SATURATION: 94 %

## 2019-04-16 PROCEDURE — 700102 HCHG RX REV CODE 250 W/ 637 OVERRIDE(OP): Performed by: HOSPITALIST

## 2019-04-16 PROCEDURE — 99239 HOSP IP/OBS DSCHRG MGMT >30: CPT | Performed by: HOSPITALIST

## 2019-04-16 PROCEDURE — 700111 HCHG RX REV CODE 636 W/ 250 OVERRIDE (IP): Performed by: HOSPITALIST

## 2019-04-16 PROCEDURE — A9270 NON-COVERED ITEM OR SERVICE: HCPCS | Performed by: HOSPITALIST

## 2019-04-16 RX ORDER — METHYLPREDNISOLONE SODIUM SUCCINATE 40 MG/ML
40 INJECTION, POWDER, LYOPHILIZED, FOR SOLUTION INTRAMUSCULAR; INTRAVENOUS ONCE
Status: DISCONTINUED | OUTPATIENT
Start: 2019-04-16 | End: 2019-04-16 | Stop reason: HOSPADM

## 2019-04-16 RX ORDER — IPRATROPIUM BROMIDE AND ALBUTEROL SULFATE 2.5; .5 MG/3ML; MG/3ML
3 SOLUTION RESPIRATORY (INHALATION) ONCE
Status: DISCONTINUED | OUTPATIENT
Start: 2019-04-16 | End: 2019-04-16 | Stop reason: HOSPADM

## 2019-04-16 RX ORDER — PREDNISONE 20 MG/1
40 TABLET ORAL DAILY
Qty: 10 TAB | Refills: 0 | Status: SHIPPED | OUTPATIENT
Start: 2019-04-16 | End: 2019-04-21

## 2019-04-16 RX ORDER — POTASSIUM CHLORIDE 750 MG/1
10 TABLET, EXTENDED RELEASE ORAL 2 TIMES DAILY
Qty: 90 TAB | Refills: 3 | Status: SHIPPED | OUTPATIENT
Start: 2019-04-16

## 2019-04-16 RX ORDER — FUROSEMIDE 20 MG/1
40 TABLET ORAL DAILY
Qty: 60 TAB | Refills: 1 | Status: SHIPPED | OUTPATIENT
Start: 2019-04-16

## 2019-04-16 RX ORDER — GUAIFENESIN 600 MG/1
600 TABLET, EXTENDED RELEASE ORAL EVERY 12 HOURS
Qty: 14 TAB | Refills: 0 | Status: SHIPPED | OUTPATIENT
Start: 2019-04-16 | End: 2019-04-23

## 2019-04-16 RX ADMIN — OMEGA-3 FATTY ACIDS CAP 1000 MG 1000 MG: 1000 CAP at 10:52

## 2019-04-16 RX ADMIN — HEPARIN SODIUM 5000 UNITS: 5000 INJECTION, SOLUTION INTRAVENOUS; SUBCUTANEOUS at 05:46

## 2019-04-16 RX ADMIN — OMEGA-3 FATTY ACIDS CAP 1000 MG 1000 MG: 1000 CAP at 05:46

## 2019-04-16 RX ADMIN — FUROSEMIDE 40 MG: 10 INJECTION, SOLUTION INTRAMUSCULAR; INTRAVENOUS at 05:46

## 2019-04-16 NOTE — PROGRESS NOTES
Pt dc'd at 1510. IV removed. Pt left unit via wheel with transport to personal car. Personal belongings with pt when leaving unit. Pt given discharge instructions prior to leaving unit including prescription and when to visit with physician; verbalizes understanding. Copy of discharge instructions with pt and in the chart.

## 2019-04-16 NOTE — DISCHARGE SUMMARY
Discharge Summary    CHIEF COMPLAINT ON ADMISSION  No chief complaint on file.      Reason for Admission  SOB     Admission Date  4/13/2019    CODE STATUS  Full Code    HPI & HOSPITAL COURSE  This is a 58 y.o. male here with past medical history of chronic respiratory failure on 3 L of O2 secondary to COPD and pulmonary hypertension.  He presents to the emergency room with shortness of breath, chest tightness, cough.  This was associated with increased lower extremity edema.  He was found to be noncompliant with his home Lasix.  He presented to the emergency room with blood pressure 147/107, pulse 95, respiratory rate of 33, saturating 93%.  Chest x-ray found bilateral pneumonitis.  His BNP was found to be elevated above 500.  EKG found sinus tachycardia.  He was started on IV Lasix.  Pro-calcitonin was found to be negative and he was never started on antibiotics.  Throughout his hospital stay his symptoms had improved including his shortness of breath and lower extremity edema.  He has 3 L of home O2 and he was asked to increase it to 4 L.  He has a follow-up appointment with cardiology.       Therefore, he is discharged in good and stable condition to home with close outpatient follow-up.    The patient met 2-midnight criteria for an inpatient stay at the time of discharge.    Discharge Date  4/16/2019    FOLLOW UP ITEMS POST DISCHARGE  Follow up with cardiology  Continue lasix    DISCHARGE DIAGNOSES  Principal Problem:    Acute on chronic right-sided heart failure (HCC) POA: Yes  Active Problems:    Acute respiratory failure with hypoxia and hypercapnia (HCC) POA: Yes    Pulmonary hypertension (HCC) POA: Yes    Hypercapnia POA: Yes    Elevated troponin POA: Yes    COPD (chronic obstructive pulmonary disease) (HCC) POA: Unknown    Tobacco abuse POA: Unknown    Leukocytosis POA: Unknown  Resolved Problems:    * No resolved hospital problems. *      FOLLOW UP  Future Appointments  Date Time Provider Department  Center   5/14/2019 12:40 PM Anderson Murillo M.D. [facility] None   5/22/2019 7:45 AM Sami Montoya M.D. [facility] None     No follow-up provider specified.    MEDICATIONS ON DISCHARGE     Medication List      START taking these medications      Instructions   guaiFENesin  MG Tb12  Commonly known as:  MUCINEX   Take 1 Tab by mouth every 12 hours for 7 days.  Dose:  600 mg     potassium chloride SA 10 MEQ Tbcr  Commonly known as:  K-DUR   Take 1 Tab by mouth 2 times a day.  Dose:  10 mEq     predniSONE 20 MG Tabs  Commonly known as:  DELTASONE   Take 2 Tabs by mouth every day for 5 days.  Dose:  40 mg        CHANGE how you take these medications      Instructions   furosemide 20 MG Tabs  What changed:  how much to take  Commonly known as:  LASIX   Take 2 Tabs by mouth every day.  Dose:  40 mg        CONTINUE taking these medications      Instructions   albuterol 108 (90 Base) MCG/ACT Aers inhalation aerosol   Inhale 2 Puffs by mouth every 6 hours as needed for Shortness of Breath.  Dose:  2 Puff            Allergies  Allergies   Allergen Reactions   • Codeine      Hives       DIET  Orders Placed This Encounter   Procedures   • Diet Order Cardiac     Standing Status:   Standing     Number of Occurrences:   1     Order Specific Question:   Diet:     Answer:   Cardiac [6]       ACTIVITY  As tolerated.  Weight bearing as tolerated    CONSULTATIONS  None    PROCEDURES  None    LABORATORY  Lab Results   Component Value Date    SODIUM 141 04/15/2019    POTASSIUM 3.9 04/15/2019    CHLORIDE 99 04/15/2019    CO2 37 (H) 04/15/2019    GLUCOSE 106 (H) 04/15/2019    BUN 20 04/15/2019    CREATININE 1.00 04/15/2019        Lab Results   Component Value Date    WBC 11.5 (H) 04/15/2019    HEMOGLOBIN 14.5 04/15/2019    HEMATOCRIT 42.8 04/15/2019    PLATELETCT 272 04/15/2019        Total time of the discharge process exceeds 40 minutes.

## 2019-04-16 NOTE — CARE PLAN
Problem: Knowledge Deficit  Goal: Knowledge of disease process/condition, treatment plan, diagnostic tests, and medications will improve  Outcome: PROGRESSING SLOWER THAN EXPECTED      Problem: Respiratory:  Goal: Respiratory status will improve  Outcome: PROGRESSING SLOWER THAN EXPECTED

## 2019-04-16 NOTE — DISCHARGE INSTRUCTIONS
Discharge Instructions    Discharged to home by car with relative. Discharged via wheelchair, hospital escort: Yes.  Special equipment needed: Not Applicable    Be sure to schedule a follow-up appointment with your primary care doctor or any specialists as instructed.     Discharge Plan:   Smoking Cessation Offered: Patient Refused  Pneumococcal Vaccine Administered/Refused: Not given - Patient refused pneumococcal vaccine  Influenza Vaccine Indication: Patient Refuses    I understand that a diet low in cholesterol, fat, and sodium is recommended for good health. Unless I have been given specific instructions below for another diet, I accept this instruction as my diet prescription.   Other diet: Cardiac    Special Instructions:   HF Patient Discharge Instructions  · Monitor your weight daily, and maintain a weight chart, to track your weight changes.   · Activity as tolerated, unless your Doctor has ordered otherwise. Other activity order: As tolerated.  · Follow a low fat, low cholesterol, low salt diet unless instructed otherwise by your Doctor. Read the labels on the back of food products and track your intake of fat, cholesterol and salt.   · Fluid Restriction No. If a Fluid Restriction has been ordered by your Doctor, measure fluids with a measuring cup to ensure that you are not exceeding the restriction.   · No smoking.  · Oxygen No. If your Doctor has ordered that you wear Oxygen at home, it is important to wear it as ordered.  · Did you receive an explanation from staff on the importance of taking each of your medications and why it is necessary to keep taking them unless your doctor says to stop? Yes  · Were all of your questions answered about how to manage your heart failure and what to do if you have increased signs and symptoms after you go home? Yes  · Do you feel like your heart failure care team involved you in the care treatment plan and allowed you to make decisions regarding your care while in  the hospital and addressed any discharge needs you might have? Yes    See the educational handout provided at discharge for more information on monitoring your daily weight, activity and diet. This also explains more about Heart Failure, symptoms of a flare-up and some of the tests that you have undergone.     Warning Signs of a Flare-Up include:  · Swelling in the ankles or lower legs.  · Shortness of breath, while at rest, or while doing normal activities.   · Shortness of breath at night when in bed, or coughing in bed.   · Requiring more pillows to sleep at night, or needing to sit up at night to sleep.  · Feeling weak, dizzy or fatigued.     When to call your Doctor:  · Call [facility] seven days a week from 8:00 a.m. to 8:00 p.m. for medical questions [phone].  · Call your Primary Care Physician or Cardiologist if:   1. You experience any pain radiating to your jaw or neck.  2. You have any difficulty breathing.  3. You experience weight gain of 3 lbs in a day or 5 lbs in a week.   4. You feel any palpitations or irregular heartbeats.  5. You become dizzy or lose consciousness.   If you have had an angiogram or had a pacemaker or AICD placed, and experience:  1. Bleeding, drainage or swelling at the surgical / puncture site.  2. Fever greater than 100.0 F  3. Shock from internal defibrillator.  4. Cool and / or numb extremities.      · Is patient discharged on Warfarin / Coumadin?   No     Depression / Suicide Risk    As you are discharged from this [facility], it is important to learn how to keep safe from harming yourself.    Recognize the warning signs:  · Abrupt changes in personality, positive or negative- including increase in energy   · Giving away possessions  · Change in eating patterns- significant weight changes-  positive or negative  · Change in sleeping patterns- unable to sleep or sleeping all the time   · Unwillingness or inability to  communicate  · Depression  · Unusual sadness, discouragement and loneliness  · Talk of wanting to die  · Neglect of personal appearance   · Rebelliousness- reckless behavior  · Withdrawal from people/activities they love  · Confusion- inability to concentrate     If you or a loved one observes any of these behaviors or has concerns about self-harm, here's what you can do:  · Talk about it- your feelings and reasons for harming yourself  · Remove any means that you might use to hurt yourself (examples: pills, rope, extension cords, firearm)  · Get professional help from the community (Mental Health, Substance Abuse, psychological counseling)  · Do not be alone:Call your Safe Contact- someone whom you trust who will be there for you.  · Call your local CRISIS HOTLINE 429-3373 or 151-143-1001  · Call your local Children's Mobile Crisis Response Team [place] (012) 677-0284 or www.Socowave  · Call the toll free National Suicide Prevention Hotlines   · National Suicide Prevention Lifeline 244-798-YSQS (2303)  · [employer] 800-SUICIDE (457-7094)      Heart Failure  Heart failure means your heart has trouble pumping blood. This makes it hard for your body to work well. Heart failure is usually a long-term (chronic) condition. You must take good care of yourself and follow your doctor's treatment plan.  HOME CARE  · Take your heart medicine as told by your doctor.  ¨ Do not stop taking medicine unless your doctor tells you to.  ¨ Do not skip any dose of medicine.  ¨ Refill your medicines before they run out.  ¨ Take other medicines only as told by your doctor or pharmacist.  · Stay active if told by your doctor. The elderly and people with severe heart failure should talk with a doctor about physical activity.  · Eat heart-healthy foods. Choose foods that are without trans fat and are low in saturated fat, cholesterol, and salt (sodium). This includes fresh or frozen fruits and vegetables, fish,  lean meats, fat-free or low-fat dairy foods, whole grains, and high-fiber foods. Lentils and dried peas and beans (legumes) are also good choices.  · Limit salt if told by your doctor.  · Cook in a healthy way. Roast, grill, broil, bake, poach, steam, or stir-elliott foods.  · Limit fluids as told by your doctor.  · Weigh yourself every morning. Do this after you pee (urinate) and before you eat breakfast. Write down your weight to give to your doctor.  · Take your blood pressure and write it down if your doctor tells you to.  · Ask your doctor how to check your pulse. Check your pulse as told.  · Lose weight if told by your doctor.  · Stop smoking or chewing tobacco. Do not use gum or patches that help you quit without your doctor's approval.  · Schedule and go to doctor visits as told.  · Nonpregnant women should have no more than 1 drink a day. Men should have no more than 2 drinks a day. Talk to your doctor about drinking alcohol.  · Stop illegal drug use.  · Stay current with shots (immunizations).  · Manage your health conditions as told by your doctor.  · Learn to manage your stress.  · Rest when you are tired.  · If it is really hot outside:  ¨ Avoid intense activities.  ¨ Use air conditioning or fans, or get in a cooler place.  ¨ Avoid caffeine and alcohol.  ¨ Wear loose-fitting, lightweight, and light-colored clothing.  · If it is really cold outside:  ¨ Avoid intense activities.  ¨ Layer your clothing.  ¨ Wear mittens or gloves, a hat, and a scarf when going outside.  ¨ Avoid alcohol.  · Learn about heart failure and get support as needed.  · Get help to maintain or improve your quality of life and your ability to care for yourself as needed.  GET HELP IF:   · You gain weight quickly.  · You are more short of breath than usual.  · You cannot do your normal activities.  · You tire easily.  · You cough more than normal, especially with activity.  · You have any or more puffiness (swelling) in areas such as your  hands, feet, ankles, or belly (abdomen).  · You cannot sleep because it is hard to breathe.  · You feel like your heart is beating fast (palpitations).  · You get dizzy or light-headed when you stand up.  GET HELP RIGHT AWAY IF:   · You have trouble breathing.  · There is a change in mental status, such as becoming less alert or not being able to focus.  · You have chest pain or discomfort.  · You faint.  MAKE SURE YOU:   · Understand these instructions.  · Will watch your condition.  · Will get help right away if you are not doing well or get worse.  This information is not intended to replace advice given to you by your health care provider. Make sure you discuss any questions you have with your health care provider.  Document Released: 09/26/2009 Document Revised: 01/08/2016 Document Reviewed: 02/03/2014  Elsevier Interactive Patient Education © 2017 [employer]

## 2019-04-16 NOTE — PROGRESS NOTES
Pt in stable condition. VSS on 5L NC. Pt states he wants/needs to leave, spoke with provider. Plan for discharge.

## 2019-04-16 NOTE — CARE PLAN
Problem: Infection  Goal: Will remain free from infection  Outcome: PROGRESSING AS EXPECTED  Pt educated on the importance of hand washing to reduce the risk of infection. Pt verbally understands and performs hand hygiene to reduce to risks of infection. Pt requires continued education with reinforcement     Problem: Bowel/Gastric:  Goal: Will not experience complications related to bowel motility  Outcome: PROGRESSING AS EXPECTED  Pt educated on the use of stool softeners to aide in bowel maintenance and the importance of regular bowel movements

## 2019-04-22 ENCOUNTER — TELEPHONE (OUTPATIENT)
Dept: CARDIOLOGY | Facility: MEDICAL CENTER | Age: 59
End: 2019-04-22

## 2019-04-22 NOTE — TELEPHONE ENCOUNTER
----- Message from Angeles Stephenson, Med Ass't sent at 4/22/2019  1:42 PM PDT -----  Regarding: prednisone meds   Contact: [phone]  Matthew Montero     Pt states he was prescribed 20mg Prednisone from the ER. He is asking if we can prescribe enough meds to last him until his appt on 5/7 with Dr. Dempsey. He was last seen by Renea   His number is [phone]

## 2019-04-23 NOTE — TELEPHONE ENCOUNTER
Spoke with pt. Explained that he was never seen by any of our providers during hospital admission for pneumonitis. He has [facility]V 5-7-19 with Dr. Dempsey. Also explained that usually Prednisone is prescribed as a 5 day course. He is on day 5. Pt. Advised that can call his PCP for possible refill.

## 2021-03-15 DIAGNOSIS — Z23 NEED FOR VACCINATION: ICD-10-CM

## 2023-02-14 NOTE — HEART FAILURE PROGRAM
Cardiovascular Nurse Navigator () Advanced Heart Failure Program Inpatient Consult Note:     PHTN with severe RHF per Dr. Odin Howard? yes  Patient will need R&L heart cath when clinically improved, possible fat biopsy.    Demographics:    · Insurance: uninsured, pending Medicaid  · Residence: [facility] Secondary Prevention interventions:    · Pneumococcal vaccine: given per admit jovana  · Influenza vaccine: refused per admit jovana  · AC for AF?: n/a      BEDSIDE NURSING Daily Weights and Intake and Output ordered    · Every HF patient should have daily weights and I's and O's  · Please weigh patient daily on a standing scale if not clinically contraindicated.   · While doing this, teach patient to write weight down on the Symptom Tracker in the HF book - and ask them what they would do with that weight at home (ie: call for 3# weight gain). This is an excellent opportunity for impactful, in the moment teaching.  Providers rely on your complete documentation of weights and I's and O's to decide whether or not our treatment is working and whether or not a HF patient is ready to discharge!    If pt does not own a working scale and cannot afford to purchase one, please provide one. Scales can be found in the Care Coordination Rooms on T-7&T-8.    [facility] Plan Notes:   SW and RNCM aware that patient is uninsured  Therapy Notes:   None  Advanced Care Planning:  No AD on file. Full code status at the time of this note's filing.    The ACC recommends engaging palliative care as part of optimization of HFrEF treatment to solicit goals of care and focus on quality of life throughout the clinical course of HF.    Once all diagnostics are in, please consider an order for palliative to discuss Advanced Care Planning.      Speaking with patients frankly about their end-of-life wishes is one of the most important things a palliative care team can do. This is especially important in the context of heart failure, since it’s  "such an unpredictable disease.    Follow up appointment:   If discharged from acute care to home (exception hospice discharge), pt must have an appointment scheduled within 7 days of discharge (Cardiology, PCP, or DC Clinic).    If discharged to Transitional Care Facility (LTAC, SNF, IRH), appointment should be made about a month out for after TCF.     Bedside Nursing Education:  Please provide HF booklet and repeated, ongoing education while administering medications, weighing patient, discussing management of symptoms, diet and need to follow up and act on changes.    Bedside Nursing Discharge:  When completing the after visit summary (discharge instructions) please select \"Cardiac Diagnosis, and Heart Failure\" in the special instructions section to populate the heart failure specific discharge instructions.     Referrals/Orders Placed:    Hospital Schedulers for HF f/u?  yes  Social Work   no  Registered Dietician  yes  [facility] CP Program for patients with Medicaid, [facility], or [facility] coverage?  no  Outpatient Care Coordination for patients with [employer] coverage and with 3 or more admissions within a year?  no    Carmen HERRON RN, [facility] ext. [phone] M-F        " Expected Date Of Service: 02/14/2023 Performing Laboratory: -[phone] Billing Type: Third-Party Bill Clinical Notes (To The Lab): Recurring order until 12/2023 Bill For Surgical Tray: no

## 2023-03-30 NOTE — PROGRESS NOTES
Dr Paez paged regarding possible antibiotic start as mentioned in rounds. Updated on pro calcitonin lab result posted. MD to place antibiotic orders as appropriate.   Detail Level: Detailed Detail Level: Generalized